# Patient Record
Sex: FEMALE | Race: BLACK OR AFRICAN AMERICAN | NOT HISPANIC OR LATINO | Employment: OTHER | URBAN - METROPOLITAN AREA
[De-identification: names, ages, dates, MRNs, and addresses within clinical notes are randomized per-mention and may not be internally consistent; named-entity substitution may affect disease eponyms.]

---

## 2018-11-05 ENCOUNTER — HOSPITAL ENCOUNTER (EMERGENCY)
Facility: OTHER | Age: 58
Discharge: HOME | End: 2018-11-06
Attending: EMERGENCY MEDICINE
Payer: MEDICARE

## 2018-11-05 DIAGNOSIS — R19.7 DIARRHEA OF PRESUMED INFECTIOUS ORIGIN: Primary | ICD-10-CM

## 2018-11-05 PROCEDURE — 99285 EMERGENCY DEPT VISIT HI MDM: CPT | Mod: 25

## 2018-11-05 PROCEDURE — 96361 HYDRATE IV INFUSION ADD-ON: CPT

## 2018-11-05 PROCEDURE — 96360 HYDRATION IV INFUSION INIT: CPT

## 2018-11-06 VITALS
TEMPERATURE: 102 F | WEIGHT: 175 LBS | HEART RATE: 86 BPM | DIASTOLIC BLOOD PRESSURE: 74 MMHG | SYSTOLIC BLOOD PRESSURE: 154 MMHG | OXYGEN SATURATION: 100 % | HEIGHT: 66 IN | RESPIRATION RATE: 18 BRPM | BODY MASS INDEX: 28.12 KG/M2

## 2018-11-06 LAB
ALBUMIN SERPL BCP-MCNC: 3.1 G/DL
ALP SERPL-CCNC: 86 U/L
ALT SERPL W/O P-5'-P-CCNC: 18 U/L
ANION GAP SERPL CALC-SCNC: 10 MMOL/L
AST SERPL-CCNC: 22 U/L
BACTERIA #/AREA URNS HPF: ABNORMAL /HPF
BASOPHILS # BLD AUTO: 0.01 K/UL
BASOPHILS NFR BLD: 0.2 %
BILIRUB SERPL-MCNC: 0.5 MG/DL
BILIRUB UR QL STRIP: ABNORMAL
BUN SERPL-MCNC: 12 MG/DL
CALCIUM SERPL-MCNC: 8.8 MG/DL
CHLORIDE SERPL-SCNC: 107 MMOL/L
CLARITY UR: ABNORMAL
CO2 SERPL-SCNC: 22 MMOL/L
COLOR UR: YELLOW
CREAT SERPL-MCNC: 0.9 MG/DL
DIFFERENTIAL METHOD: ABNORMAL
EOSINOPHIL # BLD AUTO: 0.2 K/UL
EOSINOPHIL NFR BLD: 3.8 %
ERYTHROCYTE [DISTWIDTH] IN BLOOD BY AUTOMATED COUNT: 14.1 %
EST. GFR  (AFRICAN AMERICAN): >60 ML/MIN/1.73 M^2
EST. GFR  (NON AFRICAN AMERICAN): >60 ML/MIN/1.73 M^2
GLUCOSE SERPL-MCNC: 125 MG/DL
GLUCOSE UR QL STRIP: NEGATIVE
HCT VFR BLD AUTO: 33.9 %
HGB BLD-MCNC: 10.8 G/DL
HGB UR QL STRIP: ABNORMAL
KETONES UR QL STRIP: NEGATIVE
LEUKOCYTE ESTERASE UR QL STRIP: ABNORMAL
LIPASE SERPL-CCNC: <3 U/L
LYMPHOCYTES # BLD AUTO: 0.9 K/UL
LYMPHOCYTES NFR BLD: 22.2 %
MCH RBC QN AUTO: 26.3 PG
MCHC RBC AUTO-ENTMCNC: 31.9 G/DL
MCV RBC AUTO: 83 FL
MICROSCOPIC COMMENT: ABNORMAL
MONOCYTES # BLD AUTO: 0.4 K/UL
MONOCYTES NFR BLD: 9.8 %
NEUTROPHILS # BLD AUTO: 2.7 K/UL
NEUTROPHILS NFR BLD: 63.8 %
NITRITE UR QL STRIP: NEGATIVE
PH UR STRIP: 6 [PH] (ref 5–8)
PLATELET # BLD AUTO: 251 K/UL
PMV BLD AUTO: 9.5 FL
POTASSIUM SERPL-SCNC: 3.5 MMOL/L
PROT SERPL-MCNC: 7 G/DL
PROT UR QL STRIP: ABNORMAL
RBC # BLD AUTO: 4.11 M/UL
RBC #/AREA URNS HPF: 25 /HPF (ref 0–4)
SODIUM SERPL-SCNC: 139 MMOL/L
SP GR UR STRIP: 1.01 (ref 1–1.03)
SQUAMOUS #/AREA URNS HPF: 10 /HPF
URN SPEC COLLECT METH UR: ABNORMAL
UROBILINOGEN UR STRIP-ACNC: 1 EU/DL
WBC # BLD AUTO: 4.18 K/UL
WBC #/AREA URNS HPF: 35 /HPF (ref 0–5)

## 2018-11-06 PROCEDURE — 81000 URINALYSIS NONAUTO W/SCOPE: CPT

## 2018-11-06 PROCEDURE — 25500020 PHARM REV CODE 255: Performed by: EMERGENCY MEDICINE

## 2018-11-06 PROCEDURE — 85025 COMPLETE CBC W/AUTO DIFF WBC: CPT

## 2018-11-06 PROCEDURE — 25000003 PHARM REV CODE 250: Performed by: EMERGENCY MEDICINE

## 2018-11-06 PROCEDURE — 87086 URINE CULTURE/COLONY COUNT: CPT

## 2018-11-06 PROCEDURE — 80053 COMPREHEN METABOLIC PANEL: CPT

## 2018-11-06 PROCEDURE — 83690 ASSAY OF LIPASE: CPT

## 2018-11-06 RX ORDER — AMOXICILLIN AND CLAVULANATE POTASSIUM 875; 125 MG/1; MG/1
1 TABLET, FILM COATED ORAL
Status: COMPLETED | OUTPATIENT
Start: 2018-11-06 | End: 2018-11-06

## 2018-11-06 RX ORDER — AMOXICILLIN AND CLAVULANATE POTASSIUM 875; 125 MG/1; MG/1
1 TABLET, FILM COATED ORAL 2 TIMES DAILY
Qty: 14 TABLET | Refills: 0 | Status: SHIPPED | OUTPATIENT
Start: 2018-11-06 | End: 2018-11-07 | Stop reason: ALTCHOICE

## 2018-11-06 RX ORDER — METRONIDAZOLE 500 MG/1
500 TABLET ORAL
Status: COMPLETED | OUTPATIENT
Start: 2018-11-06 | End: 2018-11-06

## 2018-11-06 RX ORDER — FLUOXETINE HYDROCHLORIDE 40 MG/1
40 CAPSULE ORAL DAILY
COMMUNITY
End: 2019-05-29 | Stop reason: SDUPTHER

## 2018-11-06 RX ORDER — ZIPRASIDONE HYDROCHLORIDE 40 MG/1
20 CAPSULE ORAL 2 TIMES DAILY
COMMUNITY
End: 2018-11-07

## 2018-11-06 RX ORDER — METRONIDAZOLE 500 MG/1
500 TABLET ORAL 3 TIMES DAILY
Qty: 21 TABLET | Refills: 0 | Status: SHIPPED | OUTPATIENT
Start: 2018-11-06 | End: 2018-11-13

## 2018-11-06 RX ORDER — ACETAMINOPHEN 500 MG
1000 TABLET ORAL
Status: COMPLETED | OUTPATIENT
Start: 2018-11-06 | End: 2018-11-06

## 2018-11-06 RX ADMIN — SODIUM CHLORIDE 1000 ML: 0.9 INJECTION, SOLUTION INTRAVENOUS at 12:11

## 2018-11-06 RX ADMIN — IOHEXOL 75 ML: 350 INJECTION, SOLUTION INTRAVENOUS at 01:11

## 2018-11-06 RX ADMIN — AMOXICILLIN AND CLAVULANATE POTASSIUM 1 TABLET: 875; 125 TABLET, FILM COATED ORAL at 02:11

## 2018-11-06 RX ADMIN — ACETAMINOPHEN 1000 MG: 500 TABLET ORAL at 12:11

## 2018-11-06 RX ADMIN — METRONIDAZOLE 500 MG: 500 TABLET ORAL at 02:11

## 2018-11-06 NOTE — ED TRIAGE NOTES
"Pt family at bedside reports pt w/ diarrhea "off and on" for a week. Family also reports pt w/ frequent urination. Pt does not have any complaints, history of MR, family reports behavior is baseline.   "

## 2018-11-06 NOTE — ED PROVIDER NOTES
"Encounter Date: 11/5/2018    SCRIBE #1 NOTE: I, Jayy Álvarez, am scribing for, and in the presence of, Dr. Umana.       History     Chief Complaint   Patient presents with    Diarrhea     Diarrhea, and abd pain Xs 2 days.      Seen by provider: 12:03 AM    Patient is a 57 y.o. female with a history of MR who presents to the ED with complaint of diarrhea, which began three days ago. Per family, patient has had intermittent diarrhea for three days as well as bowel incontinence. Family reports returning home to find the patient had soiled herself and was sitting in her feces. They also report the patient had soiled herself at the adult day care she attends. Per family, the patient has not indicated any pain, though she has been able to do so in the past. Patient denies any abdominal pain currently. Family report associated fever. They deny appetite change, vomiting, or blood in stool. They denies recent antibiotics or medication change. They note that the patient has recently been very depressed after the sudden loss of her daughter. Patient lives at home with her two siblings and attends adult day care.       The history is provided by the patient and a relative (brother and sister).     Review of patient's allergies indicates:  No Known Allergies  Past Medical History:   Diagnosis Date    Mental retardation     Mood disorder     depression     Past Surgical History:   Procedure Laterality Date    CARDIAC SURGERY      "hole in the heart"     Family History   Problem Relation Age of Onset    Diabetes Mother     Diabetes Father      Social History     Tobacco Use    Smoking status: Never Smoker   Substance Use Topics    Alcohol use: No    Drug use: Not on file     Review of Systems   Constitutional: Positive for fever. Negative for appetite change.   HENT: Negative for congestion.    Respiratory: Negative for shortness of breath.    Cardiovascular: Negative for chest pain.   Gastrointestinal: Positive for " diarrhea. Negative for abdominal pain and vomiting.   Genitourinary: Negative for difficulty urinating.   Musculoskeletal: Negative for back pain.   Skin: Negative for rash.   Neurological: Negative for dizziness, weakness and headaches.   Psychiatric/Behavioral: Negative for confusion.       Physical Exam     Initial Vitals [11/05/18 2326]   BP Pulse Resp Temp SpO2   (!) 179/81 102 18 (!) 102.3 °F (39.1 °C) 97 %      MAP       --         Physical Exam    Nursing note and vitals reviewed.  Constitutional: She appears well-developed and well-nourished. She is not diaphoretic. No distress.   HENT:   Head: Normocephalic and atraumatic.   Eyes: Conjunctivae and EOM are normal.   Neck: Normal range of motion. Neck supple.   Cardiovascular: Normal rate, regular rhythm and normal heart sounds. Exam reveals no gallop and no friction rub.    No murmur heard.  Pulmonary/Chest: Breath sounds normal. No respiratory distress. She has no wheezes. She has no rhonchi. She has no rales.   Abdominal: Soft. She exhibits no distension. There is no tenderness. There is no rebound and no guarding.   Musculoskeletal: Normal range of motion.   Neurological: She is alert.   Chronic facial asymmetry.    Skin: Skin is warm and dry.         ED Course   Procedures  Labs Reviewed   CBC W/ AUTO DIFFERENTIAL - Abnormal; Notable for the following components:       Result Value    Hemoglobin 10.8 (*)     Hematocrit 33.9 (*)     MCH 26.3 (*)     MCHC 31.9 (*)     Lymph # 0.9 (*)     All other components within normal limits   COMPREHENSIVE METABOLIC PANEL - Abnormal; Notable for the following components:    CO2 22 (*)     Glucose 125 (*)     Albumin 3.1 (*)     All other components within normal limits   LIPASE - Abnormal; Notable for the following components:    Lipase <3 (*)     All other components within normal limits   URINALYSIS, REFLEX TO URINE CULTURE - Abnormal; Notable for the following components:    Appearance, UA Cloudy (*)     Protein,  UA Trace (*)     Bilirubin (UA) 1+ (*)     Occult Blood UA 2+ (*)     Leukocytes, UA 3+ (*)     All other components within normal limits    Narrative:     Preferred Collection Type->Urine, Clean Catch   URINALYSIS MICROSCOPIC - Abnormal; Notable for the following components:    RBC, UA 25 (*)     WBC, UA 35 (*)     Bacteria, UA Many (*)     All other components within normal limits    Narrative:     Preferred Collection Type->Urine, Clean Catch   CULTURE, URINE          Imaging Results          CT Abdomen Pelvis With Contrast (Final result)  Result time 11/06/18 01:54:04    Final result by Mauricio Tejada MD (11/06/18 01:54:04)                 Impression:      No CT findings of acute diverticulitis.    Marked wall thickening fluid distention involving the small and large bowel loops.  The findings are consistent with enterocolitis (infectious or inflammatory).  Gastroenterology evaluation is suggested.      Electronically signed by: Mauricio Tejada MD  Date:    11/06/2018  Time:    01:54             Narrative:    EXAMINATION:  CT ABDOMEN PELVIS WITH CONTRAST    CLINICAL HISTORY:  LLQ pain, suspect diverticulitis;    TECHNIQUE:  Low dose axial images, sagittal and coronal reformations were obtained from the lung bases to the pubic symphysis following the IV administration of 75 mL of Omnipaque 350 .  Oral contrast was not given. Delayed images were obtained.  The examination is degraded secondary to motion    COMPARISON:  None.    FINDINGS:  There are no pleural effusions.  There is no evidence of a pneumothorax.  No airspace opacity is identified.  No pulmonary nodule is present.    The heart is unremarkable.  There is normal tapering of the abdominal aorta.  There are scattered calcifications along the course of the abdominal aorta and its branch vessels.  The portal veins and mesenteric vessels are within normal limits.  There is no evidence of lymphadenopathy in the abdomen or pelvis.    There is a small hiatal  hernia.  The distal portions of the stomach are unremarkable.  The small diverticulum involving the transverse portion of the duodenum.  The remaining duodenum is within normal limits.  There is fluid distention and wall thickening involving the small bowel loops.  The appendix is unremarkable.  There is also fluid distention and wall thickening involving the large bowel.  No transition point is identified.  There is no evidence of significant diverticular disease involving the large bowel.    The liver is unremarkable.  The gallbladder is within normal limits.  The biliary tree is unremarkable.  The spleen is within normal limits.  The pancreas is unremarkable.    The adrenal glands are unremarkable.  There are bilateral extrarenal pelvises.  The ureters are otherwise unremarkable.  The urinary bladder is within normal limits.  The patient appears status post hysterectomy.  The adnexal structures are unremarkable.    There is no evidence of free fluid the abdomen or pelvis.  Evaluation for free air is significantly limited given motion degradation.  There is no evidence of pneumatosis.  No portal venous air is identified.    The psoas margins are unremarkable.  The abdominal wall is within normal limits.  There are mild degenerative changes involving the osseous structures.                                 Medical Decision Making:   Clinical Tests:   Lab Tests: Ordered and Reviewed  Radiological Study: Ordered and Reviewed  ED Management:  Chronically ill-appearing patient, developmentally delayed, brought in by family who are the caregivers for persistent watery diarrhea of which the patient is incontinent.  No recent antibiotics.  No recent out of country travel.  Patient is not complaining of any pain. She is nontender examination. Due to the communication barrier, I do opt for a larger workup that might otherwise be indicated by and nontender diarrhea, blood work without any concerning findings.  CT scan  demonstrates likely enteritis.  Given her fever, chronic illness, and participation an adult  with other chronically ill patients, I have started her on antibiotics.  I have requested they follow up with primary care within 3 days for recheck, return here if worse.    I did have an extensive talk regarding signs to return for and need for follow up. Patient expressed understanding and will monitor symptoms closely and follow-up as needed.    KARINA Umana M.D.  11/06/2018  3:25 AM              Scribe Attestation:   Scribe #1: I performed the above scribed service and the documentation accurately describes the services I performed. I attest to the accuracy of the note.    Attending Attestation:           Physician Attestation for Scribe:  Physician Attestation Statement for Scribe #1: I, Dr. Umana, reviewed documentation, as scribed by Jayy Álvarez in my presence, and it is both accurate and complete.                    Clinical Impression:     1. Diarrhea of presumed infectious origin                                  Bryson Umana MD  11/06/18 0325

## 2018-11-07 ENCOUNTER — OFFICE VISIT (OUTPATIENT)
Dept: INTERNAL MEDICINE | Facility: CLINIC | Age: 58
End: 2018-11-07
Payer: MEDICARE

## 2018-11-07 VITALS
WEIGHT: 165.56 LBS | HEIGHT: 66 IN | BODY MASS INDEX: 26.61 KG/M2 | OXYGEN SATURATION: 98 % | HEART RATE: 78 BPM | DIASTOLIC BLOOD PRESSURE: 70 MMHG | SYSTOLIC BLOOD PRESSURE: 130 MMHG

## 2018-11-07 DIAGNOSIS — Z12.11 COLON CANCER SCREENING: ICD-10-CM

## 2018-11-07 DIAGNOSIS — Z12.4 CERVICAL CANCER SCREENING: ICD-10-CM

## 2018-11-07 DIAGNOSIS — K52.9 ENTEROCOLITIS: Primary | ICD-10-CM

## 2018-11-07 DIAGNOSIS — F32.A DEPRESSION, UNSPECIFIED DEPRESSION TYPE: ICD-10-CM

## 2018-11-07 DIAGNOSIS — Z00.00 HEALTHCARE MAINTENANCE: ICD-10-CM

## 2018-11-07 DIAGNOSIS — Z12.39 BREAST CANCER SCREENING: ICD-10-CM

## 2018-11-07 DIAGNOSIS — D64.9 NORMOCYTIC ANEMIA: ICD-10-CM

## 2018-11-07 LAB
BACTERIA UR CULT: NORMAL
BACTERIA UR CULT: NORMAL

## 2018-11-07 PROCEDURE — 99999 PR PBB SHADOW E&M-EST. PATIENT-LVL IV: CPT | Mod: PBBFAC,,, | Performed by: INTERNAL MEDICINE

## 2018-11-07 PROCEDURE — 99205 OFFICE O/P NEW HI 60 MIN: CPT | Mod: S$PBB,,, | Performed by: INTERNAL MEDICINE

## 2018-11-07 PROCEDURE — 99214 OFFICE O/P EST MOD 30 MIN: CPT | Mod: PBBFAC | Performed by: INTERNAL MEDICINE

## 2018-11-07 RX ORDER — AMOXICILLIN 500 MG/1
CAPSULE ORAL
Refills: 0 | COMMUNITY
Start: 2018-08-21 | End: 2018-11-07 | Stop reason: ALTCHOICE

## 2018-11-07 RX ORDER — CIPROFLOXACIN 500 MG/1
500 TABLET ORAL EVERY 12 HOURS
Qty: 14 TABLET | Refills: 0 | Status: SHIPPED | OUTPATIENT
Start: 2018-11-07 | End: 2018-11-14

## 2018-11-07 RX ORDER — IBUPROFEN 800 MG/1
TABLET ORAL
Refills: 0 | COMMUNITY
Start: 2018-08-21 | End: 2018-11-07

## 2018-11-07 RX ORDER — HYDROCODONE BITARTRATE AND ACETAMINOPHEN 5; 325 MG/1; MG/1
TABLET ORAL
Refills: 0 | COMMUNITY
Start: 2018-08-21 | End: 2018-11-07

## 2018-11-07 NOTE — PROGRESS NOTES
Subjective:       Patient ID: Binta Yang is a 57 y.o. female who  has a past medical history of Depression, Mental retardation, and Normocytic anemia.    Chief Complaint: Diarrhea    HPI    History was obtained from the patient and supplemented through chart review and from her sister in law who accompanied the patient.    The patient has not seen a PCP in our system.    At baseline, she has a developmental delay/mild MR.  The patient's brother is her guardian.  She attends an adult  and lives with her brother and sister in law.  Her family prepares her meals.  She can bathe herself if her family members prepare the water.  She has chronic facial asymmetry and her sister-in-law states her mouth is usually twisted towards the right.  She is unsure if the patient is mimicking other patients at the adult .  The patient is usually quiet and answers in one-word responses.      Was previously seen by Neurology on 12/11/2014 for evaluation for seizure after she passed out in [place].  EEG was normal.    She presents today due to diarrhea for about 4 days.  She has loose and watery stool.  Her sister-in-law thinks that she might have 4-5 bowel movements a day, which has not improved.  She did go to a [employer] party 3 days ago and ate chicken, fish, baked mac and cheese.  No one else was ill.  There were 2 episodes of hematochezia yesterday and today.  She denies lightheadedness, dizziness, shortness of breath, chest pain.  The patient denies abdominal pain or distension.  She has not gone to the adult  this week due to the diarrhea; she was having accidents at the adult  (unclear if this was fecal incontinence or urgency).  Her sister-in-law also notes that the patient has been sleeping all day and she has to wake her up to eat, which is unusual.  Denied fever at home although did have a temperature of 102° in the ED.  Denied nausea and vomiting.  She has had a good appetite and is eating  "well, although is not staying hydrated.  Denies sick contacts or recent travel.  She denies dysuria or hematuria.  She has not been on antibiotics recently.  Denies generalized weakness.    She went to the ED on 11/05/2018 for diarrhea.  Abdomen was nontender on exam.  She had a fever up to 102.3 in the ED.  Labs did not reveal leukocytosis, transaminitis, elevated T bili or alk-phos.  Lipase negative.  UA with cloudy appearance, negative for nitrites.  3+ leuk esterase, 25 red blood cells, 35 white blood cells, and many bacteria.  There were 10 squamous cells.  Urine culture with multiple organisms, final.  CT abdomen pelvis without diverticulitis, but with wall thickening consistent with enterocolitis.  Was discharged with Augmentin b.i.d. for 7 days and Flagyl t.i.d. for 7 days.  She has been compliant with her medications but without improvement.    Elevated BP:  BP was 179/81, heart rate 102 in the ED.  Otherwise BP has been in the 110s to 130s over 70s.  BP today 130/70.    Depression:  Her psychiatrist is Dr. Fam Urias.  Her daughter recently passed away 2 years ago who had Down syndrome and MR.  She is taking wellbutrin 100 and Prozac 40.      Review of Systems   Unable to perform ROS: Patient nonverbal   Constitutional: Positive for activity change. Negative for appetite change and fever.   Respiratory: Negative for shortness of breath.    Cardiovascular: Negative for chest pain.   Gastrointestinal: Positive for blood in stool and diarrhea. Negative for abdominal distention, abdominal pain, nausea and vomiting.   Genitourinary: Negative for dysuria and hematuria.   Neurological: Negative for dizziness and light-headedness.       Past Medical History:   Diagnosis Date    Depression     Mental retardation     Normocytic anemia      Past Surgical History:   Procedure Laterality Date    CARDIAC SURGERY      "hole in the heart", during childhood    HYSTERECTOMY      difficulties with personal hygiene " "    Family History   Problem Relation Age of Onset    Diabetes Mother     Asthma Mother     Hypertension Mother     Diabetes Father     Hypertension Father     Prostate cancer Sister     Prostate cancer Brother     Diverticulitis Brother     Heart disease Daughter 35    Mental retardation Daughter     Congenital heart disease Daughter     Down syndrome Daughter     Mental retardation Son         mild, but functional     Social History     Socioeconomic History    Marital status: Single     Spouse name: Not on file    Number of children: Not on file    Years of education: Not on file    Highest education level: Not on file   Social Needs    Financial resource strain: Not on file    Food insecurity - worry: Not on file    Food insecurity - inability: Not on file    Transportation needs - medical: Not on file    Transportation needs - non-medical: Not on file   Occupational History    Not on file   Tobacco Use    Smoking status: Never Smoker    Smokeless tobacco: Never Used   Substance and Sexual Activity    Alcohol use: No    Drug use: Not on file    Sexual activity: Not on file   Other Topics Concern    Not on file   Social History Narrative    Not on file     Objective:      Vitals:    11/07/18 1417   BP: 130/70   BP Location: Left arm   Patient Position: Sitting   BP Method: Medium (Manual)   Pulse: 78   SpO2: 98%   Weight: 75.1 kg (165 lb 9.1 oz)   Height: 5' 6" (1.676 m)      Physical Exam   Constitutional: She appears well-developed and well-nourished. No distress.   HENT:   Head: Normocephalic and atraumatic.   Nose: Nose normal.   Mouth/Throat: Oropharynx is clear and moist. No oropharyngeal exudate.   Eyes: Conjunctivae and EOM are normal. Pupils are equal, round, and reactive to light. Right eye exhibits no discharge. Left eye exhibits no discharge. No scleral icterus.   Neck: Neck supple. No tracheal deviation present. No thyromegaly present.   Cardiovascular: Normal rate, " regular rhythm, normal heart sounds and intact distal pulses.   No murmur heard.  Pulmonary/Chest: Effort normal and breath sounds normal. No respiratory distress. She has no wheezes.   Abdominal: Soft. Bowel sounds are normal. She exhibits no distension. There is no tenderness. There is no guarding.   Musculoskeletal: She exhibits no edema or deformity.   Lymphadenopathy:     She has no cervical adenopathy.   Neurological: She is alert. No cranial nerve deficit. Gait normal.   Paucity of speech.  Answers in one-word responses.  Mouth is twisted towards the right.  Follows commands.   Skin: Skin is warm and dry. Capillary refill takes less than 2 seconds. She is not diaphoretic. No erythema.   Psychiatric: She has a normal mood and affect. Her behavior is normal.         Lab Results   Component Value Date    WBC 4.18 11/06/2018    HGB 10.8 (L) 11/06/2018    HCT 33.9 (L) 11/06/2018     11/06/2018    ALT 18 11/06/2018    AST 22 11/06/2018     11/06/2018    K 3.5 11/06/2018     11/06/2018    CREATININE 0.9 11/06/2018    BUN 12 11/06/2018    CO2 22 (L) 11/06/2018       The ASCVD Risk score ([place] DC Jr., et al., 2013) failed to calculate for the following reasons:    Cannot find a previous HDL lab    Cannot find a previous total cholesterol lab    (Imaging have been independently reviewed)  CT abdomen pelvis without diverticulitis, but with wall thickening consistent with enterocolitis.     Assessment:       1. Depression, unspecified depression type    2. Normocytic anemia    3. Enterocolitis    4. Cervical cancer screening    5. Colon cancer screening    6. Breast cancer screening    7. Healthcare maintenance          Plan:       Binta was seen today for diarrhea.    Diagnoses and all orders for this visit:    Depression, unspecified depression type  Comments:  psychiatrist is Dr. Fam Urias. Cont.  Wellbutrin 100 and Prozac 40    Normocytic anemia  Comments:  Will order iron studies during  well visit    Enterocolitis  Comments:  Discontinue Augmentin. Continue Flagyl. Add Cipro for better anaerobic coverage. Stool studies since she is at an adult  with fever  Orders:  -     WBC, Stool; Future  -     Stool culture; Future  -     Clostridium difficile EIA; Future  -     Occult blood x 1, stool; Future  -     ciprofloxacin HCl (CIPRO) 500 MG tablet; Take 1 tablet (500 mg total) by mouth every 12 (twelve) hours. for 7 days    Cervical cancer screening  Comments:  Will order referral to OBGYN during future well visit for annual pelvic exam.  History of hysterectomy for hygienic reasons and self-care    Colon cancer screening  Comments:  Will order referral to GI during future well visit after enterocolitis has resolved    Breast cancer screening  Comments:  Will order mammogram doing well visit    Healthcare maintenance  Comments:  Will assess vaccination status during future well visit    Other orders  -     Cancel: Ferritin; Future  -     Cancel: Iron and TIBC; Future  -     Cancel: Vitamin B12; Future  -     Cancel: Hemoglobin A1c; Future  -     Cancel: Lipid panel; Future  -     Cancel: Ambulatory referral to Gastroenterology  -     Cancel: Mammo Digital Screening Bilateral With CAD; Future  -     Cancel: Ambulatory Referral to Obstetrics / Gynecology  -     Cancel: Ambulatory Referral to Psychiatry  -     Cancel: TSH; Future         Notification of Lab Results: Phone Call .  Astrid Yang, brother.  [phone]    Side effects of medication(s) were discussed in detail and patient voiced understanding.  Patient will call back for any issues or complications.     RTC in 1 month(s) or sooner PRN for f/u gastroenteritis and well visit.

## 2018-11-12 ENCOUNTER — LAB VISIT (OUTPATIENT)
Dept: LAB | Facility: OTHER | Age: 58
End: 2018-11-12
Payer: MEDICARE

## 2018-11-12 DIAGNOSIS — K52.9 ENTEROCOLITIS: ICD-10-CM

## 2018-11-12 LAB
C DIFF GDH STL QL: NEGATIVE
C DIFF TOX A+B STL QL IA: NEGATIVE
OB PNL STL: POSITIVE
WBC #/AREA STL HPF: NORMAL /[HPF]

## 2018-11-12 PROCEDURE — 89055 LEUKOCYTE ASSESSMENT FECAL: CPT

## 2018-11-12 PROCEDURE — 87045 FECES CULTURE AEROBIC BACT: CPT

## 2018-11-12 PROCEDURE — 82272 OCCULT BLD FECES 1-3 TESTS: CPT

## 2018-11-12 PROCEDURE — 87046 STOOL CULTR AEROBIC BACT EA: CPT

## 2018-11-12 PROCEDURE — 87427 SHIGA-LIKE TOXIN AG IA: CPT

## 2018-11-12 PROCEDURE — 87324 CLOSTRIDIUM AG IA: CPT

## 2018-11-14 ENCOUNTER — TELEPHONE (OUTPATIENT)
Dept: INTERNAL MEDICINE | Facility: CLINIC | Age: 58
End: 2018-11-14

## 2018-11-15 LAB
BACTERIA STL CULT: NORMAL
BACTERIA STL CULT: NORMAL

## 2018-11-16 ENCOUNTER — TELEPHONE (OUTPATIENT)
Dept: INTERNAL MEDICINE | Facility: CLINIC | Age: 58
End: 2018-11-16

## 2018-11-16 NOTE — LETTER
November 16, 2018    Binta BRUCE [id number]  [facility] [address]             [facility]  [address]  [facility] [address]  Phone: [phone]  Fax: [phone] Dear MsMoy Trey:    This letter is for the patient's family to let them know that her stool studies were negative for infection, but she should complete the antibiotics as prescribed.  We will re-evaluate her symptoms during her follow-up visit and then refer to GI.       If you have any questions or concerns, please don't hesitate to call.    Sincerely,        Genesis Marquez MA

## 2018-12-06 NOTE — PROGRESS NOTES
Subjective:       Patient ID: Binta Yang is a 57 y.o. female who  has a past medical history of Depression, Mental retardation, and Normocytic anemia.    Chief Complaint: Follow-up (Enterocolitis); Establish Care; and Annual Exam    HPI    History was obtained from the patient and supplemented through chart review and from sister in law who accompanied the patient.  No ER or other clinic visits since our last appointment.    She saw me on 11/07/2018 for diarrhea and presents for follow-up for enterocolitis.    At baseline, she has a developmental delay/mild MR.  The patient's brother is her guardian.  She attends an adult  and lives with her brother and sister in law.  Her family prepares her meals.  She can bathe herself if her family members prepare the water.  She requires frequent reminders to shower and brush her teeth.  She can walk and transfer herself without difficulty. She has chronic facial asymmetry and her sister-in-law states her mouth is usually twisted towards the right.  She is unsure if the patient is mimicking other patients at the adult .  The patient is usually quiet.  She can nod her head and answer in one-word responses; has appropriate responses.        Diarrhea/enterocolitis:  Established care with me last month due to loose and watery stool with 4-5 bowel movements a day, which has not improved.  There were 2 episodes of hematochezia.  She denied lightheadedness, dizziness, shortness of breath, chest pain.  The patient denies abdominal pain or distension.  Goes to adult  with fecal or urine incontinence versus urgency.  Denied fever, nausea and vomiting.  She has had a good appetite and is eating well.  She denies dysuria or hematuria.  Denies generalized weakness.     She went to the ED on 11/05/2018 for diarrhea.  Abdomen was nontender on exam.  She had a fever up to 102.3 in the ED.  Labs did not reveal leukocytosis, transaminitis, elevated T bili or alk-phos.   Lipase negative.  UA with cloudy appearance, negative for nitrites.  3+ leuk esterase, 25 red blood cells, 35 white blood cells, and many bacteria.  There were 10 squamous cells.  Urine culture with multiple organisms, final.  CT abdomen pelvis without diverticulitis, but with wall thickening consistent with enterocolitis.  Was discharged with Augmentin b.i.d. for 7 days and Flagyl t.i.d. for 7 days.  She was compliant with her medications but without improvement.    On my exam, she appeared in no apparent distress and was nontoxic appearing.  Abdomen was nontender to palpation.  During our last visit, I stopped Augmentin. Flagyl was continued and I added Cipro x 7 days for better enteric anaerobic coverage. C diff and Stool studies were negative    Her diarrhea has completely resolved since then.  Denies abdominal pain, nausea or vomiting.  Reports good appetite.  Denies fever.     Elevated BP:  BP was 179/81, heart rate 102 in the ED last month.  Otherwise BP has been in the 110s to 130s over 70s.  BP today 180/90, repeat was 160/90.  Her sister-in-law has checked her BP at home and it is 120-130 systolic.  She has not checked her blood pressure in about a month.  Denies CP, SOB, LIZAMA, lightheadedness, dizziness.    Functional fecal and urinary incontinence:  Needs to change her clothes about 3 to 4 times a week.  Requires a lot of encouragement to do ADLs.  Her sister-in-law thinks that she waits until the last minute and therefore can't get to the restroom in time.  The patient denies dysuria, hematuria, cloudy appearing urine.  Denies stress incontinence.     Depression:  Her psychiatrist is Dr. Fam Urias.  Her daughter recently passed away 2 years ago who had Down syndrome and MR.  She is taking wellbutrin 100 and Prozac 40.  Reports good mood.  The patient requires a lot of encouragement to do ADLs.    Review of Systems   Constitutional: Negative for fever and unexpected weight change.   HENT: Negative  "for rhinorrhea and sneezing.    Eyes: Negative for redness and itching.   Respiratory: Negative for shortness of breath and wheezing.    Cardiovascular: Negative for chest pain and palpitations.   Gastrointestinal: Negative for abdominal pain and diarrhea.   Genitourinary: Negative for dysuria and hematuria.   Musculoskeletal: Negative for gait problem and joint swelling.   Skin: Negative for color change and rash.   Neurological: Negative for weakness and headaches.   Hematological: Negative for adenopathy.   Psychiatric/Behavioral: Negative for dysphoric mood and self-injury.         I personally reviewed Past Medical History, Past Surgical History, Social History, and Family History.    Objective:      Vitals:    12/07/18 1103 12/07/18 1136   BP: (!) 180/90 (!) 160/90   Pulse: 86    SpO2: 98%    Weight: 76.4 kg (168 lb 6.9 oz)    Height: 5' 6" (1.676 m)       Physical Exam   Constitutional: She appears well-developed and well-nourished. No distress.   HENT:   Head: Normocephalic and atraumatic.   Nose: Nose normal.   Mouth/Throat: Oropharynx is clear and moist. No oropharyngeal exudate.   Eyes: Conjunctivae and EOM are normal. Pupils are equal, round, and reactive to light. Right eye exhibits no discharge. Left eye exhibits no discharge. No scleral icterus.   Neck: Neck supple. No tracheal deviation present. No thyromegaly present.   Cardiovascular: Normal rate, regular rhythm, normal heart sounds and intact distal pulses.   No murmur heard.  Pulmonary/Chest: Effort normal and breath sounds normal. No respiratory distress. She has no wheezes.   Abdominal: Soft. Bowel sounds are normal. She exhibits no distension. There is no tenderness.   Musculoskeletal: She exhibits edema. She exhibits no deformity.   Trace edema to bilateral legs   Lymphadenopathy:     She has no cervical adenopathy.   Neurological: She is alert. No cranial nerve deficit.   Not oriented to day of week, year  Oriented to location, season, " holiday  Mouth twisted to the R, chronic  Follows commands   Skin: Skin is warm and dry. Capillary refill takes less than 2 seconds. She is not diaphoretic. No erythema.   Psychiatric: She has a normal mood and affect. Her behavior is normal.   Paucity of speech.  A few word answers. Nods appropriately         Lab Results   Component Value Date    WBC 4.18 11/06/2018    HGB 10.8 (L) 11/06/2018    HCT 33.9 (L) 11/06/2018     11/06/2018    ALT 18 11/06/2018    AST 22 11/06/2018     11/06/2018    K 3.5 11/06/2018     11/06/2018    CREATININE 0.9 11/06/2018    BUN 12 11/06/2018    CO2 22 (L) 11/06/2018       The ASCVD Risk score (Masonariel HASKINS Jr., et al., 2013) failed to calculate for the following reasons:    Cannot find a previous HDL lab    Cannot find a previous total cholesterol lab    (Imaging have been independently reviewed)  CT abdomen with enterocolitis    Assessment:       1. Annual physical exam    2. Need for hepatitis C screening test    3. Screening for colorectal cancer    4. Visit for screening mammogram    5. Mental retardation    6. Mild episode of recurrent major depressive disorder    7. Enterocolitis    8. Normocytic anemia    9. Well woman exam with routine gynecological exam    10. Encounter for lipid screening for cardiovascular disease    11. Encounter for screening for diabetes mellitus    12. Functional incontinence    13. Anemia associated with nutritional deficiency     14. Abnormal finding of blood chemistry     15. Elevated blood pressure reading          Plan:       Binta was seen today for follow-up, establish care and annual exam.    Diagnoses and all orders for this visit:    Annual physical exam  Comments:  Form filled out for adult     Need for hepatitis C screening test  -     Hepatitis C antibody; Future    Screening for colorectal cancer  Comments:  Family prefers colonoscopy rather than fit kit since it is hard to monitor her bowel habits  Orders:  -      Ambulatory referral to Gastroenterology; Future    Visit for screening mammogram  -     Mammo Digital Screening Bilat w/ Arnold; Future    Mental retardation  Comments:  Needs encouragement and assistance to perform ADLs. Goes to adult . Form completed and given to the patient    Mild episode of recurrent major depressive disorder  Comments:  Reports good mood.  Continue Wellbutrin 100 and Prozac 40.  Psychiatrist is Dr. Fam Urias    Enterocolitis  Comments:  Symptoms completely resolved after Flagyl and Cipro. Stool studies negative    Normocytic anemia  -     Ferritin; Future  -     Iron and TIBC; Future  -     Vitamin B12; Future  -     Folate; Future    Well woman exam with routine gynecological exam  Comments:  Annual pelvic exam and might not be able to report symptoms as easily. h/o hysterectomy for hygienic reasons and self-care  Orders:  -     Ambulatory Referral to Obstetrics / Gynecology; Future    Encounter for lipid screening for cardiovascular disease  -     Lipid panel; Future    Encounter for screening for diabetes mellitus  -     Hemoglobin A1c; Future    Functional incontinence  Comments:  Fecal and urine.  Previous UA with asymptomatic bacteriuria; no need for antibiotics.  No urinary symptoms.  Discussed timed voiding    Anemia associated with nutritional deficiency   -     Vitamin B12; Future  -     Folate; Future    Abnormal finding of blood chemistry   -     Hemoglobin A1c; Future    Elevated blood pressure reading  Comments:  BP 110s to 130s, isolated to 180.  Asymptomatic. Family will bring BP log. Nurse visit for BP check and 1-2 weeks.  Return to clinic in 1 month.    Other orders  -     Cancel: Fecal Immunochemical Test (iFOBT); Future         Notification of Lab Results: Phone Call  Astrid Yang, brother.  [phone]    Side effects of medication(s) were discussed in detail and patient voiced understanding.  Patient will call back for any issues or complications.     RTC in  1 month(s) or sooner PRN for elevated blood pressure with BP log.  Nurse visit in 1-2 weeks for BP check.

## 2018-12-07 ENCOUNTER — OFFICE VISIT (OUTPATIENT)
Dept: INTERNAL MEDICINE | Facility: CLINIC | Age: 58
End: 2018-12-07
Payer: MEDICARE

## 2018-12-07 VITALS
WEIGHT: 168.44 LBS | DIASTOLIC BLOOD PRESSURE: 90 MMHG | SYSTOLIC BLOOD PRESSURE: 160 MMHG | OXYGEN SATURATION: 98 % | BODY MASS INDEX: 27.07 KG/M2 | HEIGHT: 66 IN | HEART RATE: 86 BPM

## 2018-12-07 DIAGNOSIS — R79.9 ABNORMAL FINDING OF BLOOD CHEMISTRY: ICD-10-CM

## 2018-12-07 DIAGNOSIS — D53.9 ANEMIA ASSOCIATED WITH NUTRITIONAL DEFICIENCY: ICD-10-CM

## 2018-12-07 DIAGNOSIS — Z13.220 ENCOUNTER FOR LIPID SCREENING FOR CARDIOVASCULAR DISEASE: ICD-10-CM

## 2018-12-07 DIAGNOSIS — Z01.419 WELL WOMAN EXAM WITH ROUTINE GYNECOLOGICAL EXAM: ICD-10-CM

## 2018-12-07 DIAGNOSIS — K52.9 ENTEROCOLITIS: ICD-10-CM

## 2018-12-07 DIAGNOSIS — Z13.6 ENCOUNTER FOR LIPID SCREENING FOR CARDIOVASCULAR DISEASE: ICD-10-CM

## 2018-12-07 DIAGNOSIS — D64.9 NORMOCYTIC ANEMIA: ICD-10-CM

## 2018-12-07 DIAGNOSIS — F33.0 MILD EPISODE OF RECURRENT MAJOR DEPRESSIVE DISORDER: ICD-10-CM

## 2018-12-07 DIAGNOSIS — Z12.12 SCREENING FOR COLORECTAL CANCER: ICD-10-CM

## 2018-12-07 DIAGNOSIS — Z12.31 VISIT FOR SCREENING MAMMOGRAM: ICD-10-CM

## 2018-12-07 DIAGNOSIS — Z11.59 NEED FOR HEPATITIS C SCREENING TEST: ICD-10-CM

## 2018-12-07 DIAGNOSIS — F79 MENTAL RETARDATION: ICD-10-CM

## 2018-12-07 DIAGNOSIS — R39.81 FUNCTIONAL INCONTINENCE: ICD-10-CM

## 2018-12-07 DIAGNOSIS — Z13.1 ENCOUNTER FOR SCREENING FOR DIABETES MELLITUS: ICD-10-CM

## 2018-12-07 DIAGNOSIS — Z12.11 SCREENING FOR COLORECTAL CANCER: ICD-10-CM

## 2018-12-07 DIAGNOSIS — Z00.00 ANNUAL PHYSICAL EXAM: Primary | ICD-10-CM

## 2018-12-07 DIAGNOSIS — R03.0 ELEVATED BLOOD PRESSURE READING: ICD-10-CM

## 2018-12-07 PROCEDURE — 99396 PREV VISIT EST AGE 40-64: CPT | Mod: S$PBB,,, | Performed by: INTERNAL MEDICINE

## 2018-12-07 PROCEDURE — 99215 OFFICE O/P EST HI 40 MIN: CPT | Mod: PBBFAC | Performed by: INTERNAL MEDICINE

## 2018-12-07 PROCEDURE — 99999 PR PBB SHADOW E&M-EST. PATIENT-LVL V: CPT | Mod: PBBFAC,,, | Performed by: INTERNAL MEDICINE

## 2018-12-10 ENCOUNTER — HOSPITAL ENCOUNTER (OUTPATIENT)
Dept: RADIOLOGY | Facility: OTHER | Age: 58
Discharge: HOME | End: 2018-12-10
Attending: INTERNAL MEDICINE
Payer: MEDICARE

## 2018-12-10 DIAGNOSIS — Z12.31 VISIT FOR SCREENING MAMMOGRAM: ICD-10-CM

## 2018-12-10 PROCEDURE — 77063 BREAST TOMOSYNTHESIS BI: CPT | Mod: 26,,, | Performed by: RADIOLOGY

## 2018-12-10 PROCEDURE — 77067 SCR MAMMO BI INCL CAD: CPT | Mod: 26,,, | Performed by: RADIOLOGY

## 2018-12-10 PROCEDURE — 77063 BREAST TOMOSYNTHESIS BI: CPT | Mod: TC

## 2018-12-11 ENCOUNTER — TELEPHONE (OUTPATIENT)
Dept: INTERNAL MEDICINE | Facility: CLINIC | Age: 58
End: 2018-12-11

## 2018-12-11 NOTE — TELEPHONE ENCOUNTER
Called and spoke with Patient sister in law whom brings her to the doctor and told her mammogram was normal we will repeat the study in one year

## 2018-12-13 ENCOUNTER — OFFICE VISIT (OUTPATIENT)
Dept: OBSTETRICS AND GYNECOLOGY | Facility: CLINIC | Age: 58
End: 2018-12-13
Payer: MEDICARE

## 2018-12-13 VITALS
BODY MASS INDEX: 27.39 KG/M2 | HEIGHT: 66 IN | WEIGHT: 170.44 LBS | DIASTOLIC BLOOD PRESSURE: 94 MMHG | SYSTOLIC BLOOD PRESSURE: 148 MMHG

## 2018-12-13 DIAGNOSIS — Z01.419 WELL WOMAN EXAM WITH ROUTINE GYNECOLOGICAL EXAM: ICD-10-CM

## 2018-12-13 PROCEDURE — 99213 OFFICE O/P EST LOW 20 MIN: CPT | Mod: PBBFAC

## 2018-12-13 PROCEDURE — 99999 PR PBB SHADOW E&M-EST. PATIENT-LVL III: CPT | Mod: PBBFAC,,,

## 2018-12-13 PROCEDURE — G0101 CA SCREEN;PELVIC/BREAST EXAM: HCPCS | Mod: S$PBB,,, | Performed by: OBSTETRICS & GYNECOLOGY

## 2018-12-14 NOTE — PROGRESS NOTES
CC: Annual  HPI: Pt is a 57 y.o.  female who presents for routine annual exam. Pt has MR and her brother is her guardian. Here today with her sister in law. Recently had routine exam with her PCP. Hx of a hysterectomy 20+ years ago at [facility]. Unsure if her ovaries are intact or not. No problems today. She is not SA. Recently had mammogram done, negative results. Denies family hx of breast or ovarian cancer.     ROS:  GENERAL: Feeling well overall. Denies fever or chills.   SKIN: Denies rash or lesions.   HEAD: Denies head injury or headache.   NODES: Denies enlarged lymph nodes.   CHEST: Denies chest pain or shortness of breath.   CARDIOVASCULAR: Denies palpitations or left sided chest pain.   ABDOMEN: No abdominal pain, constipation, diarrhea, nausea, vomiting or rectal bleeding.   URINARY: No dysuria, hematuria, or burning on urination.  REPRODUCTIVE: See HPI.   BREASTS: Denies pain, lumps, or nipple discharge.   HEMATOLOGIC: No easy bruisability or excessive bleeding.   MUSCULOSKELETAL: Denies joint pain or swelling.   NEUROLOGIC: Denies syncope or weakness.   PSYCHIATRIC: Denies depression, anxiety or mood swings.    PE:   APPEARANCE: Well nourished, well developed, Black or  female in no acute distress.  NODES: no cervical, supraclavicular, or inguinal lymphadenopathy  BREASTS: Symmetrical, no skin changes or visible lesions. No palpable masses, nipple discharge or adenopathy bilaterally.  ABDOMEN: Soft. No tenderness or masses. No distention. No hernias palpated. No CVA tenderness.  VULVA: No lesions. Normal external female genitalia.  URETHRAL MEATUS: Normal size and location, no lesions, no prolapse.  URETHRA: No masses, tenderness, or prolapse.  Pt unable to tolerate speculum exam.  ANUS PERINEUM: Normal.      Diagnosis:  1. Well woman exam with routine gynecological exam        Plan:   1. MMG current, due in Dec 2019  2. Pap not indicated       Patient was counseled today on the new ACS  guidelines for cervical cytology screening as well as the current recommendations for breast cancer screening. She was counseled to follow up with her PCP for other routine health maintenance. Counseling session lasted approximately 10 minutes, and all her questions were answered.    Follow-up with me in 2 years for routine exam 2/2 Medicare  MMG in 1 year

## 2018-12-21 ENCOUNTER — TELEPHONE (OUTPATIENT)
Dept: INTERNAL MEDICINE | Facility: CLINIC | Age: 58
End: 2018-12-21

## 2018-12-21 ENCOUNTER — LAB VISIT (OUTPATIENT)
Dept: LAB | Facility: OTHER | Age: 58
End: 2018-12-21
Payer: MEDICARE

## 2018-12-21 DIAGNOSIS — Z11.59 NEED FOR HEPATITIS C SCREENING TEST: ICD-10-CM

## 2018-12-21 DIAGNOSIS — Z13.220 ENCOUNTER FOR LIPID SCREENING FOR CARDIOVASCULAR DISEASE: ICD-10-CM

## 2018-12-21 DIAGNOSIS — Z13.6 ENCOUNTER FOR LIPID SCREENING FOR CARDIOVASCULAR DISEASE: ICD-10-CM

## 2018-12-21 DIAGNOSIS — E53.8 CYANOCOBALAMIN DEFICIENCY: ICD-10-CM

## 2018-12-21 DIAGNOSIS — R79.9 ABNORMAL FINDING OF BLOOD CHEMISTRY: ICD-10-CM

## 2018-12-21 DIAGNOSIS — D53.9 ANEMIA ASSOCIATED WITH NUTRITIONAL DEFICIENCY: ICD-10-CM

## 2018-12-21 DIAGNOSIS — Z13.1 ENCOUNTER FOR SCREENING FOR DIABETES MELLITUS: ICD-10-CM

## 2018-12-21 DIAGNOSIS — D64.9 NORMOCYTIC ANEMIA: ICD-10-CM

## 2018-12-21 LAB
CHOLEST SERPL-MCNC: 220 MG/DL
CHOLEST/HDLC SERPL: 3.1 {RATIO}
ESTIMATED AVG GLUCOSE: 120 MG/DL
FERRITIN SERPL-MCNC: 56 NG/ML
FOLATE SERPL-MCNC: 8.7 NG/ML
HBA1C MFR BLD HPLC: 5.8 %
HCV AB SERPL QL IA: NEGATIVE
HDLC SERPL-MCNC: 71 MG/DL
HDLC SERPL: 32.3 %
IRON SERPL-MCNC: 95 UG/DL
LDLC SERPL CALC-MCNC: 134.8 MG/DL
NONHDLC SERPL-MCNC: 149 MG/DL
SATURATED IRON: 31 %
TOTAL IRON BINDING CAPACITY: 308 UG/DL
TRANSFERRIN SERPL-MCNC: 208 MG/DL
TRIGL SERPL-MCNC: 71 MG/DL
VIT B12 SERPL-MCNC: 189 PG/ML

## 2018-12-21 PROCEDURE — 80061 LIPID PANEL: CPT

## 2018-12-21 PROCEDURE — 83540 ASSAY OF IRON: CPT

## 2018-12-21 PROCEDURE — 82728 ASSAY OF FERRITIN: CPT

## 2018-12-21 PROCEDURE — 82607 VITAMIN B-12: CPT

## 2018-12-21 PROCEDURE — 83036 HEMOGLOBIN GLYCOSYLATED A1C: CPT

## 2018-12-21 PROCEDURE — 36415 COLL VENOUS BLD VENIPUNCTURE: CPT

## 2018-12-21 PROCEDURE — 86803 HEPATITIS C AB TEST: CPT

## 2018-12-21 PROCEDURE — 82746 ASSAY OF FOLIC ACID SERUM: CPT

## 2018-12-21 RX ORDER — LANOLIN ALCOHOL/MO/W.PET/CERES
1000 CREAM (GRAM) TOPICAL DAILY
Qty: 90 TABLET | Refills: 1 | Status: SHIPPED | OUTPATIENT
Start: 2018-12-21 | End: 2019-01-11 | Stop reason: SDUPTHER

## 2018-12-21 NOTE — TELEPHONE ENCOUNTER
Called and spoke with  Patient sister in law or daughter in law and discussed the labs and told her to  there medications that was sent to the pharmacy for her she said ok thanks

## 2019-01-10 NOTE — PROGRESS NOTES
Subjective:       Patient ID: Binta Yang is a 58 y.o. female who  has a past medical history of Cyanocobalamin deficiency, Depression, Essential hypertension, Hyperlipidemia, Mental retardation, Normocytic anemia, and Pre-diabetes.    Chief Complaint: Follow-up (HTN)    HPI    History was obtained from the patient and supplemented through chart review and from her brother Marion and Irma (sister in law) over the phonewho accompanied the patient.    Had OBGYN pelvic exam; repeat every 2 years.    Presents follow-up for elevated blood pressure.     Elevated BP:  BP was elevated to 179/81, heart rate 102 in the ED.  Otherwise BP has been in the 110s to 130s over 70s.  However, more recent BP elevated to 180/90, repeat was 160/90.  Her sister-in-law has checked her BP at home and it is 120-130 systolic, but was not checking BP regularly.  More recent BP at home was 180/80 in the evening.  Denies CP, SOB, LIZAMA, lightheadedness, dizziness.  Reports excitement for the [place] playoff game. BP in clinic 217/90,  184/84.      Her brother and sister-in-law prepare meals.  Ate spaghetti for dinner last night.  Does drink soft drinks, ice tea, fruit juice.  Eats deli meats her lunch at the adult day care.  She prefers to eat food with soft texture, such as mashed potatoes.    MR:  At baseline, she has a developmental delay/mild MR.  The patient's brother is her guardian.  She attends an adult  and lives with her brother and sister in law.  Her family prepares her meals.  She can bathe herself if her family members prepare the water.  She requires frequent reminders to shower and brush her teeth.  She can walk and transfer herself without difficulty. She has chronic facial asymmetry and her sister-in-law states her mouth is usually twisted towards the right.  The patient is usually quiet.  She can nod her head and answer in one-word responses; has appropriate responses.         Functional fecal and urinary  incontinence:  Needs to change her clothes about 3 to 4 times a week.  Requires a lot of encouragement to do ADLs.  Her sister-in-law thinks that she waits until the last minute and therefore can't get to the restroom in time.  The patient denies dysuria, hematuria, cloudy appearing urine.  Denies stress incontinence.     Depression:  Her psychiatrist is Dr. Fam Urias, but he might not be able to see her anymore due to change in insurance.  He might be visiting them at their home.  They saw him very recently and have adequate refills.  Her daughter passed away 2 years ago who had Down syndrome and MR.  She Prozac 40.  Wellbutrin 100 was switched to Geodon 40.  Reports good mood, good appetite.  Denies nausea or vomiting.  Denies insomnia.    Hyperlipidemia:  ASCVD 7.3. Total cholesterol 220.  On labs WNL.  Not on stain.  Diet as above.  Also eats fast food and quick meals sometimes.    Pre diabetes:  Drinks soft drinks and fruit juice as above.  Lab Results   Component Value Date    HGBA1C 5.8 (H) 12/21/2018     Vitamin B12 deficiency and normocytic anemia:  B12 Low at 189.  Has not yet started 1000 mcg a day.  Up-to-date on mammogram.  Already ordered a referral for colonoscopy.  OBGYN for pelvic exam q2 years.  History of hysterectomy for hygienic reasons and self-care.    Review of Systems   Constitutional: Negative for fever and unexpected weight change.   HENT: Negative for rhinorrhea and sneezing.    Eyes: Negative for redness and itching.   Respiratory: Negative for shortness of breath and wheezing.    Cardiovascular: Negative for chest pain, palpitations and leg swelling.   Gastrointestinal: Negative for abdominal pain and diarrhea.   Genitourinary: Negative for dysuria and hematuria.   Musculoskeletal: Negative for gait problem and joint swelling.   Skin: Negative for color change and rash.   Neurological: Negative for weakness and headaches.   Hematological: Negative for adenopathy.  "  Psychiatric/Behavioral: Negative for dysphoric mood and self-injury.       I personally reviewed Past Medical History, Past Surgical History, Social History, and Family History.    Objective:      Vitals:    01/11/19 1011 01/11/19 1037   BP: (!) 217/90 (!) 184/84   Pulse: 93    SpO2: 99%    Weight: 78.5 kg (173 lb 1 oz)    Height: 5' 6" (1.676 m)       Physical Exam   Constitutional: She appears well-developed and well-nourished. No distress.   HENT:   Head: Normocephalic and atraumatic.   Nose: Nose normal.   Mouth/Throat: Oropharynx is clear and moist. No oropharyngeal exudate.   Eyes: Conjunctivae and EOM are normal. Pupils are equal, round, and reactive to light. Right eye exhibits no discharge. Left eye exhibits no discharge. No scleral icterus.   Neck: Neck supple. No tracheal deviation present. No thyromegaly present.   Cardiovascular: Normal rate, regular rhythm, normal heart sounds and intact distal pulses.   No murmur heard.  Pulmonary/Chest: Effort normal and breath sounds normal. No respiratory distress. She has no wheezes.   Abdominal: Soft. Bowel sounds are normal. She exhibits no distension. There is no tenderness.   Musculoskeletal: She exhibits no edema or deformity.   Lymphadenopathy:     She has no cervical adenopathy.   Neurological: She is alert. No cranial nerve deficit.   Mouth twisted to the R, chronic  Follows commands   Skin: Skin is warm and dry. Capillary refill takes less than 2 seconds. She is not diaphoretic. No erythema.   Psychiatric: She has a normal mood and affect. Her behavior is normal.   Paucity of speech.  A few word answers. Nods appropriately         Lab Results   Component Value Date    WBC 4.18 11/06/2018    HGB 10.8 (L) 11/06/2018    HCT 33.9 (L) 11/06/2018     11/06/2018    CHOL 220 (H) 12/21/2018    TRIG 71 12/21/2018    HDL 71 12/21/2018    ALT 18 11/06/2018    AST 22 11/06/2018     11/06/2018    K 3.5 11/06/2018     11/06/2018    CREATININE 0.9 " 11/06/2018    BUN 12 11/06/2018    CO2 22 (L) 11/06/2018    HGBA1C 5.8 (H) 12/21/2018       The 10-year ASCVD risk score ([place] DIVINE Jr., et al., 2013) is: 13.3%    Values used to calculate the score:      Age: 58 years      Sex: Female      Is Non- : Yes      Diabetic: No      Tobacco smoker: No      Systolic Blood Pressure: 184 mmHg      Is BP treated: No      HDL Cholesterol: 71 mg/dL      Total Cholesterol: 220 mg/dL    (Imaging have been independently reviewed)  CT abdomen with enterocolitis    Assessment:       1. Essential hypertension    2. Mental retardation    3. Functional incontinence    4. Mild episode of recurrent major depressive disorder    5. Mixed hyperlipidemia    6. Pre-diabetes    7. Cyanocobalamin deficiency    8. Normocytic anemia          Plan:       Binta was seen today for follow-up.    Diagnoses and all orders for this visit:    Essential hypertension  Comments:  BP persistently elevated. Starting Losartan. No assoc symptoms. Can improve diet. Nurse visit for BP check in 2 weeks. RTC 1 mo.  Orders:  -     losartan (COZAAR) 50 MG tablet; Take 1 tablet (50 mg total) by mouth once daily.    Mental retardation  Comments:  Needs encouragement and assistance to perform ADLs. Goes to adult .    Functional incontinence  Comments:  Fecal and urine.  Previous UA with asymptomatic bacteriuria; no need for antibiotics.  No urinary symptoms.  Discussed timed voiding    Mild episode of recurrent major depressive disorder  Comments:  Good mood.  Cont Prozac 40, Geodon 40. Might not be able to see OSH Psych, Dr. Kenyon anymore. Has refills. Psych referral.  Orders:  -     Ambulatory Referral to Psychiatry    Mixed hyperlipidemia  Comments:  ASCVD 13.  Discussed avoiding fast food, red meat    Pre-diabetes  Comments:  A1c 5.8.  Discussed avoiding soft drinks, fruit juice    Cyanocobalamin deficiency  Comments:  B12 189.  Started 1000 B12 daily  Orders:  -     cyanocobalamin  (VITAMIN B-12) 1000 MCG tablet; Take 1 tablet (1,000 mcg total) by mouth once daily.    Normocytic anemia  Comments:  Secondary to cyanocobalamin deficiency.  Up-to-date on pelvic exam, mammogram.  Has referral for colonoscopy         Notification of Lab Results: Phone Call  Astrid Yang brother.  [phone]    Side effects of medication(s) were discussed in detail and patient voiced understanding.  Patient will call back for any issues or complications.     RTC in 1 month(s) or sooner PRN for elevated blood pressure with BP log.  Nurse visit in 1-2 weeks for BP check.

## 2019-01-11 ENCOUNTER — OFFICE VISIT (OUTPATIENT)
Dept: INTERNAL MEDICINE | Facility: CLINIC | Age: 59
End: 2019-01-11
Payer: MEDICARE

## 2019-01-11 VITALS
BODY MASS INDEX: 27.81 KG/M2 | HEART RATE: 93 BPM | WEIGHT: 173.06 LBS | OXYGEN SATURATION: 99 % | HEIGHT: 66 IN | SYSTOLIC BLOOD PRESSURE: 184 MMHG | DIASTOLIC BLOOD PRESSURE: 84 MMHG

## 2019-01-11 DIAGNOSIS — E78.2 MIXED HYPERLIPIDEMIA: ICD-10-CM

## 2019-01-11 DIAGNOSIS — I10 ESSENTIAL HYPERTENSION: Primary | ICD-10-CM

## 2019-01-11 DIAGNOSIS — I10 ESSENTIAL HYPERTENSION: ICD-10-CM

## 2019-01-11 DIAGNOSIS — F33.0 MILD EPISODE OF RECURRENT MAJOR DEPRESSIVE DISORDER: ICD-10-CM

## 2019-01-11 DIAGNOSIS — F79 MENTAL RETARDATION: ICD-10-CM

## 2019-01-11 DIAGNOSIS — E53.8 CYANOCOBALAMIN DEFICIENCY: ICD-10-CM

## 2019-01-11 DIAGNOSIS — R39.81 FUNCTIONAL INCONTINENCE: ICD-10-CM

## 2019-01-11 DIAGNOSIS — D64.9 NORMOCYTIC ANEMIA: ICD-10-CM

## 2019-01-11 DIAGNOSIS — R73.03 PRE-DIABETES: ICD-10-CM

## 2019-01-11 PROCEDURE — 99999 PR PBB SHADOW E&M-EST. PATIENT-LVL IV: ICD-10-PCS | Mod: PBBFAC,,, | Performed by: INTERNAL MEDICINE

## 2019-01-11 PROCEDURE — 99999 PR PBB SHADOW E&M-EST. PATIENT-LVL IV: CPT | Mod: PBBFAC,,, | Performed by: INTERNAL MEDICINE

## 2019-01-11 PROCEDURE — 99215 PR OFFICE/OUTPT VISIT, EST, LEVL V, 40-54 MIN: ICD-10-PCS | Mod: S$PBB,,, | Performed by: INTERNAL MEDICINE

## 2019-01-11 PROCEDURE — 99215 OFFICE O/P EST HI 40 MIN: CPT | Mod: S$PBB,,, | Performed by: INTERNAL MEDICINE

## 2019-01-11 PROCEDURE — 99214 OFFICE O/P EST MOD 30 MIN: CPT | Mod: PBBFAC | Performed by: INTERNAL MEDICINE

## 2019-01-11 RX ORDER — LANOLIN ALCOHOL/MO/W.PET/CERES
1000 CREAM (GRAM) TOPICAL DAILY
Qty: 90 TABLET | Refills: 1 | Status: SHIPPED | OUTPATIENT
Start: 2019-01-11 | End: 2019-05-29 | Stop reason: SDUPTHER

## 2019-01-11 RX ORDER — LOSARTAN POTASSIUM 50 MG/1
50 TABLET ORAL DAILY
Qty: 30 TABLET | Refills: 1 | Status: SHIPPED | OUTPATIENT
Start: 2019-01-11 | End: 2019-01-25 | Stop reason: SDUPTHER

## 2019-01-11 RX ORDER — LOSARTAN POTASSIUM 50 MG/1
TABLET ORAL
Qty: 90 TABLET | Refills: 1 | OUTPATIENT
Start: 2019-01-11

## 2019-01-11 RX ORDER — ZIPRASIDONE HYDROCHLORIDE 40 MG/1
CAPSULE ORAL
Refills: 2 | COMMUNITY
Start: 2019-01-04 | End: 2019-05-29 | Stop reason: SDUPTHER

## 2019-01-11 NOTE — PATIENT INSTRUCTIONS
Please take over-the-counter vitamin-B12 (cyanocobalamin) 1000 mcg a day.    I would encourage you to try to exercise for about 30 minutes a day, 5 days a week; it can even be as simple as brisk walking.  In addition, try to eat a low fat diet by avoiding fried food, butter, red meat, cheese and saturated fat.  Try to limit sugar, sweets and refined grains, which are found in white bread, white rice, most forms of pasta and packaged snack foods.  You can also try to eat more fiber through fruits and vegetables, oats, beans, nuts, and fish.    Other psychiatrists:  Dr. Celio Cotot - [phone]  Dr. Trenton Peterson - [phone]  Dr. Ivory Kitchen - [phone]  Dr. Jemima Ross - [phone]  Dr. Roge Oden - [phone]

## 2019-01-25 ENCOUNTER — CLINICAL SUPPORT (OUTPATIENT)
Dept: INTERNAL MEDICINE | Facility: CLINIC | Age: 59
End: 2019-01-25
Payer: MEDICARE

## 2019-01-25 ENCOUNTER — TELEPHONE (OUTPATIENT)
Dept: INTERNAL MEDICINE | Facility: CLINIC | Age: 59
End: 2019-01-25

## 2019-01-25 VITALS — OXYGEN SATURATION: 98 % | HEART RATE: 83 BPM | DIASTOLIC BLOOD PRESSURE: 80 MMHG | SYSTOLIC BLOOD PRESSURE: 184 MMHG

## 2019-01-25 DIAGNOSIS — I10 ESSENTIAL HYPERTENSION: ICD-10-CM

## 2019-01-25 PROCEDURE — 99999 PR PBB SHADOW E&M-EST. PATIENT-LVL I: CPT | Mod: PBBFAC,,,

## 2019-01-25 PROCEDURE — 99211 OFF/OP EST MAY X REQ PHY/QHP: CPT | Mod: PBBFAC

## 2019-01-25 PROCEDURE — 99999 PR PBB SHADOW E&M-EST. PATIENT-LVL I: ICD-10-PCS | Mod: PBBFAC,,,

## 2019-01-25 RX ORDER — LOSARTAN POTASSIUM 100 MG/1
100 TABLET ORAL DAILY
Qty: 30 TABLET | Refills: 1 | Status: SHIPPED | OUTPATIENT
Start: 2019-01-25 | End: 2019-02-01 | Stop reason: SDUPTHER

## 2019-01-25 RX ORDER — LOSARTAN POTASSIUM 100 MG/1
TABLET ORAL
Qty: 90 TABLET | Refills: 1 | OUTPATIENT
Start: 2019-01-25

## 2019-01-25 NOTE — PROGRESS NOTES
Binta ARPAN Trey 58 y.o. female is here today for Blood Pressure check.   History of HTN yes.    Review of patient's allergies indicates:  No Known Allergies  Creatinine   Date Value Ref Range Status   11/06/2018 0.9 0.5 - 1.4 mg/dL Final     Sodium   Date Value Ref Range Status   11/06/2018 139 136 - 145 mmol/L Final     Potassium   Date Value Ref Range Status   11/06/2018 3.5 3.5 - 5.1 mmol/L Final   ]  Patient verifies taking blood pressure medications on a regular basis at the same time of the day.     Current Outpatient Medications:     cyanocobalamin (VITAMIN B-12) 1000 MCG tablet, Take 1 tablet (1,000 mcg total) by mouth once daily., Disp: 90 tablet, Rfl: 1    FLUoxetine (PROZAC) 40 MG capsule, Take 40 mg by mouth once daily., Disp: , Rfl:     losartan (COZAAR) 50 MG tablet, Take 1 tablet (50 mg total) by mouth once daily., Disp: 30 tablet, Rfl: 1    ziprasidone (GEODON) 40 MG Cap, TK 1 C PO QHS, Disp: , Rfl: 2  Does patient have record of home blood pressure readings yes. Readings have been averaging 160's-180's/70's-90's.   Last dose of blood pressure medication was taken at 0715am.  Patient is asymptomatic.   .    184/80 , Pulse: 83 .    Blood pressure reading after 15 minutes was 170/78, Pulse 80.  Dr. Weir notified.

## 2019-02-01 ENCOUNTER — CLINICAL SUPPORT (OUTPATIENT)
Dept: INTERNAL MEDICINE | Facility: CLINIC | Age: 59
End: 2019-02-01
Payer: MEDICARE

## 2019-02-01 VITALS — HEART RATE: 83 BPM | SYSTOLIC BLOOD PRESSURE: 138 MMHG | DIASTOLIC BLOOD PRESSURE: 68 MMHG | OXYGEN SATURATION: 99 %

## 2019-02-01 DIAGNOSIS — I10 ESSENTIAL HYPERTENSION: ICD-10-CM

## 2019-02-01 PROCEDURE — 99999 PR PBB SHADOW E&M-EST. PATIENT-LVL II: ICD-10-PCS | Mod: PBBFAC,,,

## 2019-02-01 PROCEDURE — 99212 OFFICE O/P EST SF 10 MIN: CPT | Mod: PBBFAC

## 2019-02-01 PROCEDURE — 99999 PR PBB SHADOW E&M-EST. PATIENT-LVL II: CPT | Mod: PBBFAC,,,

## 2019-02-01 RX ORDER — AMLODIPINE BESYLATE 5 MG/1
5 TABLET ORAL DAILY
Qty: 30 TABLET | Refills: 1 | Status: SHIPPED | OUTPATIENT
Start: 2019-02-01 | End: 2019-02-11 | Stop reason: SDUPTHER

## 2019-02-01 RX ORDER — AMLODIPINE BESYLATE 5 MG/1
TABLET ORAL
Qty: 90 TABLET | Refills: 1 | OUTPATIENT
Start: 2019-02-01

## 2019-02-01 RX ORDER — LOSARTAN POTASSIUM 100 MG/1
100 TABLET ORAL DAILY
Qty: 90 TABLET | Refills: 0 | Status: SHIPPED | OUTPATIENT
Start: 2019-02-01 | End: 2019-05-29

## 2019-02-01 NOTE — TELEPHONE ENCOUNTER
Spoke with patient family stating that her blood pressure is still elevated so to continue taking her medication but a new medication was added for the patient to take also . We tried to schedule a nurse visit for the patient in 2 weeks but patient family said thy cant schedule that right now they have to call us back

## 2019-02-01 NOTE — PROGRESS NOTES
Adilsonleida Yang 58 y.o. female is here today for Blood Pressure check.   History of HTN yes.    Review of patient's allergies indicates:  No Known Allergies  Creatinine   Date Value Ref Range Status   11/06/2018 0.9 0.5 - 1.4 mg/dL Final     Sodium   Date Value Ref Range Status   11/06/2018 139 136 - 145 mmol/L Final     Potassium   Date Value Ref Range Status   11/06/2018 3.5 3.5 - 5.1 mmol/L Final   ]  Patient verifies taking blood pressure medications on a regular basis at the same time of the day.     Current Outpatient Medications:     cyanocobalamin (VITAMIN B-12) 1000 MCG tablet, Take 1 tablet (1,000 mcg total) by mouth once daily., Disp: 90 tablet, Rfl: 1    FLUoxetine (PROZAC) 40 MG capsule, Take 40 mg by mouth once daily., Disp: , Rfl:     losartan (COZAAR) 100 MG tablet, Take 1 tablet (100 mg total) by mouth once daily., Disp: 30 tablet, Rfl: 1    ziprasidone (GEODON) 40 MG Cap, TK 1 C PO QHS, Disp: , Rfl: 2  Does patient have record of home blood pressure readings no. Readings have been averaging unknown.   Last dose of blood pressure medication was taken at 0715am.  Patient is asymptomatic.       BP: (!) 160/80 , Pulse: 83 .    Blood pressure reading after 15 minutes was 148/68, Pulse 78.  Dr. Weir notified.

## 2019-02-01 NOTE — TELEPHONE ENCOUNTER
Does patient have record of home blood pressure readings no. Readings have been averaging unknown.   Last dose of blood pressure medication was taken at 0715am.  Patient is asymptomatic.       BP: (!) 160/80 , Pulse: 83 .    Blood pressure reading after 15 minutes was 148/68, Pulse 78.  Dr. Weir notified.

## 2019-02-08 NOTE — PROGRESS NOTES
Subjective:       Patient ID: Binta Yang is a 58 y.o. female who  has a past medical history of Cyanocobalamin deficiency, Depression, Essential hypertension, Hyperlipidemia, Mental retardation, Normocytic anemia, and Pre-diabetes.    Chief Complaint: Follow-up (HTN)    HPI    History was obtained from the patient and supplemented through chart review and from her sister-in-law (Irma) who accompanied the patient.    There were no ER or clinic visits since our last appointment.    Presents to follow-up for HTN.    HTN:  BP varied in the past up to 180s.  Has been compliant with losartan 100 and Norvasc 5.  Her sister-in-law has checks her BP at home, 160/85.  The lowest was 145-140/70.  Home BP Usually 150s.  Denies CP, SOB, LIZAMA, lightheadedness, dizziness.      Her brother and sister-in-law prepare meals.  Decreased soft drinks, is drinking more water.  Eats deli meats her lunch at the adult day care.  She prefers to eat food with soft texture, such as mashed potatoes.    MR:  At baseline, she has a developmental delay/mild MR.  The patient's brother is her guardian.  She attends an adult  and lives with her brother and sister in law.  Her family prepares her meals.  She can bathe herself if her family members prepare the water.  She requires frequent reminders to shower and brush her teeth.  She can walk and transfer herself without difficulty. She has chronic facial asymmetry and her sister-in-law states her mouth is usually twisted towards the right.  The patient is usually quiet.  She can nod her head and answer in one-word responses; has appropriate responses.         Functional fecal and urinary incontinence:  Needs to change her clothes about 3 to 4 times a week.  Requires a lot of encouragement to do ADLs.  Her sister-in-law thinks that she waits until the last minute and therefore can't get to the restroom in time.  The patient denies dysuria, stress incontinence.     Depression:  Her  "psychiatrist is Dr. Fam Urias, but they can't see him anymore due to change in insurance.  Her daughter passed away 2-3 years ago who had Down syndrome and MR.  She is taking Prozac 40, Geodon 40.  Reports good mood, good appetite.  Denies nausea or vomiting.  Denies insomnia.    Hyperlipidemia:  ASCVD 6.7. Total cholesterol 220.  Not on statin.  Diet as above.  Also eats fast food and quick meals sometimes.    Pre diabetes:  Drinks soft drinks and fruit juice as above.  Trying to drink more water.  Lab Results   Component Value Date    HGBA1C 5.8 (H) 12/21/2018     Vitamin B12 deficiency and normocytic anemia:  B12 Low at 189.  Has not yet started 1000 mcg a day.  Up-to-date on mammogram.  Already ordered a referral for colonoscopy.  OBGYN for pelvic exam q2 years.  History of hysterectomy for hygienic reasons and self-care.    Review of Systems   Constitutional: Negative for fever and unexpected weight change.   HENT: Negative for rhinorrhea and sneezing.    Eyes: Negative for redness and itching.   Respiratory: Negative for shortness of breath and wheezing.    Cardiovascular: Negative for chest pain, palpitations and leg swelling.   Gastrointestinal: Negative for abdominal pain and diarrhea.   Genitourinary: Negative for dysuria and hematuria.   Musculoskeletal: Negative for gait problem and joint swelling.   Skin: Negative for color change and rash.   Neurological: Negative for weakness and headaches.   Hematological: Negative for adenopathy.   Psychiatric/Behavioral: Negative for dysphoric mood and self-injury.       I personally reviewed Past Medical History, Past Surgical History, Social History, and Family History.    Objective:      Vitals:    02/11/19 0806   BP: 130/82   Pulse: 91   SpO2: 98%   Weight: 77.7 kg (171 lb 4.8 oz)   Height: 5' 6" (1.676 m)      Physical Exam   Constitutional: She appears well-developed and well-nourished. No distress.   HENT:   Head: Normocephalic and atraumatic.   Nose: " Nose normal.   Mouth/Throat: Oropharynx is clear and moist. No oropharyngeal exudate.   Eyes: Conjunctivae and EOM are normal. Pupils are equal, round, and reactive to light. Right eye exhibits no discharge. Left eye exhibits no discharge. No scleral icterus.   Neck: Neck supple. No tracheal deviation present. No thyromegaly present.   Cardiovascular: Normal rate, regular rhythm, normal heart sounds and intact distal pulses.   No murmur heard.  Pulmonary/Chest: Effort normal and breath sounds normal. No respiratory distress. She has no wheezes.   Abdominal: Soft. Bowel sounds are normal. She exhibits no distension. There is no tenderness.   Musculoskeletal: She exhibits no edema or deformity.   Lymphadenopathy:     She has no cervical adenopathy.   Neurological: She is alert. No cranial nerve deficit.   Mouth twisted to the R, chronic  Follows commands   Skin: Skin is warm and dry. Capillary refill takes less than 2 seconds. She is not diaphoretic. No erythema.   Psychiatric: She has a normal mood and affect. Her behavior is normal.   Paucity of speech.  A few word answers. Nods appropriately         Lab Results   Component Value Date    WBC 4.18 11/06/2018    HGB 10.8 (L) 11/06/2018    HCT 33.9 (L) 11/06/2018     11/06/2018    CHOL 220 (H) 12/21/2018    TRIG 71 12/21/2018    HDL 71 12/21/2018    ALT 18 11/06/2018    AST 22 11/06/2018     11/06/2018    K 3.5 11/06/2018     11/06/2018    CREATININE 0.9 11/06/2018    BUN 12 11/06/2018    CO2 22 (L) 11/06/2018    HGBA1C 5.8 (H) 12/21/2018       The 10-year ASCVD risk score (Marco DIVINE Jr., et al., 2013) is: 6.7%    Values used to calculate the score:      Age: 58 years      Sex: Female      Is Non- : Yes      Diabetic: No      Tobacco smoker: No      Systolic Blood Pressure: 130 mmHg      Is BP treated: Yes      HDL Cholesterol: 71 mg/dL      Total Cholesterol: 220 mg/dL    (Imaging have been independently reviewed)  CT abdomen  with enterocolitis    Assessment:       1. Essential hypertension    2. Mental retardation    3. Functional incontinence    4. Mild episode of recurrent major depressive disorder    5. Mixed hyperlipidemia    6. Pre-diabetes    7. Cyanocobalamin deficiency    8. Normocytic anemia    9. Flu vaccine need    10. Need for tetanus, diphtheria, and acellular pertussis (Tdap) vaccine    11. Need for zoster vaccine    12. Colon cancer screening          Plan:       Binta was seen today for follow-up.    Diagnoses and all orders for this visit:    Essential hypertension  Comments:  Not at goal. BP usually 150s at home.  Increasing Norvasc from 5 to 10 mg.  Continue losartan 100.  Orders:  -     amLODIPine (NORVASC) 10 MG tablet; Take 1 tablet (10 mg total) by mouth once daily.    Mental retardation  Comments:  Needs encouragement and assistance to perform ADLs. Goes to adult .    Functional incontinence  Comments:  Fecal and urine.  Currently without urinary symptoms.  Discussed timed voiding    Mild episode of recurrent major depressive disorder  Comments:  Good mood on Prozac 40, Geodon 40, continue.  Providing phone numbers for Psychiatry.    Mixed hyperlipidemia  Comments:  ASCVD borderline 6.7.  Difficulty with diet since she goes to adult  and can be a picky eater.  Rechecking FLP, considering starting statin  Orders:  -     Lipid panel; Future    Pre-diabetes  Comments:  A1c 5.8.  Decreased soft drink intake.    Cyanocobalamin deficiency  Comments:  B12 189.  Continue B12 1000 mcg supplement    Normocytic anemia  Comments:  B12 deficiency as above. UTD on pelvic exam, MMG.  Has referral for C scope.    Flu vaccine need  Comments:  Will get at pharmacy downstairs    Need for tetanus, diphtheria, and acellular pertussis (Tdap) vaccine  Comments:  Will get at pharmacy downstairs    Need for zoster vaccine  Comments:  Will get at pharmacy downstairs    Colon cancer screening  Comments:  Already has  referral to [facility].  Family prefers colonoscopy over fit kit since it is difficult to monitor her BMs         Notification of Lab Results: Phone Call  Astrid Yang, brother.  [phone]    Side effects of medication(s) were discussed in detail and patient voiced understanding.  Patient will call back for any issues or complications.     RTC in 3 month(s) or sooner PRN for HTN.

## 2019-02-11 ENCOUNTER — OFFICE VISIT (OUTPATIENT)
Dept: INTERNAL MEDICINE | Facility: CLINIC | Age: 59
End: 2019-02-11
Payer: MEDICARE

## 2019-02-11 ENCOUNTER — IMMUNIZATION (OUTPATIENT)
Dept: PHARMACY | Facility: CLINIC | Age: 59
End: 2019-02-11

## 2019-02-11 ENCOUNTER — IMMUNIZATION (OUTPATIENT)
Dept: PHARMACY | Facility: CLINIC | Age: 59
End: 2019-02-11
Payer: MEDICARE

## 2019-02-11 VITALS
SYSTOLIC BLOOD PRESSURE: 130 MMHG | HEART RATE: 91 BPM | WEIGHT: 171.31 LBS | BODY MASS INDEX: 27.53 KG/M2 | OXYGEN SATURATION: 98 % | DIASTOLIC BLOOD PRESSURE: 82 MMHG | HEIGHT: 66 IN

## 2019-02-11 DIAGNOSIS — Z23 FLU VACCINE NEED: ICD-10-CM

## 2019-02-11 DIAGNOSIS — E53.8 CYANOCOBALAMIN DEFICIENCY: ICD-10-CM

## 2019-02-11 DIAGNOSIS — E78.2 MIXED HYPERLIPIDEMIA: ICD-10-CM

## 2019-02-11 DIAGNOSIS — R39.81 FUNCTIONAL INCONTINENCE: ICD-10-CM

## 2019-02-11 DIAGNOSIS — Z12.11 COLON CANCER SCREENING: ICD-10-CM

## 2019-02-11 DIAGNOSIS — F79 MENTAL RETARDATION: ICD-10-CM

## 2019-02-11 DIAGNOSIS — D64.9 NORMOCYTIC ANEMIA: ICD-10-CM

## 2019-02-11 DIAGNOSIS — I10 ESSENTIAL HYPERTENSION: Primary | ICD-10-CM

## 2019-02-11 DIAGNOSIS — F33.0 MILD EPISODE OF RECURRENT MAJOR DEPRESSIVE DISORDER: ICD-10-CM

## 2019-02-11 DIAGNOSIS — Z23 NEED FOR TETANUS, DIPHTHERIA, AND ACELLULAR PERTUSSIS (TDAP) VACCINE: ICD-10-CM

## 2019-02-11 DIAGNOSIS — Z23 NEED FOR ZOSTER VACCINE: ICD-10-CM

## 2019-02-11 DIAGNOSIS — R73.03 PRE-DIABETES: ICD-10-CM

## 2019-02-11 PROCEDURE — 99215 OFFICE O/P EST HI 40 MIN: CPT | Mod: S$PBB,,, | Performed by: INTERNAL MEDICINE

## 2019-02-11 PROCEDURE — 99999 PR PBB SHADOW E&M-EST. PATIENT-LVL III: ICD-10-PCS | Mod: PBBFAC,,, | Performed by: INTERNAL MEDICINE

## 2019-02-11 PROCEDURE — 99213 OFFICE O/P EST LOW 20 MIN: CPT | Mod: PBBFAC,25 | Performed by: INTERNAL MEDICINE

## 2019-02-11 PROCEDURE — 99215 PR OFFICE/OUTPT VISIT, EST, LEVL V, 40-54 MIN: ICD-10-PCS | Mod: S$PBB,,, | Performed by: INTERNAL MEDICINE

## 2019-02-11 PROCEDURE — 99999 PR PBB SHADOW E&M-EST. PATIENT-LVL III: CPT | Mod: PBBFAC,,, | Performed by: INTERNAL MEDICINE

## 2019-02-11 RX ORDER — AMLODIPINE BESYLATE 10 MG/1
10 TABLET ORAL DAILY
Qty: 90 TABLET | Refills: 0 | Status: SHIPPED | OUTPATIENT
Start: 2019-02-11 | End: 2019-05-29 | Stop reason: SDUPTHER

## 2019-02-11 NOTE — PATIENT INSTRUCTIONS
Other psychiatrists:  Dr. Celio Cotto - [phone]  Dr. Trenton Peterson - [phone]  Dr. Ivory Kitchen - [phone]  Dr. Jemima Ross - [phone]  Dr. Roge Oden - [phone]

## 2019-02-15 DIAGNOSIS — Z12.11 COLON CANCER SCREENING: ICD-10-CM

## 2019-04-12 ENCOUNTER — HOSPITAL ENCOUNTER (EMERGENCY)
Facility: OTHER | Age: 59
Discharge: HOME | End: 2019-04-12
Attending: EMERGENCY MEDICINE
Payer: MEDICARE

## 2019-04-12 VITALS
BODY MASS INDEX: 29.99 KG/M2 | SYSTOLIC BLOOD PRESSURE: 108 MMHG | RESPIRATION RATE: 10 BRPM | HEIGHT: 65 IN | OXYGEN SATURATION: 100 % | TEMPERATURE: 99 F | WEIGHT: 180 LBS | HEART RATE: 70 BPM | DIASTOLIC BLOOD PRESSURE: 53 MMHG

## 2019-04-12 DIAGNOSIS — R40.20 LOC (LOSS OF CONSCIOUSNESS): ICD-10-CM

## 2019-04-12 DIAGNOSIS — R40.4 ALTERED AWARENESS, TRANSIENT: ICD-10-CM

## 2019-04-12 DIAGNOSIS — R11.10 VOMITING, INTRACTABILITY OF VOMITING NOT SPECIFIED, PRESENCE OF NAUSEA NOT SPECIFIED, UNSPECIFIED VOMITING TYPE: Primary | ICD-10-CM

## 2019-04-12 LAB
ALBUMIN SERPL BCP-MCNC: 3.3 G/DL (ref 3.5–5.2)
ALP SERPL-CCNC: 104 U/L (ref 55–135)
ALT SERPL W/O P-5'-P-CCNC: 13 U/L (ref 10–44)
ANION GAP SERPL CALC-SCNC: 9 MMOL/L (ref 8–16)
AST SERPL-CCNC: 15 U/L (ref 10–40)
BASOPHILS # BLD AUTO: 0.04 K/UL (ref 0–0.2)
BASOPHILS NFR BLD: 0.8 % (ref 0–1.9)
BILIRUB SERPL-MCNC: 0.7 MG/DL (ref 0.1–1)
BILIRUB UR QL STRIP: NEGATIVE
BUN SERPL-MCNC: 14 MG/DL (ref 6–20)
CALCIUM SERPL-MCNC: 9 MG/DL (ref 8.7–10.5)
CHLORIDE SERPL-SCNC: 106 MMOL/L (ref 95–110)
CLARITY UR: CLEAR
CO2 SERPL-SCNC: 24 MMOL/L (ref 23–29)
COLOR UR: YELLOW
CREAT SERPL-MCNC: 1.1 MG/DL (ref 0.5–1.4)
DIFFERENTIAL METHOD: ABNORMAL
EOSINOPHIL # BLD AUTO: 0.1 K/UL (ref 0–0.5)
EOSINOPHIL NFR BLD: 2.7 % (ref 0–8)
ERYTHROCYTE [DISTWIDTH] IN BLOOD BY AUTOMATED COUNT: 14.8 % (ref 11.5–14.5)
EST. GFR  (AFRICAN AMERICAN): >60 ML/MIN/1.73 M^2
EST. GFR  (NON AFRICAN AMERICAN): 55 ML/MIN/1.73 M^2
GLUCOSE SERPL-MCNC: 234 MG/DL (ref 70–110)
GLUCOSE UR QL STRIP: NEGATIVE
HCT VFR BLD AUTO: 34.6 % (ref 37–48.5)
HGB BLD-MCNC: 10.9 G/DL (ref 12–16)
HGB UR QL STRIP: NEGATIVE
KETONES UR QL STRIP: NEGATIVE
LEUKOCYTE ESTERASE UR QL STRIP: NEGATIVE
LIPASE SERPL-CCNC: 3 U/L (ref 4–60)
LYMPHOCYTES # BLD AUTO: 2.4 K/UL (ref 1–4.8)
LYMPHOCYTES NFR BLD: 48.4 % (ref 18–48)
MCH RBC QN AUTO: 25.9 PG (ref 27–31)
MCHC RBC AUTO-ENTMCNC: 31.5 G/DL (ref 32–36)
MCV RBC AUTO: 82 FL (ref 82–98)
MONOCYTES # BLD AUTO: 0.5 K/UL (ref 0.3–1)
MONOCYTES NFR BLD: 9.4 % (ref 4–15)
NEUTROPHILS # BLD AUTO: 1.9 K/UL (ref 1.8–7.7)
NEUTROPHILS NFR BLD: 38.7 % (ref 38–73)
NITRITE UR QL STRIP: NEGATIVE
PH UR STRIP: 6 [PH] (ref 5–8)
PLATELET # BLD AUTO: 271 K/UL (ref 150–350)
PMV BLD AUTO: 9.9 FL (ref 9.2–12.9)
POTASSIUM SERPL-SCNC: 3.3 MMOL/L (ref 3.5–5.1)
PROT SERPL-MCNC: 7.1 G/DL (ref 6–8.4)
PROT UR QL STRIP: ABNORMAL
RBC # BLD AUTO: 4.21 M/UL (ref 4–5.4)
SODIUM SERPL-SCNC: 139 MMOL/L (ref 136–145)
SP GR UR STRIP: 1.02 (ref 1–1.03)
URN SPEC COLLECT METH UR: ABNORMAL
UROBILINOGEN UR STRIP-ACNC: NEGATIVE EU/DL
WBC # BLD AUTO: 4.9 K/UL (ref 3.9–12.7)

## 2019-04-12 PROCEDURE — 99285 EMERGENCY DEPT VISIT HI MDM: CPT | Mod: 25

## 2019-04-12 PROCEDURE — 85025 COMPLETE CBC W/AUTO DIFF WBC: CPT

## 2019-04-12 PROCEDURE — 83690 ASSAY OF LIPASE: CPT

## 2019-04-12 PROCEDURE — 25000003 PHARM REV CODE 250: Performed by: EMERGENCY MEDICINE

## 2019-04-12 PROCEDURE — 63600175 PHARM REV CODE 636 W HCPCS: Performed by: EMERGENCY MEDICINE

## 2019-04-12 PROCEDURE — 80053 COMPREHEN METABOLIC PANEL: CPT

## 2019-04-12 PROCEDURE — 93010 ELECTROCARDIOGRAM REPORT: CPT | Mod: ,,, | Performed by: INTERNAL MEDICINE

## 2019-04-12 PROCEDURE — 81003 URINALYSIS AUTO W/O SCOPE: CPT

## 2019-04-12 PROCEDURE — 93005 ELECTROCARDIOGRAM TRACING: CPT

## 2019-04-12 PROCEDURE — 93010 EKG 12-LEAD: ICD-10-PCS | Mod: ,,, | Performed by: INTERNAL MEDICINE

## 2019-04-12 PROCEDURE — 96374 THER/PROPH/DIAG INJ IV PUSH: CPT

## 2019-04-12 PROCEDURE — 96361 HYDRATE IV INFUSION ADD-ON: CPT

## 2019-04-12 RX ORDER — SODIUM CHLORIDE 9 MG/ML
1000 INJECTION, SOLUTION INTRAVENOUS
Status: COMPLETED | OUTPATIENT
Start: 2019-04-12 | End: 2019-04-12

## 2019-04-12 RX ORDER — ONDANSETRON 2 MG/ML
4 INJECTION INTRAMUSCULAR; INTRAVENOUS
Status: COMPLETED | OUTPATIENT
Start: 2019-04-12 | End: 2019-04-12

## 2019-04-12 RX ADMIN — ONDANSETRON 4 MG: 2 INJECTION, SOLUTION INTRAMUSCULAR; INTRAVENOUS at 12:04

## 2019-04-12 RX ADMIN — SODIUM CHLORIDE 1000 ML: 0.9 INJECTION, SOLUTION INTRAVENOUS at 12:04

## 2019-04-12 NOTE — ED NOTES
Patient Identifiers for Binta Yang checked and correct  Family and EMS reports seizure like activity PTA, pt takes no seizure medicine but has history of previous seizure  LOC: The patient is awake, alert and aware of environment with an appropriate affect, history of MR, does answer yes or no to simple questions. Incontinent of urine and stool, cleaned and dry   APPEARANCE: Patient resting comfortably and in no acute distress, patient is clean and well groomed, patient's clothing is properly fastened.  SKIN: The skin is warm and dry, patient has normal skin turgor and moist mucus membranes,no rashes or lesions.Skin Intact , No Breakdown Noted  Musculoskeletal :  Normal range of motion noted. Moves all extremeties well, No swelling or tenderness noted  RESPIRATORY: Airway is open and patent, respirations are spontaneous, patient has a normal effort and rate..  CARDIAC: Patient has a normal rate and rhythm, no periphreal edema noted, capillary refill < 3 seconds.   ABDOMEN: Soft and non tender to palpation, no distention noted. Bowels Sounds are active and present  PULSES: 2+  And symmetrical in all extremeties  NEUROLOGIC: PERRL, Pupils 4mm and Reactjive Strength intact. facial expression is symmetrical, patient moving all extremities, normal sensation in all extremities when touched with a finger.The patient is awake, alert and cooperative with a calm affect, patient is aware of environment.    Will continue to monitor

## 2019-04-12 NOTE — ED PROVIDER NOTES
"Encounter Date: 4/12/2019    SCRIBE #1 NOTE: I, Lissette Aldrich, am scribing for, and in the presence of, Dr. Ambrosio.       History     Chief Complaint   Patient presents with    Seizures     pt with absent sz today . pt aaox3 now. pt has pmh of sz. afib  on moitor for ems  now in [facility].     Time seen by provider: 12:05 PM    This is a 58 y.o. female with hx of HTN, HLD, MR, and reported seizures who presents via EMS with complaint of seizure. Family is unsure what her typical seizures look like. Family reports vomiting. Pt denies current nausea or any pain. Pt is acting at baseline as per family. Family denies use of tobacco, alcohol, or illicit drugs.    The history is provided by the patient and a relative. The history is limited by the condition of the patient.     Review of patient's allergies indicates:  No Known Allergies  Past Medical History:   Diagnosis Date    Cyanocobalamin deficiency     Depression     Essential hypertension     Hyperlipidemia     Mental retardation     Normocytic anemia     Pre-diabetes      Past Surgical History:   Procedure Laterality Date    CARDIAC SURGERY      "hole in the heart", during childhood    HYSTERECTOMY      difficulties with personal hygiene     Family History   Problem Relation Age of Onset    Diabetes Mother     Asthma Mother     Hypertension Mother     Diabetes Father     Hypertension Father     Prostate cancer Brother     Diverticulitis Brother     Heart disease Daughter 35    Mental retardation Daughter     Congenital heart disease Daughter     Down syndrome Daughter     Mental retardation Son         mild, but functional    Breast cancer Neg Hx     Colon cancer Neg Hx     Ovarian cancer Neg Hx      Social History     Tobacco Use    Smoking status: Never Smoker    Smokeless tobacco: Never Used   Substance Use Topics    Alcohol use: No    Drug use: No     Review of Systems   Constitutional: Negative for fever.   HENT: Negative for sore " throat.    Respiratory: Negative for shortness of breath.    Cardiovascular: Negative for chest pain.   Gastrointestinal: Positive for vomiting. Negative for nausea.   Genitourinary: Negative for dysuria.   Musculoskeletal: Negative for back pain.   Skin: Negative for rash.   Neurological: Positive for seizures. Negative for weakness.   Hematological: Does not bruise/bleed easily.       Physical Exam     Initial Vitals [04/12/19 1131]   BP Pulse Resp Temp SpO2   (!) 120/51 100 15 98.5 °F (36.9 °C) 97 %      MAP       --         Physical Exam    Nursing note and vitals reviewed.  Constitutional: She appears well-developed and well-nourished. She is not diaphoretic. No distress.   HENT:   Head: Atraumatic.   Eyes: EOM are normal.   Neck: Normal range of motion. Neck supple.   Cardiovascular: Normal rate and normal heart sounds.   Occasional irregular beats.   Pulmonary/Chest: Breath sounds normal. No respiratory distress.   Neurological:   Developmentally delayed, but at baseline per family. Answers simple questions appropriately.   Skin: Skin is warm and dry.         ED Course   Procedures  Labs Reviewed   CBC W/ AUTO DIFFERENTIAL - Abnormal; Notable for the following components:       Result Value    Hemoglobin 10.9 (*)     Hematocrit 34.6 (*)     MCH 25.9 (*)     MCHC 31.5 (*)     RDW 14.8 (*)     Lymph% 48.4 (*)     All other components within normal limits   COMPREHENSIVE METABOLIC PANEL - Abnormal; Notable for the following components:    Potassium 3.3 (*)     Glucose 234 (*)     Albumin 3.3 (*)     eGFR if non  55 (*)     All other components within normal limits   LIPASE - Abnormal; Notable for the following components:    Lipase 3 (*)     All other components within normal limits   URINALYSIS, REFLEX TO URINE CULTURE - Abnormal; Notable for the following components:    Protein, UA Trace (*)     All other components within normal limits    Narrative:     Preferred Collection Type->Urine, Clean  Catch     EKG Readings: (Independently Interpreted)   Sinus rhythm at rate of 60 with PAC's. No ischemia.       Imaging Results    None          Medical Decision Making:   Clinical Tests:   Lab Tests: Ordered and Reviewed  Medical Tests: Ordered and Reviewed            Scribe Attestation:   Scribe #1: I performed the above scribed service and the documentation accurately describes the services I performed. I attest to the accuracy of the note.    Attending Attestation:           Physician Attestation for Scribe:  Physician Attestation Statement for Scribe #1: I, Dr. Ambrosio, reviewed documentation, as scribed by Lissette Aldrich in my presence, and it is both accurate and complete.           [facility] day school center.  There is report of episode of vomiting followed by episode which EMS thought may have represented an absence seizure.  There was question of a history of seizure disorder however chart review shows that prior EEGs showed no evidence of epileptic activity in episodes were felt not to be seizures, therefore not placed on antiepileptics.  The patient has remained at her normal mental status throughout her emergency department stay without further emesis or any further alterations in mental status.  Laboratory studies were unremarkable.  At this time it is unclear if this was a near syncopal episode due to vomiting/vagal activity or other episode causing alteration of loss of consciousness however as she has stable, no further problems, family feels comfortable taking her home and watch her I do not think she needs further workup or admission.  Return precautions discussed with her family.  Encouraged follow-up with primary care          ED Course as of Apr 13 1636 Fri Apr 12, 2019   1356 Reassessed. Remains at baseline according to daughter in law. No further vomiting or episodes of altered mental status.    [AC]      ED Course User Index  [AC] Lissette Aldrich     Clinical Impression:      1. Vomiting, intractability of vomiting not specified, presence of nausea not specified, unspecified vomiting type    2. LOC (loss of consciousness)    3. Altered awareness, transient                                 Tommie Ambrosio II, MD  04/13/19 8925

## 2019-05-17 ENCOUNTER — IMMUNIZATION (OUTPATIENT)
Dept: PHARMACY | Facility: CLINIC | Age: 59
End: 2019-05-17
Payer: MEDICARE

## 2019-05-28 NOTE — PROGRESS NOTES
"Subjective:       Patient ID: Binta Yang is a 58 y.o. female who  has a past medical history of Cyanocobalamin deficiency, Depression, Essential hypertension, Hyperlipidemia, Mental retardation, Normocytic anemia, and Pre-diabetes.    Chief Complaint: Follow-up; Urinary Incontinence; and Hypertension    HPI    History was obtained from the patient and supplemented through chart review and from her sister-in-law (Irma) who accompanied the patient.    -ED visit for ?absence seizures, vomiting. Was at b/l at time of eval. Prior EEGs 2014 w/o evidence of epileptic activity and was therefore not on AEDs.  Labs normal.  Unclear if this was near syncope 2/2 vomiting, vasovagal.  No further episodes.     Presents to follow-up for HTN.     Functional fecal and urinary incontinence:    Her sister-in-law thinks that she waits until the last minute and therefore can't get to the restroom in time and is "lazy to get up".  Pt denies dysuria, hematuria, cloudy appearing urine, odor to her urine, urinary urgency or frequency.  No fever, abdominal, flank pain pain.  No change in mental status, confusion.  Requires a lot of encouragement to do ADLs.  No stress incontinence.      HTN:    BP varied in the past up to 180s.  Has been compliant with Norvasc 10, but ran out of losartan 100 for over a month. Her sister-in-law has checks her BP at home, 120s on only Norvasc 10.  Denies CP, SOB, LIZAMA, lightheadedness, dizziness.       Her brother and sister-in-law prepare meals.  She does drink soft drinks.  Eats deli meats her lunch at the adult day care.  She prefers to eat food with soft texture, such as mashed potatoes.     Candidiasis:  The patient's sister noticed an odor from beneath her pannus.  Has been telling her to bath BID.    AR:  Family noticed that she has been sniffling with rhinorrhea, sneezing.    MR:    At baseline, she has a developmental delay/mild MR.  The patient's brother is her guardian.  She attends an adult "  and lives with her brother and sister in law.  Her family prepares her meals.  She can bathe herself if her family members prepare the water.  She requires frequent reminders to shower and brush her teeth.  She can walk and transfer herself without difficulty. She has chronic facial asymmetry and her sister-in-law states her mouth is usually twisted towards the right.  The patient is usually quiet.  She can nod her head and answer in one-word responses; has appropriate responses.    Depression:    Her psychiatrist is Dr. Fam Urias, but they can't see him anymore due to change in insurance.  Her daughter passed away 2-3 years ago who had Down syndrome and MR.  She is taking Geodon 40 qHS, but stopped taking Prozac 40 months ago.  Has been on Prozac 40 for a long time and used to be on Wellbutrin instead of Geodon.    Pt reports good mood, good appetite.  However, she sleeps during the day as soon as she gets home from day care, which is a change.  No SI.  Has not been able to see Psychiatry yet, next appointment in 1 year.     Hyperlipidemia:    Total cholesterol 220.  Not on statin.  Diet as above.  Also eats fast food and quick meals sometimes.  Lab Results   Component Value Date    LDLCALC 134.8 12/21/2018     The 10-year ASCVD risk score (Marco DIVINE Jr., et al., 2013) is: 5.1%    Values used to calculate the score:      Age: 58 years      Sex: Female      Is Non- : Yes      Diabetic: No      Tobacco smoker: No      Systolic Blood Pressure: 118 mmHg      Is BP treated: Yes      HDL Cholesterol: 71 mg/dL      Total Cholesterol: 220 mg/dL    Pre diabetes:    Drinks soft drinks and fruit juice as above.  Trying to drink more water.  Lab Results   Component Value Date    HGBA1C 5.8 (H) 12/21/2018     Vitamin B12 deficiency and normocytic anemia:    B12 Low at 189.  On B12 1000 mcg a day.  Up-to-date on mammogram.  Already ordered a referral for colonoscopy.  OBGYN for pelvic exam q2  "years.  History of hysterectomy for hygienic reasons and self-care.  Lab Results   Component Value Date    IRON 95 12/21/2018    TIBC 308 12/21/2018    FERRITIN 56 12/21/2018     Lab Results   Component Value Date    UAYSOCIM63 189 (L) 12/21/2018     Review of Systems   Constitutional: Negative for activity change and fever.   HENT: Positive for rhinorrhea. Negative for sneezing.    Eyes: Negative for redness and itching.   Respiratory: Negative for shortness of breath and wheezing.    Cardiovascular: Negative for chest pain, palpitations and leg swelling.   Gastrointestinal: Negative for abdominal pain and diarrhea.   Genitourinary: Negative for dysuria, flank pain, frequency, hematuria and urgency.   Musculoskeletal: Negative for gait problem and joint swelling.   Skin: Negative for color change and rash.   Neurological: Negative for weakness and headaches.   Hematological: Negative for adenopathy.   Psychiatric/Behavioral: Positive for dysphoric mood. Negative for confusion.       I personally reviewed Past Medical History, Past Surgical History, Social History, and Family History.    Objective:      Vitals:    05/29/19 1044   BP: 118/66   Pulse: 87   SpO2: 100%   Weight: 78.6 kg (173 lb 4.5 oz)   Height: 5' 5" (1.651 m)      Physical Exam   Constitutional: She appears well-developed and well-nourished. No distress.   HENT:   Head: Normocephalic and atraumatic.   Nose: Mucosal edema present. Right sinus exhibits no maxillary sinus tenderness and no frontal sinus tenderness. Left sinus exhibits no maxillary sinus tenderness and no frontal sinus tenderness.   Mouth/Throat: Oropharynx is clear and moist.   Unable to view posterior OP due to patient participation with exam   Eyes: Pupils are equal, round, and reactive to light. Conjunctivae and EOM are normal. Right eye exhibits no discharge. Left eye exhibits no discharge. No scleral icterus.   Neck: Neck supple. No tracheal deviation present. No thyromegaly " present.   Cardiovascular: Normal rate, regular rhythm, normal heart sounds and intact distal pulses.   No murmur heard.  Pulmonary/Chest: Effort normal and breath sounds normal. No respiratory distress. She has no wheezes.   Abdominal: Soft. Bowel sounds are normal. She exhibits no distension. There is no tenderness. There is no CVA tenderness.   Musculoskeletal: She exhibits no edema or deformity.   Lymphadenopathy:     She has no cervical adenopathy.   Neurological: She is alert. No cranial nerve deficit.   Mouth twisted to the R, chronic  Follows commands   Skin: Skin is warm and dry. Capillary refill takes less than 2 seconds. She is not diaphoretic. No erythema.   Psychiatric: She has a normal mood and affect. Her behavior is normal.   Paucity of speech.  A few word answers. Nods appropriately       Lab Results   Component Value Date    WBC 4.90 04/12/2019    HGB 10.9 (L) 04/12/2019    HCT 34.6 (L) 04/12/2019     04/12/2019    CHOL 220 (H) 12/21/2018    TRIG 71 12/21/2018    HDL 71 12/21/2018    ALT 13 04/12/2019    AST 15 04/12/2019     04/12/2019    K 3.3 (L) 04/12/2019     04/12/2019    CREATININE 1.1 04/12/2019    BUN 14 04/12/2019    CO2 24 04/12/2019    HGBA1C 5.8 (H) 12/21/2018       The 10-year ASCVD risk score (Bickmoreariel HASKINS Jr., et al., 2013) is: 5.1%    Values used to calculate the score:      Age: 58 years      Sex: Female      Is Non- : Yes      Diabetic: No      Tobacco smoker: No      Systolic Blood Pressure: 118 mmHg      Is BP treated: Yes      HDL Cholesterol: 71 mg/dL      Total Cholesterol: 220 mg/dL    In out catheterization UA clear, no leukocytes, nitrites, protein, blood.    (Imaging have been independently reviewed)  CT abdomen with enterocolitis  Mammogram without evidence of malignancy, BI-RADS 1.    Assessment:       1. Functional incontinence    2. Essential hypertension    3. Candidiasis of skin    4. Seasonal allergic rhinitis due to other  allergic trigger    5. Mental retardation    6. Mild episode of recurrent major depressive disorder    7. Mixed hyperlipidemia    8. Pre-diabetes    9. Cyanocobalamin deficiency    10. Normocytic anemia          Plan:       Binta was seen today for follow-up, urinary incontinence and hypertension.    Diagnoses and all orders for this visit:    Functional incontinence  Comments:  Fecal and urine. I&O cath UA, UCx since she might not be able to voice sx. UA neg. Discussed timed voiding.  Orders:  -     POCT urinalysis, dipstick or tablet reag  -     Urine culture  -     Urinalysis Microscopic    Essential hypertension  Comments:  At goal on only Norvasc 10. Cont to monitor off losartan 100 for now. Joseido will make earlier appt if BP >130  Orders:  -     Discontinue: amLODIPine (NORVASC) 10 MG tablet; Take 1 tablet (10 mg total) by mouth once daily.  -     amLODIPine (NORVASC) 10 MG tablet; Take 1 tablet (10 mg total) by mouth once daily.    Candidiasis of skin  Comments:  Advised to keep area clean and towel dry. Can use a blow dryer. Rec breathable clothing, cotton.  Nystatin powder b.i.d.  Orders:  -     nystatin (MYCOSTATIN) powder; Apply topically 2 (two) times daily. To affected area    Seasonal allergic rhinitis due to other allergic trigger  Comments:  Body nasal turbinates.  Will likely benefit from Flonase, but would have difficulty using this correctly.  Recommend oral antihistamine.    Mental retardation  Comments:  Needs encouragement and assistance to perform ADLs. Goes to adult .    Mild episode of recurrent major depressive disorder  Comments:  Cont Geodon 40. Restart Prozac 40. Providing phone numbers for Psychiatry.  Orders:  -     Discontinue: ziprasidone (GEODON) 40 MG Cap; Take 1 capsule (40 mg total) by mouth once daily.  -     FLUoxetine 40 MG capsule; Take 1 capsule (40 mg total) by mouth once daily.  -     ziprasidone (GEODON) 40 MG Cap; Take 1 capsule (40 mg total) by mouth once  daily.    Mixed hyperlipidemia  Comments:  ASCVD borderline 6.7.  Difficulty with diet since she goes to adult  and can be a picky eater. Will recheck FLP, considering starting statin    Pre-diabetes  Comments:  A1c 5.8.  Decreased soft drink intake. Will check annually.    Cyanocobalamin deficiency  Comments:  B12 189.  Continue B12 1000 mcg supplement  Orders:  -     cyanocobalamin (VITAMIN B-12) 1000 MCG tablet; Take 1 tablet (1,000 mcg total) by mouth once daily.    Normocytic anemia  Comments:  B12 deficiency as above. UTD on pelvic exam, MMG.  Has referral for C scope.    Other orders  -     Cancel: Lipid panel; Future         Notification of Lab Results: Phone Call  Astrid Yang, brother.  [phone]    Side effects of medication(s) were discussed in detail and patient voiced understanding.  Patient will call back for any issues or complications.     RTC in 3 month(s) or sooner PRN for HTN.

## 2019-05-29 ENCOUNTER — OFFICE VISIT (OUTPATIENT)
Dept: INTERNAL MEDICINE | Facility: CLINIC | Age: 59
End: 2019-05-29
Payer: MEDICARE

## 2019-05-29 VITALS
HEART RATE: 87 BPM | WEIGHT: 173.31 LBS | HEIGHT: 65 IN | SYSTOLIC BLOOD PRESSURE: 118 MMHG | DIASTOLIC BLOOD PRESSURE: 66 MMHG | OXYGEN SATURATION: 100 % | BODY MASS INDEX: 28.87 KG/M2

## 2019-05-29 DIAGNOSIS — E53.8 CYANOCOBALAMIN DEFICIENCY: ICD-10-CM

## 2019-05-29 DIAGNOSIS — F33.0 MILD EPISODE OF RECURRENT MAJOR DEPRESSIVE DISORDER: ICD-10-CM

## 2019-05-29 DIAGNOSIS — R73.03 PRE-DIABETES: ICD-10-CM

## 2019-05-29 DIAGNOSIS — R39.81 FUNCTIONAL INCONTINENCE: Primary | ICD-10-CM

## 2019-05-29 DIAGNOSIS — D64.9 NORMOCYTIC ANEMIA: ICD-10-CM

## 2019-05-29 DIAGNOSIS — J30.89 SEASONAL ALLERGIC RHINITIS DUE TO OTHER ALLERGIC TRIGGER: ICD-10-CM

## 2019-05-29 DIAGNOSIS — B37.2 CANDIDIASIS OF SKIN: ICD-10-CM

## 2019-05-29 DIAGNOSIS — F79 MENTAL RETARDATION: ICD-10-CM

## 2019-05-29 DIAGNOSIS — I10 ESSENTIAL HYPERTENSION: ICD-10-CM

## 2019-05-29 DIAGNOSIS — E78.2 MIXED HYPERLIPIDEMIA: ICD-10-CM

## 2019-05-29 PROBLEM — J30.2 SEASONAL ALLERGIC RHINITIS: Status: ACTIVE | Noted: 2019-05-29

## 2019-05-29 LAB
BILIRUB SERPL-MCNC: NORMAL MG/DL
BLOOD URINE, POC: NORMAL
COLOR, POC UA: YELLOW
GLUCOSE UR QL STRIP: NORMAL
KETONES UR QL STRIP: NORMAL
LEUKOCYTE ESTERASE URINE, POC: NORMAL
MICROSCOPIC COMMENT: NORMAL
NITRITE, POC UA: NORMAL
PH, POC UA: 6
PROTEIN, POC: NORMAL
RBC #/AREA URNS AUTO: 0 /HPF (ref 0–4)
SPECIFIC GRAVITY, POC UA: 1.01
UROBILINOGEN, POC UA: NORMAL
WBC #/AREA URNS AUTO: 1 /HPF (ref 0–5)

## 2019-05-29 PROCEDURE — 99999 PR PBB SHADOW E&M-EST. PATIENT-LVL III: ICD-10-PCS | Mod: PBBFAC,,, | Performed by: INTERNAL MEDICINE

## 2019-05-29 PROCEDURE — 99215 OFFICE O/P EST HI 40 MIN: CPT | Mod: S$PBB,25,, | Performed by: INTERNAL MEDICINE

## 2019-05-29 PROCEDURE — 99215 PR OFFICE/OUTPT VISIT, EST, LEVL V, 40-54 MIN: ICD-10-PCS | Mod: S$PBB,25,, | Performed by: INTERNAL MEDICINE

## 2019-05-29 PROCEDURE — 81001 URINALYSIS AUTO W/SCOPE: CPT | Mod: PBBFAC | Performed by: INTERNAL MEDICINE

## 2019-05-29 PROCEDURE — 87086 URINE CULTURE/COLONY COUNT: CPT

## 2019-05-29 PROCEDURE — 99999 PR PBB SHADOW E&M-EST. PATIENT-LVL III: CPT | Mod: PBBFAC,,, | Performed by: INTERNAL MEDICINE

## 2019-05-29 PROCEDURE — 99213 OFFICE O/P EST LOW 20 MIN: CPT | Mod: PBBFAC | Performed by: INTERNAL MEDICINE

## 2019-05-29 PROCEDURE — 81001 URINALYSIS AUTO W/SCOPE: CPT

## 2019-05-29 RX ORDER — FLUOXETINE HYDROCHLORIDE 40 MG/1
40 CAPSULE ORAL DAILY
Qty: 90 CAPSULE | Refills: 1 | Status: SHIPPED | OUTPATIENT
Start: 2019-05-29 | End: 2019-09-03 | Stop reason: DRUGHIGH

## 2019-05-29 RX ORDER — ZIPRASIDONE HYDROCHLORIDE 40 MG/1
40 CAPSULE ORAL DAILY
Qty: 90 CAPSULE | Refills: 1 | Status: SHIPPED | OUTPATIENT
Start: 2019-05-29 | End: 2019-09-03 | Stop reason: DRUGHIGH

## 2019-05-29 RX ORDER — AMLODIPINE BESYLATE 10 MG/1
10 TABLET ORAL DAILY
Qty: 90 TABLET | Refills: 0 | Status: SHIPPED | OUTPATIENT
Start: 2019-05-29 | End: 2019-05-29 | Stop reason: SDUPTHER

## 2019-05-29 RX ORDER — ZIPRASIDONE HYDROCHLORIDE 40 MG/1
40 CAPSULE ORAL DAILY
Qty: 90 CAPSULE | Refills: 1 | Status: SHIPPED | OUTPATIENT
Start: 2019-05-29 | End: 2019-05-29

## 2019-05-29 RX ORDER — AMLODIPINE BESYLATE 10 MG/1
10 TABLET ORAL DAILY
Qty: 90 TABLET | Refills: 1 | Status: SHIPPED | OUTPATIENT
Start: 2019-05-29 | End: 2019-09-13

## 2019-05-29 RX ORDER — LANOLIN ALCOHOL/MO/W.PET/CERES
1000 CREAM (GRAM) TOPICAL DAILY
Qty: 90 TABLET | Refills: 1 | Status: SHIPPED | OUTPATIENT
Start: 2019-05-29 | End: 2020-12-24 | Stop reason: SDUPTHER

## 2019-05-29 RX ORDER — NYSTATIN 100000 [USP'U]/G
POWDER TOPICAL 2 TIMES DAILY
Qty: 30 G | Refills: 1 | Status: SHIPPED | OUTPATIENT
Start: 2019-05-29 | End: 2019-06-26

## 2019-05-29 NOTE — PATIENT INSTRUCTIONS
1)Antihistamines(Allegra, Claritin, Xzyal, Zyrtec)  2)Nasal Steroids (Nasocort, Rhinocort, Flonase)  3)Distilled salt water sinus rinses via neti pots or products such as Francisco J Med Sinus Rinse or Sinugator. Must wash container or device and use bottled water to avoid introducing infection.   You can can use (as directed) any combination of these three things every day of your life if needed in order to treat or control your symptoms. Brand name use of medications is not necessary    You may have a yeast infection.  It occurs commonly in moist areas within skin folds.  Please try to keep the area clean and dry with a mild cleanser and towel drying the area.  You can also us a hair dryer on a cool setting.  Try to wear absorbent clothing in the meantime, like cotton.  I will also prescribe an antifungal powder that you can apply to the area twice a day.      If blood pressure consistently over 130/90, please make earlier appointment.

## 2019-05-31 ENCOUNTER — TELEPHONE (OUTPATIENT)
Dept: INTERNAL MEDICINE | Facility: CLINIC | Age: 59
End: 2019-05-31

## 2019-05-31 LAB — BACTERIA UR CULT: NO GROWTH

## 2019-05-31 NOTE — TELEPHONE ENCOUNTER
----- Message from Lori Weir MD sent at 5/31/2019  8:36 AM CDT -----  Please call the patient's family:    Thankfully the urine culture did not show signs of a bacterial infection in the bladder (UTI).  Please try taking Ms. Yang to the restroom regularly, such as every hour to prevent accidents.

## 2019-06-26 ENCOUNTER — OFFICE VISIT (OUTPATIENT)
Dept: INTERNAL MEDICINE | Facility: CLINIC | Age: 59
End: 2019-06-26
Payer: MEDICARE

## 2019-06-26 VITALS
RESPIRATION RATE: 12 BRPM | SYSTOLIC BLOOD PRESSURE: 136 MMHG | DIASTOLIC BLOOD PRESSURE: 78 MMHG | WEIGHT: 169.75 LBS | HEART RATE: 128 BPM | BODY MASS INDEX: 28.25 KG/M2 | OXYGEN SATURATION: 95 %

## 2019-06-26 DIAGNOSIS — F79 MENTAL RETARDATION: ICD-10-CM

## 2019-06-26 DIAGNOSIS — R73.03 PRE-DIABETES: ICD-10-CM

## 2019-06-26 DIAGNOSIS — E53.8 CYANOCOBALAMIN DEFICIENCY: ICD-10-CM

## 2019-06-26 DIAGNOSIS — E78.2 MIXED HYPERLIPIDEMIA: ICD-10-CM

## 2019-06-26 DIAGNOSIS — R39.81 FUNCTIONAL INCONTINENCE: ICD-10-CM

## 2019-06-26 DIAGNOSIS — F33.0 MILD EPISODE OF RECURRENT MAJOR DEPRESSIVE DISORDER: ICD-10-CM

## 2019-06-26 DIAGNOSIS — D64.9 NORMOCYTIC ANEMIA: ICD-10-CM

## 2019-06-26 DIAGNOSIS — I10 ESSENTIAL HYPERTENSION: ICD-10-CM

## 2019-06-26 DIAGNOSIS — L30.4 INTERTRIGO: Primary | ICD-10-CM

## 2019-06-26 PROBLEM — R21 RASH: Status: ACTIVE | Noted: 2019-06-26

## 2019-06-26 PROCEDURE — 99215 OFFICE O/P EST HI 40 MIN: CPT | Mod: S$PBB,,, | Performed by: INTERNAL MEDICINE

## 2019-06-26 PROCEDURE — 99999 PR PBB SHADOW E&M-EST. PATIENT-LVL IV: ICD-10-PCS | Mod: PBBFAC,,, | Performed by: INTERNAL MEDICINE

## 2019-06-26 PROCEDURE — 99215 PR OFFICE/OUTPT VISIT, EST, LEVL V, 40-54 MIN: ICD-10-PCS | Mod: S$PBB,,, | Performed by: INTERNAL MEDICINE

## 2019-06-26 PROCEDURE — 99214 OFFICE O/P EST MOD 30 MIN: CPT | Mod: PBBFAC | Performed by: INTERNAL MEDICINE

## 2019-06-26 PROCEDURE — 99999 PR PBB SHADOW E&M-EST. PATIENT-LVL IV: CPT | Mod: PBBFAC,,, | Performed by: INTERNAL MEDICINE

## 2019-06-26 RX ORDER — KETOCONAZOLE 20 MG/G
CREAM TOPICAL DAILY
Qty: 30 G | Refills: 1 | Status: SHIPPED | OUTPATIENT
Start: 2019-06-26 | End: 2019-08-29 | Stop reason: SDUPTHER

## 2019-06-26 NOTE — PATIENT INSTRUCTIONS
It occurs commonly in moist areas within skin folds.  Please try to keep the area clean and dry with a mild cleanser and towel drying the area.  You can also us a hair dryer on a cool setting.  Try to wear absorbent clothing in the meantime, like cotton.      Can apply barrier cream (Jazlyn's Butt Paste) in areas that may come in contact with urine or feces.

## 2019-06-28 ENCOUNTER — PATIENT OUTREACH (OUTPATIENT)
Dept: ADMINISTRATIVE | Facility: OTHER | Age: 59
End: 2019-06-28

## 2019-07-01 ENCOUNTER — INITIAL CONSULT (OUTPATIENT)
Dept: UROGYNECOLOGY | Facility: CLINIC | Age: 59
End: 2019-07-01
Payer: MEDICARE

## 2019-07-01 VITALS
SYSTOLIC BLOOD PRESSURE: 122 MMHG | BODY MASS INDEX: 28.76 KG/M2 | DIASTOLIC BLOOD PRESSURE: 70 MMHG | HEIGHT: 65 IN | WEIGHT: 172.63 LBS

## 2019-07-01 DIAGNOSIS — N39.41 URGE INCONTINENCE OF URINE: ICD-10-CM

## 2019-07-01 DIAGNOSIS — B37.31 YEAST VAGINITIS: Primary | ICD-10-CM

## 2019-07-01 PROCEDURE — 99999 PR PBB SHADOW E&M-EST. PATIENT-LVL III: ICD-10-PCS | Mod: PBBFAC,,, | Performed by: OBSTETRICS & GYNECOLOGY

## 2019-07-01 PROCEDURE — 99213 OFFICE O/P EST LOW 20 MIN: CPT | Mod: PBBFAC | Performed by: OBSTETRICS & GYNECOLOGY

## 2019-07-01 PROCEDURE — 99999 PR PBB SHADOW E&M-EST. PATIENT-LVL III: CPT | Mod: PBBFAC,,, | Performed by: OBSTETRICS & GYNECOLOGY

## 2019-07-01 PROCEDURE — 99213 PR OFFICE/OUTPT VISIT, EST, LEVL III, 20-29 MIN: ICD-10-PCS | Mod: S$PBB,,, | Performed by: OBSTETRICS & GYNECOLOGY

## 2019-07-01 PROCEDURE — 99213 OFFICE O/P EST LOW 20 MIN: CPT | Mod: S$PBB,,, | Performed by: OBSTETRICS & GYNECOLOGY

## 2019-07-01 RX ORDER — OXYBUTYNIN CHLORIDE 5 MG/1
TABLET, EXTENDED RELEASE ORAL
Qty: 63 TABLET | Refills: 0 | Status: SHIPPED | OUTPATIENT
Start: 2019-07-01 | End: 2019-07-02 | Stop reason: SDUPTHER

## 2019-07-01 RX ORDER — FLUCONAZOLE 150 MG/1
150 TABLET ORAL
Qty: 1 TABLET | Refills: 0 | Status: SHIPPED | OUTPATIENT
Start: 2019-07-01 | End: 2019-07-04

## 2019-07-01 NOTE — LETTER
July 1, 2019      Lori Weir MD  [address]  [address]  [facility] [address]           [facility]   [address] [address]  Phone: [phone]          Patient: Binta Yang   MR Number: [id number]   YOB: 1960   Date of Visit: 7/1/2019       Dear Dr. Lori Weir:    Thank you for referring Binta Yang to me for evaluation. Attached you will find relevant portions of my assessment and plan of care.    If you have questions, please do not hesitate to call me. I look forward to following Binta Yang along with you.    Sincerely,    Ubaldo Hobbs MD    Enclosure  CC:  No Recipients    If you would like to receive this communication electronically, please contact [email or web address] or [phone] to request more information on [employer] Link access.    For providers and/or their staff who would like to refer a patient to [facility], please contact us through our one-stop-shop provider referral line, [facility], at [phone].    If you feel you have received this communication in error or would no longer like to receive these types of communications, please e-mail [email or web address]

## 2019-07-01 NOTE — PROGRESS NOTES
"Subjective:      Patient ID: Binta Yang is a 58 y.o. female.    Chief Complaint:  Urinary Incontinence and vaginal rash      History of Present Illness  4-year-old para 2 with absolutely no issues of incontinence until 2 weeks ago an outpatient caregivers are stating she is coming home from her Center with soiled undergarments multiple times.  They are very concerned.  Patient states that this agrees with this observation patient not sexually active at this          Past Medical History:   Diagnosis Date    Cyanocobalamin deficiency     Depression     Essential hypertension     Hyperlipidemia     Mental retardation     Normocytic anemia     Pre-diabetes        Past Surgical History:   Procedure Laterality Date    CARDIAC SURGERY      "hole in the heart", during childhood    HYSTERECTOMY      difficulties with personal hygiene       GYN & OB History  No LMP recorded. Patient has had a hysterectomy.   Date of Last Pap: No result found    OB History    Para Term  AB Living   2 2 2         SAB TAB Ectopic Multiple Live Births                  # Outcome Date GA Lbr Jose/2nd Weight Sex Delivery Anes PTL Lv   2 Term            1 Term                Health Maintenance       Date Due Completion Date    Colonoscopy 1976 ---    Influenza Vaccine 2019 ---    Mammogram 12/10/2020 12/10/2018    Lipid Panel 2023    TETANUS VACCINE 2029          Family History   Problem Relation Age of Onset    Diabetes Mother     Asthma Mother     Hypertension Mother     Diabetes Father     Hypertension Father     Prostate cancer Brother     Diverticulitis Brother     Heart disease Daughter 35    Mental retardation Daughter     Congenital heart disease Daughter     Down syndrome Daughter     Mental retardation Son         mild, but functional    Breast cancer Neg Hx     Colon cancer Neg Hx     Ovarian cancer Neg Hx        Social History     Socioeconomic History " "   Marital status: Single     Spouse name: Not on file    Number of children: Not on file    Years of education: Not on file    Highest education level: Not on file   Occupational History    Not on file   Social Needs    Financial resource strain: Not on file    Food insecurity:     Worry: Not on file     Inability: Not on file    Transportation needs:     Medical: Not on file     Non-medical: Not on file   Tobacco Use    Smoking status: Never Smoker    Smokeless tobacco: Never Used   Substance and Sexual Activity    Alcohol use: No    Drug use: No    Sexual activity: Never   Lifestyle    Physical activity:     Days per week: Not on file     Minutes per session: Not on file    Stress: Not on file   Relationships    Social connections:     Talks on phone: Not on file     Gets together: Not on file     Attends [employer] service: Not on file     Active member of club or organization: Not on file     Attends meetings of clubs or organizations: Not on file     Relationship status: Not on file   Other Topics Concern    Not on file   Social History Narrative    Not on file       Review of Systems  Review of Systems   Genitourinary: Flank pain: Incontinence of stool and urine.          Objective:   /70 (BP Location: Left arm, Patient Position: Sitting)   Ht 5' 5" (1.651 m)   Wt 78.3 kg (172 lb 9.9 oz)   BMI 28.73 kg/m²     Physical Exam   Genitourinary: Pelvic exam was performed with patient supine. Skenes normal and bartholins normal. Right labia normal and left labia normal. Urethra exhibits no urethral caruncle, no urethral diverticulum and no urethral mass. No tenderness, rectocele, cystocele, unspecified prolapse of vaginal walls or atrophy in the vagina.   Empty cough stress test: Negative.  Kegel: 3/5     Obvious yeast  infection   Normal anatomy  Excellent tone of external sphincter no AARTI seen  Assessment:     1. Yeast vaginitis    2. Urge incontinence of urine            Plan:     1. " Yeast vaginitis    2. Urge incontinence of urine        After examination and I will I talked with the patient patient has a caregiver.  Diflucan x3 doses over 10 days each 172 hr apart.  A patient will be going patient caregiver to going to the home to see how the conditions put might have changed.  If there they are static and patient is being well taken care of we will move forward with oxybutynin titration.  Patient will then return to see me in 1 months time if needed.  Total time of this consultation is 30 min greater than 50% of time 20 min spent in direct discussion with the patient and patient caregiver    Patient Instructions

## 2019-07-02 RX ORDER — OXYBUTYNIN CHLORIDE 5 MG/1
TABLET, EXTENDED RELEASE ORAL
Qty: 202 TABLET | Refills: 3 | Status: SHIPPED | OUTPATIENT
Start: 2019-07-02 | End: 2019-08-08

## 2019-07-08 ENCOUNTER — TELEPHONE (OUTPATIENT)
Dept: UROGYNECOLOGY | Facility: CLINIC | Age: 59
End: 2019-07-08

## 2019-07-08 RX ORDER — FLUCONAZOLE 150 MG/1
150 TABLET ORAL DAILY
Qty: 3 TABLET | Refills: 0 | Status: SHIPPED | OUTPATIENT
Start: 2019-07-08 | End: 2019-07-11

## 2019-07-08 NOTE — TELEPHONE ENCOUNTER
----- Message from Priscilla Diaz sent at 7/8/2019 11:59 AM CDT -----  Contact: Elsie   Name of Who is Calling: Elsie     What is the request in detail:Elsie  is requesting a call back in regards to RXfluconazole (DIFLUCAN) 150 MG Tab, Elsie states medication quantity is incorrect...Please contact to further discuss and advise.    Can the clinic reply by [facility]: No     What [facility]  [phone]

## 2019-08-06 ENCOUNTER — PATIENT OUTREACH (OUTPATIENT)
Dept: ADMINISTRATIVE | Facility: OTHER | Age: 59
End: 2019-08-06

## 2019-08-08 ENCOUNTER — OFFICE VISIT (OUTPATIENT)
Dept: UROGYNECOLOGY | Facility: CLINIC | Age: 59
End: 2019-08-08
Payer: MEDICARE

## 2019-08-08 VITALS
WEIGHT: 165.81 LBS | BODY MASS INDEX: 27.59 KG/M2 | DIASTOLIC BLOOD PRESSURE: 62 MMHG | SYSTOLIC BLOOD PRESSURE: 130 MMHG

## 2019-08-08 DIAGNOSIS — N39.41 URGE INCONTINENCE OF URINE: Primary | ICD-10-CM

## 2019-08-08 DIAGNOSIS — B37.31 YEAST VAGINITIS: ICD-10-CM

## 2019-08-08 PROCEDURE — 99999 PR PBB SHADOW E&M-EST. PATIENT-LVL III: ICD-10-PCS | Mod: PBBFAC,,, | Performed by: OBSTETRICS & GYNECOLOGY

## 2019-08-08 PROCEDURE — 99213 PR OFFICE/OUTPT VISIT, EST, LEVL III, 20-29 MIN: ICD-10-PCS | Mod: S$PBB,,, | Performed by: OBSTETRICS & GYNECOLOGY

## 2019-08-08 PROCEDURE — 99213 OFFICE O/P EST LOW 20 MIN: CPT | Mod: PBBFAC | Performed by: OBSTETRICS & GYNECOLOGY

## 2019-08-08 PROCEDURE — 99999 PR PBB SHADOW E&M-EST. PATIENT-LVL III: CPT | Mod: PBBFAC,,, | Performed by: OBSTETRICS & GYNECOLOGY

## 2019-08-08 PROCEDURE — 99213 OFFICE O/P EST LOW 20 MIN: CPT | Mod: S$PBB,,, | Performed by: OBSTETRICS & GYNECOLOGY

## 2019-08-08 RX ORDER — OXYBUTYNIN CHLORIDE 10 MG/1
10 TABLET, EXTENDED RELEASE ORAL DAILY
Qty: 30 TABLET | Refills: 12 | Status: SHIPPED | OUTPATIENT
Start: 2019-08-08 | End: 2020-08-17 | Stop reason: SDUPTHER

## 2019-08-08 RX ORDER — FLUCONAZOLE 150 MG/1
150 TABLET ORAL DAILY
Qty: 1 TABLET | Refills: 0 | Status: SHIPPED | OUTPATIENT
Start: 2019-08-08 | End: 2019-08-09

## 2019-08-08 NOTE — PROGRESS NOTES
"Subjective:      Patient ID: Binta Yang is a 58 y.o. female.    Chief Complaint:  Urinary Incontinence (pts care giver states 15mg of Oxybutynin helped.)      History of Present Illness  Patient returns several weeks after consultation state caregiver states much less incontinence no discharge no complaints from patient          Past Medical History:   Diagnosis Date    Cyanocobalamin deficiency     Depression     Essential hypertension     Hyperlipidemia     Mental retardation     Normocytic anemia     Pre-diabetes        Past Surgical History:   Procedure Laterality Date    CARDIAC SURGERY      "hole in the heart", during childhood    HYSTERECTOMY      difficulties with personal hygiene       GYN & OB History  No LMP recorded. Patient has had a hysterectomy.   Date of Last Pap: No result found    OB History    Para Term  AB Living   2 2 2         SAB TAB Ectopic Multiple Live Births           2      # Outcome Date GA Lbr Jose/2nd Weight Sex Delivery Anes PTL Lv   2 Term            1 Term                Health Maintenance       Date Due Completion Date    Colonoscopy 1976 ---    Influenza Vaccine (1) 2019 10/1/2009    Mammogram 12/10/2020 12/10/2018    Lipid Panel 2023    TETANUS VACCINE 2029          Family History   Problem Relation Age of Onset    Diabetes Mother     Asthma Mother     Hypertension Mother     Diabetes Father     Hypertension Father     Prostate cancer Brother     Diverticulitis Brother     Heart disease Daughter 35    Mental retardation Daughter     Congenital heart disease Daughter     Down syndrome Daughter     Mental retardation Son         mild, but functional    Breast cancer Neg Hx     Colon cancer Neg Hx     Ovarian cancer Neg Hx        Social History     Socioeconomic History    Marital status: Single     Spouse name: Not on file    Number of children: Not on file    Years of education: Not on file "    Highest education level: Not on file   Occupational History    Not on file   Social Needs    Financial resource strain: Not on file    Food insecurity:     Worry: Not on file     Inability: Not on file    Transportation needs:     Medical: Not on file     Non-medical: Not on file   Tobacco Use    Smoking status: Never Smoker    Smokeless tobacco: Never Used   Substance and Sexual Activity    Alcohol use: No    Drug use: No    Sexual activity: Never   Lifestyle    Physical activity:     Days per week: Not on file     Minutes per session: Not on file    Stress: Not on file   Relationships    Social connections:     Talks on phone: Not on file     Gets together: Not on file     Attends Latter-day service: Not on file     Active member of club or organization: Not on file     Attends meetings of clubs or organizations: Not on file     Relationship status: Not on file   Other Topics Concern    Not on file   Social History Narrative    Not on file       Review of Systems  Review of Systems   Genitourinary: Urgency: Urge type incontinence very seldom but still happens.          Objective:   /62 (BP Location: Right arm, Patient Position: Sitting, BP Method: Large (Manual))   Wt 75.2 kg (165 lb 12.6 oz)   BMI 27.59 kg/m²     Physical Exam   Genitourinary: Pelvic exam was performed with patient supine. Vagina normal, skenes normal and bartholins normal. Right labia normal and left labia normal.    Some external yeast on lab via     Assessment:     1. Urge incontinence of urine    2. Yeast vaginitis            Plan:     1. Urge incontinence of urine    2. Yeast vaginitis      After examination and patient caregiver states that patient is safe in being well 10 to a the school that she goes to that she has indeed improved with doxepin in 5 there is room for greater improvement for the recent going to 10 elements of external the stone noted deal with the Diflucan patient and patient's caregiver will  follow up with me in 6 weeks total time 15 min greater than 50% time 10 min reviewing my findings and discussing medication  There are no Patient Instructions on file for this visit.    Expectant Management:  Patient understands we will continue to monitor her level of anatomic distortion without any type of active intervention until a time where symptomatology can no longer be tolerated by patient and she wishes to have active management.    Conservative Therapy:  Patient understands she will be utilizing a a supportive device within the vagina which will need maintenance.  The clinic will support her in her maintenance of the pessary.  Patient understands this is an active process which will need her input to maintain the pessary properly.     Surgical Management:

## 2019-08-15 ENCOUNTER — PATIENT OUTREACH (OUTPATIENT)
Dept: ADMINISTRATIVE | Facility: HOSPITAL | Age: 59
End: 2019-08-15

## 2019-08-15 DIAGNOSIS — Z12.11 COLON CANCER SCREENING: Primary | ICD-10-CM

## 2019-08-20 ENCOUNTER — DOCUMENTATION ONLY (OUTPATIENT)
Dept: INTERNAL MEDICINE | Facility: CLINIC | Age: 59
End: 2019-08-20

## 2019-08-20 NOTE — PROGRESS NOTES
Received records from [facility] regarding C scope for colon cancer screening.  This has been uploaded to the media section.  A summary of her records is as follows:    Saw GI for diarrhea. Plan on cscope for screening, abd U/S for abd sweling.    Screening C scope performed on 07/31/2019  -2 polyps in the distal sigmoid colon that was removed.  Biopsy pending.  -stage II internal hemorrhoids.  Recommend high-fiber diet.

## 2019-08-29 ENCOUNTER — OFFICE VISIT (OUTPATIENT)
Dept: INTERNAL MEDICINE | Facility: CLINIC | Age: 59
End: 2019-08-29
Payer: MEDICARE

## 2019-08-29 VITALS
HEIGHT: 65 IN | HEART RATE: 105 BPM | SYSTOLIC BLOOD PRESSURE: 132 MMHG | OXYGEN SATURATION: 98 % | WEIGHT: 160.5 LBS | BODY MASS INDEX: 26.74 KG/M2 | DIASTOLIC BLOOD PRESSURE: 80 MMHG

## 2019-08-29 DIAGNOSIS — D64.9 NORMOCYTIC ANEMIA: ICD-10-CM

## 2019-08-29 DIAGNOSIS — K80.80 BILIARY CALCULUS OF OTHER SITE WITHOUT OBSTRUCTION: ICD-10-CM

## 2019-08-29 DIAGNOSIS — L30.4 INTERTRIGO: Primary | ICD-10-CM

## 2019-08-29 DIAGNOSIS — K64.8 INTERNAL HEMORRHOIDS: ICD-10-CM

## 2019-08-29 DIAGNOSIS — J30.89 SEASONAL ALLERGIC RHINITIS DUE TO OTHER ALLERGIC TRIGGER: ICD-10-CM

## 2019-08-29 DIAGNOSIS — E78.2 MIXED HYPERLIPIDEMIA: ICD-10-CM

## 2019-08-29 DIAGNOSIS — K63.5 POLYP OF COLON, UNSPECIFIED PART OF COLON, UNSPECIFIED TYPE: ICD-10-CM

## 2019-08-29 DIAGNOSIS — R39.81 FUNCTIONAL INCONTINENCE: ICD-10-CM

## 2019-08-29 DIAGNOSIS — F33.0 MILD EPISODE OF RECURRENT MAJOR DEPRESSIVE DISORDER: ICD-10-CM

## 2019-08-29 DIAGNOSIS — I10 ESSENTIAL HYPERTENSION: ICD-10-CM

## 2019-08-29 DIAGNOSIS — R73.03 PRE-DIABETES: ICD-10-CM

## 2019-08-29 DIAGNOSIS — E53.8 CYANOCOBALAMIN DEFICIENCY: ICD-10-CM

## 2019-08-29 DIAGNOSIS — F79 MENTAL RETARDATION: ICD-10-CM

## 2019-08-29 PROBLEM — K80.20 CHOLELITHIASIS: Status: ACTIVE | Noted: 2019-08-29

## 2019-08-29 PROCEDURE — 99215 PR OFFICE/OUTPT VISIT, EST, LEVL V, 40-54 MIN: ICD-10-PCS | Mod: S$PBB,,, | Performed by: INTERNAL MEDICINE

## 2019-08-29 PROCEDURE — 99213 OFFICE O/P EST LOW 20 MIN: CPT | Mod: PBBFAC | Performed by: INTERNAL MEDICINE

## 2019-08-29 PROCEDURE — 99999 PR PBB SHADOW E&M-EST. PATIENT-LVL III: ICD-10-PCS | Mod: PBBFAC,,, | Performed by: INTERNAL MEDICINE

## 2019-08-29 PROCEDURE — 99215 OFFICE O/P EST HI 40 MIN: CPT | Mod: S$PBB,,, | Performed by: INTERNAL MEDICINE

## 2019-08-29 PROCEDURE — 99999 PR PBB SHADOW E&M-EST. PATIENT-LVL III: CPT | Mod: PBBFAC,,, | Performed by: INTERNAL MEDICINE

## 2019-08-29 RX ORDER — MINERAL OIL
180 ENEMA (ML) RECTAL DAILY
Qty: 30 TABLET | Refills: 11 | Status: SHIPPED | OUTPATIENT
Start: 2019-08-29 | End: 2020-12-24 | Stop reason: SDUPTHER

## 2019-08-29 RX ORDER — KETOCONAZOLE 20 MG/G
CREAM TOPICAL DAILY
Qty: 30 G | Refills: 3 | Status: SHIPPED | OUTPATIENT
Start: 2019-08-29 | End: 2021-07-22

## 2019-08-29 NOTE — PROGRESS NOTES
"Subjective:       Patient ID: Binta Yang is a 58 y.o. female who  has a past medical history of Cyanocobalamin deficiency, Depression, Essential hypertension, Hyperlipidemia, Mental retardation, Normocytic anemia, and Pre-diabetes.    Chief Complaint: Follow-up (HTN) and Rash    HPI    History was obtained from the patient and supplemented through chart review and from her sister-in-law (Irma) who accompanied the patient.    -seen Urogynecology for urge incontinence.  -Reviewed outside records from [facility] regarding cscope     Intertrigo:  +functional fecal and urinary incontinence Switched to a more gentler soap; stopped bubble baths.  No change in lotions, detergents.  Improved for a month with topical ketoconazole, treatment of incontinence as below, but recurrent.  + pruritus.  Treated with Diflucan for yeast vaginitis.    Functional fecal and urinary incontinence:    Her sister-in-law thinks that she waits until the last minute and therefore can't get to the restroom in time and is "lazy to get up".  No urinary sx, abdominal pain.  No change in mental status, confusion.  Requires a lot of encouragement to do ADLs.  Hard to do timed voiding at the adult day care.  UCx NG.  Has some improvement with oxybutynin.  Waxes and wanes.    HTN:    BP controlled on only Norvasc 10.  No longer on losartan 100.      Her brother and sister-in-law prepare meals.  She does drink soft drinks.  Eats deli meats her lunch at the adult day care.  She prefers to eat food with soft texture, such as mashed potatoes.    MR:    At baseline, she has a developmental delay/mild MR.  The patient's brother is her guardian.  She attends an adult  and lives with her brother and sister in law.  Her family prepares her meals.  She can bathe herself if her family members prepare the water.  She requires frequent reminders to shower and brush her teeth.  She can walk and transfer herself without difficulty. She has chronic facial " asymmetry and her sister-in-law states her mouth is usually twisted towards the right.  The patient is usually quiet.  She can nod her head and answer in one-word responses; has appropriate responses.    Depression:    Couldn't see [facility] psychiatrist Dr. Fam Urias anymore due to change in insurance.  Her daughter passed away 2-3 years ago who had Down syndrome and   Is now back on Geodon 40 qHS, Prozac 40.  Was on Wellbutrin instead of Geodon in the past.     Hyperlipidemia:    Total cholesterol 220.  Not on statin.  Diet as above.  Also eats fast food and quick meals sometimes.   Lab Results   Component Value Date    LDLCALC 134.8 12/21/2018     The 10-year ASCVD risk score (Marco HASKINS Jr., et al., 2013) is: 7%    Values used to calculate the score:      Age: 58 years      Sex: Female      Is Non- : Yes      Diabetic: No      Tobacco smoker: No      Systolic Blood Pressure: 132 mmHg      Is BP treated: Yes      HDL Cholesterol: 71 mg/dL      Total Cholesterol: 220 mg/dL    Pre diabetes:  Drinks soft drinks and fruit juice as above.  Trying to drink more water.  Lab Results   Component Value Date    HGBA1C 5.8 (H) 12/21/2018     Vitamin B12 deficiency and normocytic anemia:    B12 Low at 189.  On B12 1000 mcg a day.  Up-to-date on mammogram.  Colonoscopy with polyp.  OBGYN for pelvic exam q2 years.  History of hysterectomy for hygienic reasons and self-care.  Lab Results   Component Value Date    IRON 95 12/21/2018    TIBC 308 12/21/2018    FERRITIN 56 12/21/2018     Lab Results   Component Value Date    OGLSIKZY91 189 (L) 12/21/2018     AR: + rhinorrhea, breathing more loudly.  No postnasal drip.    Colon polyp:  C scope performed 07/31/2019.  Biopsy pending.  Will see [facility] today     Cholelithiasis:  Denies abdominal pain. TTP on exam with GI.  Has abd U/S with [facility] and appt today.    Internal hemorrhoids:  Recommend high-fiber diet.    Review of Systems   Constitutional:  "Negative for activity change and fever.   HENT: Positive for rhinorrhea. Negative for sneezing.    Eyes: Negative for redness and itching.   Respiratory: Negative for shortness of breath and wheezing.    Cardiovascular: Negative for chest pain and palpitations.   Gastrointestinal: Negative for abdominal pain and diarrhea.   Genitourinary: Negative for dysuria and hematuria.   Musculoskeletal: Negative for gait problem and joint swelling.   Skin: Positive for rash. Negative for color change.   Neurological: Negative for weakness and headaches.   Hematological: Negative for adenopathy.   Psychiatric/Behavioral: Negative for confusion and dysphoric mood.       I personally reviewed Past Medical History, Past Surgical History, Social History, and Family History.    Objective:      Vitals:    08/29/19 1037 08/29/19 1054   BP: (!) 140/80 132/80   Pulse: 105    SpO2: 98%    Weight: 72.8 kg (160 lb 7.9 oz)    Height: 5' 5" (1.651 m)       Physical Exam   Constitutional: She appears well-developed and well-nourished. No distress.   HENT:   Head: Normocephalic and atraumatic.   Nose: Mucosal edema present.   Mouth/Throat: Oropharynx is clear and moist.   Unable to fully view posterior OP d/t cooperation with exam   Eyes: Pupils are equal, round, and reactive to light. Conjunctivae and EOM are normal. Right eye exhibits no discharge. Left eye exhibits no discharge. No scleral icterus.   Neck: Neck supple. No tracheal deviation present. No thyromegaly present.   Cardiovascular: Normal rate, regular rhythm, normal heart sounds and intact distal pulses.   No murmur heard.  Pulmonary/Chest: Effort normal and breath sounds normal. No respiratory distress. She has no wheezes.   Abdominal: Soft. Bowel sounds are normal. She exhibits no distension. There is no tenderness.   Musculoskeletal: She exhibits no edema or deformity.   Lymphadenopathy:     She has no cervical adenopathy.   Neurological: She is alert. No cranial nerve deficit. "   Mouth twisted to the R, chronic  Follows simple commands   Skin: Skin is warm and dry. Capillary refill takes less than 2 seconds. Rash noted. She is not diaphoretic. No erythema.        Psychiatric: She has a normal mood and affect. Her behavior is normal.   Paucity of speech.  A few word answers. Nods appropriately         Lab Results   Component Value Date    WBC 4.90 04/12/2019    HGB 10.9 (L) 04/12/2019    HCT 34.6 (L) 04/12/2019     04/12/2019    CHOL 220 (H) 12/21/2018    TRIG 71 12/21/2018    HDL 71 12/21/2018    ALT 13 04/12/2019    AST 15 04/12/2019     04/12/2019    K 3.3 (L) 04/12/2019     04/12/2019    CREATININE 1.1 04/12/2019    BUN 14 04/12/2019    CO2 24 04/12/2019    HGBA1C 5.8 (H) 12/21/2018       The 10-year ASCVD risk score (Richmond Hillariel HASKINS Jr., et al., 2013) is: 7%    Values used to calculate the score:      Age: 58 years      Sex: Female      Is Non- : Yes      Diabetic: No      Tobacco smoker: No      Systolic Blood Pressure: 132 mmHg      Is BP treated: Yes      HDL Cholesterol: 71 mg/dL      Total Cholesterol: 220 mg/dL    (Imaging have been independently reviewed)  Mammogram without evidence of malignancy, BI-RADS 1.    Assessment:       1. Intertrigo    2. Functional incontinence    3. Essential hypertension    4. Mental retardation    5. Mild episode of recurrent major depressive disorder    6. Mixed hyperlipidemia    7. Pre-diabetes    8. Cyanocobalamin deficiency    9. Normocytic anemia    10. Seasonal allergic rhinitis due to other allergic trigger    11. Polyp of colon, unspecified part of colon, unspecified type    12. Biliary calculus of other site without obstruction    13. Internal hemorrhoids          Plan:       Binta was seen today for follow-up and rash.    Diagnoses and all orders for this visit:    Intertrigo  Comments:  Recurrent. Hygiene, incontinence contributing. A1C wnl . Keep area clean, dry. Rec breathable clothing, cotton.  Topical Ketoconazole. F/u Urogyn for IC.  Orders:  -     ketoconazole (NIZORAL) 2 % cream; Apply topically once daily. Use for minimum of 2 weeks    Functional incontinence  Comments:  Intermittent; on oxybutynin.  Following with Urogyn.    Essential hypertension  Comments:  At goal on only Norvasc 10, continue.    Mental retardation  Comments:  Needs encouragement and assistance to perform ADLs. Goes to adult .    Mild episode of recurrent major depressive disorder  Comments:  Cont Geodon 40, Prozac 40. Has appt with Psychiatry.    Mixed hyperlipidemia  Comments:  ASCVD borderline. Difficulty with diet since she goes to adult  and can be a picky eater. Ordered repeat FLP, consider starting statin.  Orders:  -     Lipid panel; Future    Pre-diabetes  Comments:  A1c 5.8.  Decreased soft drink intake. Hard to control diet at the adult . Will check annually.  Orders:  -     Hemoglobin A1c; Future    Cyanocobalamin deficiency  Comments:  B12 189.  Continue B12 1000 mcg supplement.  Recheck B12.  Orders:  -     Vitamin B12; Future    Normocytic anemia  Comments:  B12 deficiency as above. UTD on pelvic exam, MMG. C scope with polyp. Recheck iron panel.  Orders:  -     Ferritin; Future  -     Iron and TIBC; Future    Seasonal allergic rhinitis due to other allergic trigger  Comments:  likely unable to use Flonase correctly.  Recommend oral antihistamine.  Counseled on potential sedation.  Orders:  -     fexofenadine (ALLEGRA) 180 MG tablet; Take 1 tablet (180 mg total) by mouth once daily.    Polyp of colon, unspecified part of colon, unspecified type  Comments:  Biopsy pending.  Will see [facility] today.    Biliary calculus of other site without obstruction  Comments:  Following with [facility].    Internal hemorrhoids  Comments:  Recommended hydration.         Notification of Lab Results: Phone Call  kenny Hooker.  [phone]    Side effects of medication(s) were discussed in detail and  patient voiced understanding.  Patient will call back for any issues or complications.     RTC in 3 month(s) or sooner PRN for HTN with labs prior.

## 2019-09-03 ENCOUNTER — OFFICE VISIT (OUTPATIENT)
Dept: PSYCHIATRY | Facility: CLINIC | Age: 59
End: 2019-09-03
Payer: MEDICARE

## 2019-09-03 VITALS
HEART RATE: 91 BPM | WEIGHT: 164.81 LBS | BODY MASS INDEX: 27.46 KG/M2 | SYSTOLIC BLOOD PRESSURE: 135 MMHG | DIASTOLIC BLOOD PRESSURE: 62 MMHG | HEIGHT: 65 IN

## 2019-09-03 DIAGNOSIS — F79 MENTAL RETARDATION: Primary | ICD-10-CM

## 2019-09-03 DIAGNOSIS — F33.0 MILD EPISODE OF RECURRENT MAJOR DEPRESSIVE DISORDER: ICD-10-CM

## 2019-09-03 PROCEDURE — 90791 PR PSYCHIATRIC DIAGNOSTIC EVALUATION: ICD-10-PCS | Mod: ,,, | Performed by: PSYCHIATRY & NEUROLOGY

## 2019-09-03 PROCEDURE — 99212 OFFICE O/P EST SF 10 MIN: CPT | Mod: PBBFAC | Performed by: PSYCHIATRY & NEUROLOGY

## 2019-09-03 PROCEDURE — 99999 PR PBB SHADOW E&M-EST. PATIENT-LVL II: CPT | Mod: PBBFAC,,, | Performed by: PSYCHIATRY & NEUROLOGY

## 2019-09-03 PROCEDURE — 99999 PR PBB SHADOW E&M-EST. PATIENT-LVL II: ICD-10-PCS | Mod: PBBFAC,,, | Performed by: PSYCHIATRY & NEUROLOGY

## 2019-09-03 PROCEDURE — 90791 PSYCH DIAGNOSTIC EVALUATION: CPT | Mod: ,,, | Performed by: PSYCHIATRY & NEUROLOGY

## 2019-09-03 RX ORDER — FLUOXETINE HYDROCHLORIDE 20 MG/1
20 CAPSULE ORAL DAILY
Qty: 30 CAPSULE | Refills: 3 | Status: SHIPPED | OUTPATIENT
Start: 2019-09-03 | End: 2019-10-03

## 2019-09-03 RX ORDER — ZIPRASIDONE HYDROCHLORIDE 20 MG/1
20 CAPSULE ORAL DAILY
Qty: 30 CAPSULE | Refills: 3 | Status: SHIPPED | OUTPATIENT
Start: 2019-09-03 | End: 2019-11-21 | Stop reason: SDUPTHER

## 2019-09-03 NOTE — PROGRESS NOTES
Outpatient Psychiatry Initial Visit (MD/NP)    9/3/2019    Binta Yang, a 58 y.o. female, for initial evaluation visit.  Patient is referred by Lori Weir MD       Reason for Encounter: Referral for treatment    Complaints:  She has been experiencing delusions and depression. Ms Yang has a past history of mental retardation, also depression. Her depression has worsened the past several months since she ran out of the Prozac. She also lost a daughter within the past two years for whom she is grieving. These two factors have worsened her mood, manifested by loss of drive and enthusiasm and increased lethargy. She has not had behavioral issues. She was hospitalized after [facility] in [place] for paranoid thinking and has taken Geodon since that time which may contribute now to her lethargy. She also has twitching and mouth puckering which may partially also be due to the Geodon. She has no current signs or symptoms of psychosis. She does not answer most questions but is helped by her relatives who attended this visit. She goes to a day care center daily and was previously involved with [employer]. She is calm and cooperative at this time. She does not smoke or drink alcohol. Her only work was through her school at [employer]. She is well cared for by her family. She admits feeling sad at this time. She has never tried or intended to harm herself or others. She could not participate in a full mental status exam and much information came from her family.    Current Medications:  Medication List with Changes/Refills   New Medications    FLUOXETINE 20 MG CAPSULE    Take 1 capsule (20 mg total) by mouth once daily.    ZIPRASIDONE (GEODON) 20 MG CAP    Take 1 capsule (20 mg total) by mouth Daily.   Current Medications    AMLODIPINE (NORVASC) 10 MG TABLET    Take 1 tablet (10 mg total) by mouth once daily.    CYANOCOBALAMIN (VITAMIN B-12) 1000 MCG TABLET    Take 1 tablet (1,000 mcg  total) by mouth once daily.    FEXOFENADINE (ALLEGRA) 180 MG TABLET    Take 1 tablet (180 mg total) by mouth once daily.    KETOCONAZOLE (NIZORAL) 2 % CREAM    Apply topically once daily. Use for minimum of 2 weeks    OXYBUTYNIN (DITROPAN-XL) 10 MG 24 HR TABLET    Take 1 tablet (10 mg total) by mouth once daily.   Discontinued Medications    FLUOXETINE 40 MG CAPSULE    Take 1 capsule (40 mg total) by mouth once daily.    ZIPRASIDONE (GEODON) 40 MG CAP    Take 1 capsule (40 mg total) by mouth once daily.        Stressors:  Loss of daughter and possible side effects from medicines.    History:     Past Psychiatric History:   Previous therapy: yes  Previous psychiatric treatment and medication trials: yes  Previous psychiatric hospitalizations: yes  Only hosptialized once as described above.Previous diagnoses: yes  Previous suicide attempts: no  History of violence: no  Currently in treatment with myself.  Education: Through [employer]  Other pertinent history: None  Depression screening was performed with standardized tool: Not Screened - Not Eligible/Appropriate    Substance Abuse History:  Recreational drugs: No recreartional drug use  Use of alcohol: denied  Use of caffeine: denies use  Tobacco use: no  Legal consequences of chemical use: no  Patient feels she ought to cut down on drinking and/or drug use: no  Patient has been annoyed by others criticizing her drinking or drug use: no  Patient has felt bad or guilty about drinking or drug use:no  Patient has had a drink or used drugs as an eye opener first thing in the morning to steady nerves, get rid of a hangover or get the day started: no  Use of OTC medications: Not known.    The following portions of the patient's history were reviewed and updated as appropriate: allergies, current medications, past family history, past medical history, past social history, past surgical history and problem list.    Record Review: moderate      Review Of Systems:      Medical Review Of Systems:  ROS     Psychiatric Review Of Systems:  Sleep: no  Appetite changes: no  Weight changes: no  Energy: yes  Interest/pleasure/anhedonia: yes Not as able to participate or enjoy self since loss of her daughter.Somatic symptoms: no  Libido: no  Anxiety/panic: no  Guilty/hopeless: no  Self-injurious behavior/risky behavior: no  Any drugs: no  Alcohol: no     Current Evaluation:       Mental Status Evaluation:  Appearance:  unremarkable, age appropriate, well dressed, neatly groomed   Behavior:  normal, cooperative, psychomotor retardation   Speech:  no latency; no press, non-spontaneous   Mood:  depressed   Affect:  blunted, decreased range   Thought Process:  poverty of thought   Thought Content:  normal, no suicidality, no homicidality, delusions, or paranoia   Sensorium:  grossly intact, could not evaluate   Cognition:  impaired due to Long term history of developmental disability and mental impairment   Insight:  limited awareness of illness   Judgment:  impaired due to Inability to care for self     SIGECAPS  Sleep:   Interest:   Guilt:   Energy:   Concentration:   Appetite:   Psychomotor agitation:   Suicidal intentions: no  Suicidal plan: no    Physical/Somatic Complaints  The patient lists: no physical complaints.    Functioning in Relationships:  Spouse/partner:   Peers:   Employers:       Assessment - Diagnosis - Goals:       ICD-10-CM ICD-9-CM   1. Mental retardation F79 319   2. Mild episode of recurrent major depressive disorder F33.0 296.31       Treatment Goals:  Specify outcomes written in observable, behavioral terms:   Depression: acquiring relapse prevention skills, increasing motivation and reducing fatigue    Treatment Plan/Recommendations: No additional recommendations at this time.  Follow-up plan for depression was discussed with patient. Discussed with family who were present at this time. Plan to reduce Geodon to 20mg nightly from 40mg nightly, and restart Prozac  20mg q am. Side effects reviewed with family and patient, and advised they could call if patient had problems with her medicines or clinical condition. Agreed to another appointment in one month.    Review with patient: Treatment plan reviewed with the patient.  Medication risks/benefit reviewed with the patient    Monster Muñoz Jr

## 2019-09-18 ENCOUNTER — PATIENT OUTREACH (OUTPATIENT)
Dept: ADMINISTRATIVE | Facility: OTHER | Age: 59
End: 2019-09-18

## 2019-09-20 ENCOUNTER — OFFICE VISIT (OUTPATIENT)
Dept: UROGYNECOLOGY | Facility: CLINIC | Age: 59
End: 2019-09-20
Payer: MEDICARE

## 2019-09-20 VITALS
SYSTOLIC BLOOD PRESSURE: 124 MMHG | BODY MASS INDEX: 26.33 KG/M2 | WEIGHT: 158 LBS | HEIGHT: 65 IN | DIASTOLIC BLOOD PRESSURE: 78 MMHG

## 2019-09-20 DIAGNOSIS — N39.41 URGE INCONTINENCE OF URINE: ICD-10-CM

## 2019-09-20 DIAGNOSIS — N32.81 OAB (OVERACTIVE BLADDER): Primary | ICD-10-CM

## 2019-09-20 PROCEDURE — 99999 PR PBB SHADOW E&M-EST. PATIENT-LVL III: ICD-10-PCS | Mod: PBBFAC,,, | Performed by: OBSTETRICS & GYNECOLOGY

## 2019-09-20 PROCEDURE — 99213 OFFICE O/P EST LOW 20 MIN: CPT | Mod: S$PBB,,, | Performed by: OBSTETRICS & GYNECOLOGY

## 2019-09-20 PROCEDURE — 99213 OFFICE O/P EST LOW 20 MIN: CPT | Mod: PBBFAC | Performed by: OBSTETRICS & GYNECOLOGY

## 2019-09-20 PROCEDURE — 99213 PR OFFICE/OUTPT VISIT, EST, LEVL III, 20-29 MIN: ICD-10-PCS | Mod: S$PBB,,, | Performed by: OBSTETRICS & GYNECOLOGY

## 2019-09-20 PROCEDURE — 99999 PR PBB SHADOW E&M-EST. PATIENT-LVL III: CPT | Mod: PBBFAC,,, | Performed by: OBSTETRICS & GYNECOLOGY

## 2019-09-20 NOTE — PROGRESS NOTES
"Subjective:      Patient ID: Binta Yang is a 58 y.o. female.    Chief Complaint:  Follow-up; Urinary Frequency; and Urinary Urgency      History of Present Illness  The oxybutynin 15 had been working well.  But now of patient and patient carrier state that there is an increase in frequency.            Past Medical History:   Diagnosis Date    Cyanocobalamin deficiency     Depression     Essential hypertension     Hyperlipidemia     Mental retardation     Normocytic anemia     Pre-diabetes        Past Surgical History:   Procedure Laterality Date    CARDIAC SURGERY      "hole in the heart", during childhood    HYSTERECTOMY      difficulties with personal hygiene       GYN & OB History  No LMP recorded. Patient has had a hysterectomy.   Date of Last Pap: No result found    OB History    Para Term  AB Living   2 2 2         SAB TAB Ectopic Multiple Live Births           2      # Outcome Date GA Lbr Jose/2nd Weight Sex Delivery Anes PTL Lv   2 Term            1 Term                Health Maintenance       Date Due Completion Date    Influenza Vaccine (1) 2019 10/1/2009    Mammogram 12/10/2020 12/10/2018    Lipid Panel 2023    Colonoscopy 2024 (Done)    Override on 2019: Done    TETANUS VACCINE 2029          Family History   Problem Relation Age of Onset    Diabetes Mother     Asthma Mother     Hypertension Mother     Diabetes Father     Hypertension Father     Prostate cancer Brother     Diverticulitis Brother     Heart disease Daughter 35    Mental retardation Daughter     Congenital heart disease Daughter     Down syndrome Daughter     Mental retardation Son         mild, but functional    Breast cancer Neg Hx     Colon cancer Neg Hx     Ovarian cancer Neg Hx        Social History     Socioeconomic History    Marital status: Single     Spouse name: Not on file    Number of children: Not on file    Years of " "education: Not on file    Highest education level: Not on file   Occupational History    Not on file   Social Needs    Financial resource strain: Not on file    Food insecurity:     Worry: Not on file     Inability: Not on file    Transportation needs:     Medical: Not on file     Non-medical: Not on file   Tobacco Use    Smoking status: Never Smoker    Smokeless tobacco: Never Used   Substance and Sexual Activity    Alcohol use: No    Drug use: No    Sexual activity: Never   Lifestyle    Physical activity:     Days per week: Not on file     Minutes per session: Not on file    Stress: Not on file   Relationships    Social connections:     Talks on phone: Not on file     Gets together: Not on file     Attends [employer] service: Not on file     Active member of club or organization: Not on file     Attends meetings of clubs or organizations: Not on file     Relationship status: Not on file   Other Topics Concern    Not on file   Social History Narrative    Not on file       Review of Systems  Review of Systems   All other systems reviewed and are negative.         Objective:   /78 (BP Location: Right arm, Patient Position: Sitting, BP Method: Large (Manual))   Ht 5' 5" (1.651 m)   Wt 71.7 kg (158 lb)   BMI 26.29 kg/m²     Physical Exam   Deferred  Assessment:     1. OAB (overactive bladder)    2. Urge incontinence of urine            Plan:     1. OAB (overactive bladder)    2. Urge incontinence of urine      Long discussion of anticholinergic in relation to  to other medications    A my adding a beta 3 agonist patient's caregiver was concerned that medicine was being added I checked all medications there was no type of it is contraindicated interaction for that reason I feel safe doing a combined anticholinergic or beta 3 agonist.  Precautions given regarding the utilization of the medicine patient will check blood pressure for the next 10 days when she commence is utilization additionally if " there is efficacy I have asked patient and patient caregiver to really  The omental program see if decreasing the economic impact off the medicine if there is no efficacy then we will consider chemodenervation with Botox versus sacral neuromodulation.  Discussed both options should that be needed patient caregiver felt that those are very invasive and agrees with the utilization of combined therapy to assist with the refractory away be urge precautions given at great length patient knows to check blood pressure as stated patient return back and see me in 4 weeks.  Total time of this consultation 15 min 100% of a spent answering questions and discussing the combined approach as well as other options should that be needed  There are no Patient Instructions on file for this visit.

## 2019-10-02 ENCOUNTER — OFFICE VISIT (OUTPATIENT)
Dept: PSYCHIATRY | Facility: CLINIC | Age: 59
End: 2019-10-02
Payer: MEDICARE

## 2019-10-02 VITALS
HEART RATE: 106 BPM | WEIGHT: 161.81 LBS | SYSTOLIC BLOOD PRESSURE: 147 MMHG | BODY MASS INDEX: 26.93 KG/M2 | DIASTOLIC BLOOD PRESSURE: 64 MMHG

## 2019-10-02 DIAGNOSIS — F33.0 MILD EPISODE OF RECURRENT MAJOR DEPRESSIVE DISORDER: Primary | ICD-10-CM

## 2019-10-02 PROCEDURE — 99212 OFFICE O/P EST SF 10 MIN: CPT | Mod: PBBFAC | Performed by: PSYCHIATRY & NEUROLOGY

## 2019-10-02 PROCEDURE — 99214 OFFICE O/P EST MOD 30 MIN: CPT | Mod: S$PBB,,, | Performed by: PSYCHIATRY & NEUROLOGY

## 2019-10-02 PROCEDURE — 99214 PR OFFICE/OUTPT VISIT, EST, LEVL IV, 30-39 MIN: ICD-10-PCS | Mod: S$PBB,,, | Performed by: PSYCHIATRY & NEUROLOGY

## 2019-10-02 PROCEDURE — 99999 PR PBB SHADOW E&M-EST. PATIENT-LVL II: ICD-10-PCS | Mod: PBBFAC,,, | Performed by: PSYCHIATRY & NEUROLOGY

## 2019-10-02 PROCEDURE — 99999 PR PBB SHADOW E&M-EST. PATIENT-LVL II: CPT | Mod: PBBFAC,,, | Performed by: PSYCHIATRY & NEUROLOGY

## 2019-10-02 NOTE — PROGRESS NOTES
Outpatient Psychiatry Follow-Up Visit (MD/NP)    10/02/2019    Clinical Status of Patient:  Outpatient (Ambulatory)    Chief Complaint:  Ms Ferrari is a 58 y.o. female who presents today for follow-up of depression.     Met with patient at the office in the company of her son.      Interval History and Content of Current Session:  General impression:Since starting back on the Prozac her mood is slightly improved,and she is enjoying her daily visits to the center for activities. She still has the lip;and mouth puckering, and she seems to have mild akathisia as well as excess lethargy which is improved due to the reduction in the Geodon. She is not having active hallucinations or delusions at this time and is very cooperative and in good self control. Her answers remain brief, but appropriate and she has no intent to harm herself or others. She is very cooperative and high functioning at home and the center.      Target symptoms:Reduction of excess lethargy and EPS, plus possible signs of TD. Maintaining mood stability as well.        Compliance:  Pt reports she is taking medications as directed    Side effects:  EPS and possible TD, but she has problems with her dentures as well.    Risk Parameters:  Patient reports no suicidal ideation  Patient reports no homicidal ideation  Patient reports no self-injurious behavior  Patient reports no violent behavior    Patient's Response to Intervention:  The patient's response to intervention is good and hopeful.    Progress Toward Goals and Other Mental Status Changes:  The patient's progress toward goals is fair.    Vitals: Most recent vital signs reviewed.     Mental Status Evaluation     Appearance:  Appropriate and well groomed   Behavior:  Good self control   Speech:  Brief and low toned   Mood:  Upbeat and hopeful   Affect:  inflexible   Thought Process:  orgainized   Thought Content:  appropriate to interview   Sensorium:  stable   Attention Span & Concentration good    Cognition:  Well focused and pertinent to interview   Insight:  fair   Judgment:  stable         Diagnosis:Depression        Plan:(Medication and Therapy Recommendation)  · Taper off the Geodon(given schedule), and continue Prozac 20mg daily. I reviewed the side effects of the meds and advised she and her family that they could call if there were problems with her medicines or her clinical condition. She agreed to another appointment in one month.    Additional Notes: Supportive psychotherapy provided during this session.    [facility]

## 2019-10-15 ENCOUNTER — OFFICE VISIT (OUTPATIENT)
Dept: PSYCHIATRY | Facility: CLINIC | Age: 59
End: 2019-10-15
Payer: MEDICARE

## 2019-10-15 DIAGNOSIS — F43.21 GRIEF: ICD-10-CM

## 2019-10-15 DIAGNOSIS — F33.0 MILD EPISODE OF RECURRENT MAJOR DEPRESSIVE DISORDER: Primary | ICD-10-CM

## 2019-10-15 PROCEDURE — 90791 PR PSYCHIATRIC DIAGNOSTIC EVALUATION: ICD-10-PCS | Mod: ,,, | Performed by: SOCIAL WORKER

## 2019-10-15 PROCEDURE — 90791 PSYCH DIAGNOSTIC EVALUATION: CPT | Mod: ,,, | Performed by: SOCIAL WORKER

## 2019-10-17 ENCOUNTER — PATIENT OUTREACH (OUTPATIENT)
Dept: ADMINISTRATIVE | Facility: OTHER | Age: 59
End: 2019-10-17

## 2019-10-17 NOTE — PROGRESS NOTES
Psychiatry Initial Visit (PhD/LCSW)  Diagnostic Interview - CPT 46573    Date: 10/15/2019    Site: [facility]    Referral source: Dr. Muñoz    Clinical status of patient: Outpatient    Binta Yang, a 58 y.o. female, for initial evaluation visit.  Met with patient and sister in law.    Chief complaint/reason for encounter: depression and interpersonal    History of present illness: Pt presented on time to scheduled initial session which lasted for 60 minutes. Session focused on building rapport, establishing a therapeutic relationship and history of mental health treatment. Pt is joined in session by her long-term caregiver, her sister-in-law. Pt is primarily non-verbal throughout session. All content was provided by sister-in-law who has been caregiver to pt for roughly 23 years. Pt has depressed mood, low motivation, has not wanted to go out socially. Decreased ADLs- lost weight from not eating, dehydrated, not showering or taking care of self. Pt has several doctors appointments scheduled for incontinence. She will stay in bed or couch watching tv and not get up. No reported hospitalizations for mental health- however, sister in law notes its never been this bad before. Pt replied in one word yes/no answers when prompted. Sister in law states that she is able to speak, but has not been unless directly forced to. There are no current signs of hallucinations, delusions, bizarre behaviors or other indicators of psychotic process. She is cooperative throughout session.     Pain: noncontributory    Symptoms:   · Mood: depressed mood, diminished interest, weight loss, insomnia, psychomotor retardation, fatigue and social isolation  · Anxiety: denied  · Substance abuse: denied  · Cognitive functioning: impaired due to long term disability  · Health behaviors: noncontributory    Psychiatric history: currently under psychiatric care    Medical history: noncontributory    Family history of psychiatric  illness: not known    Social history (marriage, employment, etc.): Pt currently lives with her brother and sister in law. Pt is one of 7 children, she has 4 brothers and 2 sisters. Her mother was a  who cared for her until she passed away 30 years ago. Since then, pt's brother and sister in law are primary caretakers. Pt currently attends day-program at [facility]. She enjoys coloring, crossword puzzles, and getting her nails done. Pt has one son, 38 years old. Pt's daughter  two years ago from heart attack. Symptoms of depression increased with grief and loss of daughter. Pt has large, close family and several supports.     Substance use:   Alcohol: none   Drugs: none   Tobacco: none   Caffeine: Drinks multiple soft drinks throughout the day, has to be forced to drink water.     Current medications and drug reactions (include OTC, herbal): see medication list. Pt's daughter-in-law reports seeing no changes in mood or attitude since starting medication. Urged them to follow up with psychiatrist with medication management concerns.     Strengths and liabilities: Strength: Patient accepts guidance/feedback, Strength: Patient has positive support network., Strength: Patient is stable., Liability: Patient is dependent., Liability: Patient has intellectual deficits., Liability: Patient lacks social skills., Liability: Patient lacks coping skills.    Current Evaluation:     Mental Status Exam:  General Appearance:  unremarkable, age appropriate, casually dressed, neatly groomed, overweight   Speech: delayed, increased latency of response, soft, non-spontaneous      Level of Cooperation: cooperative      Thought Processes: Unable to assess   Mood: euthymic      Thought Content: normal, no suicidality, no homicidality, delusions, or paranoia   Affect: restricted   Orientation: Oriented x3   Memory: recent >  intact   Attention Span & Concentration: intact   Fund of General Knowledge: intact and  appropriate to age and level of education   Abstract Reasoning: Unable to assess   Judgment & Insight: limited     Language  Limited     Diagnostic Impression - Plan:       ICD-10-CM ICD-9-CM   1. Mild episode of recurrent major depressive disorder F33.0 296.31   2. Grief F43.21 309.0       Plan:individual psychotherapy, family psychotherapy and consult psychiatrist for medication evaluation    Return to Clinic: as scheduled    Length of Service (minutes): 60

## 2019-10-21 ENCOUNTER — OFFICE VISIT (OUTPATIENT)
Dept: UROGYNECOLOGY | Facility: CLINIC | Age: 59
End: 2019-10-21
Payer: MEDICARE

## 2019-10-21 ENCOUNTER — TELEPHONE (OUTPATIENT)
Dept: UROGYNECOLOGY | Facility: CLINIC | Age: 59
End: 2019-10-21

## 2019-10-21 VITALS
HEIGHT: 65 IN | DIASTOLIC BLOOD PRESSURE: 68 MMHG | BODY MASS INDEX: 27.16 KG/M2 | WEIGHT: 163 LBS | SYSTOLIC BLOOD PRESSURE: 130 MMHG

## 2019-10-21 DIAGNOSIS — R39.15 URINARY URGENCY: Primary | ICD-10-CM

## 2019-10-21 DIAGNOSIS — N39.41 URGE INCONTINENCE OF URINE: ICD-10-CM

## 2019-10-21 DIAGNOSIS — N32.81 OAB (OVERACTIVE BLADDER): ICD-10-CM

## 2019-10-21 PROCEDURE — 99212 OFFICE O/P EST SF 10 MIN: CPT | Mod: PBBFAC | Performed by: OBSTETRICS & GYNECOLOGY

## 2019-10-21 PROCEDURE — 99999 PR PBB SHADOW E&M-EST. PATIENT-LVL II: ICD-10-PCS | Mod: PBBFAC,,, | Performed by: OBSTETRICS & GYNECOLOGY

## 2019-10-21 PROCEDURE — 99213 PR OFFICE/OUTPT VISIT, EST, LEVL III, 20-29 MIN: ICD-10-PCS | Mod: S$PBB,,, | Performed by: OBSTETRICS & GYNECOLOGY

## 2019-10-21 PROCEDURE — 99213 OFFICE O/P EST LOW 20 MIN: CPT | Mod: S$PBB,,, | Performed by: OBSTETRICS & GYNECOLOGY

## 2019-10-21 PROCEDURE — 99999 PR PBB SHADOW E&M-EST. PATIENT-LVL II: CPT | Mod: PBBFAC,,, | Performed by: OBSTETRICS & GYNECOLOGY

## 2019-10-21 NOTE — PROGRESS NOTES
"Subjective:      Patient ID: Binta Yang is a 58 y.o. female.    Chief Complaint:  Follow-up and Urinary Urgency      History of Present Illness  Patient returns.  There is absolutely no improvement with the Myrbetriq.  There was also no complaint with no improvement with the combination.  Patient caregiver states that there is the there was a trip to and get a colonoscopy but I do not see the records in their as they wished for me to review          Past Medical History:   Diagnosis Date    Cyanocobalamin deficiency     Depression     Essential hypertension     Hyperlipidemia     Mental retardation     Normocytic anemia     Pre-diabetes        Past Surgical History:   Procedure Laterality Date    CARDIAC SURGERY      "hole in the heart", during childhood    HYSTERECTOMY      difficulties with personal hygiene       GYN & OB History  No LMP recorded. Patient has had a hysterectomy.   Date of Last Pap: No result found    OB History    Para Term  AB Living   2 2 2         SAB TAB Ectopic Multiple Live Births           2      # Outcome Date GA Lbr Jose/2nd Weight Sex Delivery Anes PTL Lv   2 Term            1 Term                Health Maintenance       Date Due Completion Date    Influenza Vaccine (1) 2019 10/1/2009    Mammogram 12/10/2020 12/10/2018    Lipid Panel 2023    Colonoscopy 2024 (Done)    Override on 2019: Done    TETANUS VACCINE 2029          Family History   Problem Relation Age of Onset    Diabetes Mother     Asthma Mother     Hypertension Mother     Diabetes Father     Hypertension Father     Prostate cancer Brother     Diverticulitis Brother     Heart disease Daughter 35    Mental retardation Daughter     Congenital heart disease Daughter     Down syndrome Daughter     Mental retardation Son         mild, but functional    Breast cancer Neg Hx     Colon cancer Neg Hx     Ovarian cancer Neg Hx  " "      Social History     Socioeconomic History    Marital status: Single     Spouse name: Not on file    Number of children: Not on file    Years of education: Not on file    Highest education level: Not on file   Occupational History    Not on file   Social Needs    Financial resource strain: Not on file    Food insecurity:     Worry: Not on file     Inability: Not on file    Transportation needs:     Medical: Not on file     Non-medical: Not on file   Tobacco Use    Smoking status: Never Smoker    Smokeless tobacco: Never Used   Substance and Sexual Activity    Alcohol use: No    Drug use: No    Sexual activity: Never   Lifestyle    Physical activity:     Days per week: Not on file     Minutes per session: Not on file    Stress: Not on file   Relationships    Social connections:     Talks on phone: Not on file     Gets together: Not on file     Attends Latter-day service: Not on file     Active member of club or organization: Not on file     Attends meetings of clubs or organizations: Not on file     Relationship status: Not on file   Other Topics Concern    Not on file   Social History Narrative    Not on file       Review of Systems  Review of Systems   Genitourinary: Positive for frequency and urgency.          Objective:   /68 (BP Location: Right arm, Patient Position: Sitting, BP Method: Large (Manual))   Ht 5' 5" (1.651 m)   Wt 73.9 kg (163 lb)   BMI 27.12 kg/m²     Physical Exam   Deferred  Assessment:     1. Urinary urgency    2. OAB (overactive bladder)    3. Urge incontinence of urine            Plan:     1. Urinary urgency    2. OAB (overactive bladder)    3. Urge incontinence of urine      Refractory to all medical management at this point.  I do not know if urodynamics and cystoscopy will be of assistance in this particular patient.  For that reason I do not know if so the chemodenervation with Botox as the proper direction as well with the chance of increased UTI as well as " urinary retention.  I am suggesting that a possible straight two-stage test with sacral neuromodulation might be in order I have talked to them at length.  Patient patient and patient's caregiver will take it under advisement and let us know in what direction they wish to opiate move forward.  Total time of this visit 15 min 100% of in direct discussion with the patient answering questions and providing information as best as I can.  There are no Patient Instructions on file for this visit.

## 2019-11-21 DIAGNOSIS — F33.0 MILD EPISODE OF RECURRENT MAJOR DEPRESSIVE DISORDER: ICD-10-CM

## 2019-11-21 RX ORDER — ZIPRASIDONE HYDROCHLORIDE 20 MG/1
CAPSULE ORAL
Qty: 90 CAPSULE | Refills: 0 | Status: SHIPPED | OUTPATIENT
Start: 2019-11-21 | End: 2020-02-13

## 2019-11-21 RX ORDER — FLUOXETINE HYDROCHLORIDE 40 MG/1
CAPSULE ORAL
Qty: 90 CAPSULE | Refills: 0 | OUTPATIENT
Start: 2019-11-21

## 2020-01-24 ENCOUNTER — TELEPHONE (OUTPATIENT)
Dept: INTERNAL MEDICINE | Facility: CLINIC | Age: 60
End: 2020-01-24

## 2020-01-24 NOTE — TELEPHONE ENCOUNTER
Spoke with pt daughter in regards to 90L form needing to be filled out. Daughter stated pt appt is not till 2/13/2020 and by that time pt would be dropped from program. Daughter thought she could just come and drop papers off to office but was told  doesn't sign papers without seeing pt. Ask daughter could papers be filled out by another provider she stated no. Daughter would like to know if pt provider would sign papers without seeing pt. Message routed to provider.

## 2020-01-24 NOTE — TELEPHONE ENCOUNTER
----- Message from Bc Strickland sent at 1/24/2020  8:40 AM CST -----  Contact: Melinda/caregiver  Pt is scheduled for an appt in Feb but would like a sooner appt to have paperwork completed.    Please contact Melinda at [phone].    Thanks

## 2020-01-24 NOTE — TELEPHONE ENCOUNTER
----- Message from Salvador Velazquez sent at 1/24/2020  9:56 AM CST -----  Contact: Pt   Type:  Patient Returning Call    Who Called: NOVA MARCUS [[id number]]    Who Left Message for Patient: Estella     Does the patient know what this is regarding?:Yes     Best Call Back Number:[phone] (home) [phone] (work)    Additional Information:

## 2020-01-24 NOTE — TELEPHONE ENCOUNTER
"Spoke with pt daughter and informed her that  would like for her to know " I know her well, so it's ok to bring in the forms without being seen.  I'll review it and we can make an earlier appointment if we need additional information."  Daughter verbalized understanding and stated she will bring papers in on Monday   "

## 2020-01-29 ENCOUNTER — DOCUMENTATION ONLY (OUTPATIENT)
Dept: INTERNAL MEDICINE | Facility: CLINIC | Age: 60
End: 2020-01-29

## 2020-02-13 ENCOUNTER — OFFICE VISIT (OUTPATIENT)
Dept: INTERNAL MEDICINE | Facility: CLINIC | Age: 60
End: 2020-02-13
Payer: MEDICARE

## 2020-02-13 VITALS
OXYGEN SATURATION: 98 % | BODY MASS INDEX: 28.14 KG/M2 | HEIGHT: 65 IN | WEIGHT: 168.88 LBS | SYSTOLIC BLOOD PRESSURE: 130 MMHG | DIASTOLIC BLOOD PRESSURE: 84 MMHG | HEART RATE: 78 BPM

## 2020-02-13 DIAGNOSIS — I10 ESSENTIAL HYPERTENSION: ICD-10-CM

## 2020-02-13 DIAGNOSIS — E53.8 CYANOCOBALAMIN DEFICIENCY: ICD-10-CM

## 2020-02-13 DIAGNOSIS — E78.2 MIXED HYPERLIPIDEMIA: ICD-10-CM

## 2020-02-13 DIAGNOSIS — R53.83 FATIGUE, UNSPECIFIED TYPE: ICD-10-CM

## 2020-02-13 DIAGNOSIS — K63.5 POLYP OF COLON, UNSPECIFIED PART OF COLON, UNSPECIFIED TYPE: ICD-10-CM

## 2020-02-13 DIAGNOSIS — D64.9 NORMOCYTIC ANEMIA: ICD-10-CM

## 2020-02-13 DIAGNOSIS — Z23 FLU VACCINE NEED: ICD-10-CM

## 2020-02-13 DIAGNOSIS — R06.09 DOE (DYSPNEA ON EXERTION): Primary | ICD-10-CM

## 2020-02-13 DIAGNOSIS — F33.0 MILD EPISODE OF RECURRENT MAJOR DEPRESSIVE DISORDER: ICD-10-CM

## 2020-02-13 DIAGNOSIS — R39.81 FUNCTIONAL INCONTINENCE: ICD-10-CM

## 2020-02-13 DIAGNOSIS — I70.0 AORTIC ATHEROSCLEROSIS: ICD-10-CM

## 2020-02-13 DIAGNOSIS — Z11.4 SCREENING FOR HIV (HUMAN IMMUNODEFICIENCY VIRUS): ICD-10-CM

## 2020-02-13 DIAGNOSIS — R73.03 PRE-DIABETES: ICD-10-CM

## 2020-02-13 DIAGNOSIS — R19.5 LOOSE STOOLS: ICD-10-CM

## 2020-02-13 PROCEDURE — 99999 PR PBB SHADOW E&M-EST. PATIENT-LVL III: ICD-10-PCS | Mod: PBBFAC,,, | Performed by: INTERNAL MEDICINE

## 2020-02-13 PROCEDURE — 99215 PR OFFICE/OUTPT VISIT, EST, LEVL V, 40-54 MIN: ICD-10-PCS | Mod: S$PBB,25,, | Performed by: INTERNAL MEDICINE

## 2020-02-13 PROCEDURE — 99213 OFFICE O/P EST LOW 20 MIN: CPT | Mod: PBBFAC | Performed by: INTERNAL MEDICINE

## 2020-02-13 PROCEDURE — 99215 OFFICE O/P EST HI 40 MIN: CPT | Mod: S$PBB,25,, | Performed by: INTERNAL MEDICINE

## 2020-02-13 PROCEDURE — 99999 PR PBB SHADOW E&M-EST. PATIENT-LVL III: CPT | Mod: PBBFAC,,, | Performed by: INTERNAL MEDICINE

## 2020-02-13 RX ORDER — FLUOXETINE HYDROCHLORIDE 20 MG/1
20 CAPSULE ORAL DAILY
Qty: 30 CAPSULE | Refills: 6 | Status: SHIPPED | OUTPATIENT
Start: 2020-02-13 | End: 2021-04-06 | Stop reason: SDUPTHER

## 2020-02-13 RX ORDER — ZIPRASIDONE HYDROCHLORIDE 20 MG/1
20 CAPSULE ORAL NIGHTLY
Qty: 30 CAPSULE | Refills: 6 | Status: SHIPPED | OUTPATIENT
Start: 2020-02-13 | End: 2020-12-24 | Stop reason: SDUPTHER

## 2020-02-13 RX ORDER — AMLODIPINE BESYLATE 10 MG/1
10 TABLET ORAL DAILY
Qty: 90 TABLET | Refills: 3 | Status: SHIPPED | OUTPATIENT
Start: 2020-02-13 | End: 2020-12-24 | Stop reason: SDUPTHER

## 2020-02-13 NOTE — PROGRESS NOTES
Subjective:       Patient ID: Binta Yang is a 59 y.o. female who  has a past medical history of Cyanocobalamin deficiency, Depression, Essential hypertension, Hyperlipidemia, Mental retardation, Normocytic anemia, and Pre-diabetes.    Chief Complaint: Follow-up (6 months) and Shortness of Breath    History was obtained from the patient and supplemented through chart review and from her sister-in-law (Irma) who accompanied the patient.    -Following with Urogynecology for urgency.  -Reviewed outside records from [facility] for diarrhea, gallstones, IBS.      History is limited due to MR. WYLIE    LIZAMA:  Chronic.  Unclear distance, but does occur when walking out of her home.  With exertion.  No CP , lightheadedness, dizziness, syncope. 4-5 PND for unknown reason; unclear of orthopnea.  No h/o tobacco.    H/o AR.  Family thinks that she always breathes loudly.  No rhinorrhea, postnasal drip, fever.  H/o LISETH.     HTN:    BP controlled on only Norvasc 10.  No longer on losartan 100.   Diet: Her brother and sister-in-law prepare meals.  She does drink soft drinks.  Eats deli meats for  lunch at the adult day care.  Decreased soft drink intake. Hard to control diet at the adult . Also eats fast food and quick meals sometimes.  She prefers to eat food with soft texture, such as mashed potatoes.     HLD:    Total cholesterol 220.  Not on statin.  Diet as above.   Lab Results   Component Value Date    LDLCALC 134.8 12/21/2018     The 10-year ASCVD risk score (Marco DIVINE Jr., et al., 2013) is: 7.2%    Values used to calculate the score:      Age: 59 years      Sex: Female      Is Non- : Yes      Diabetic: No      Tobacco smoker: No      Systolic Blood Pressure: 130 mmHg      Is BP treated: Yes      HDL Cholesterol: 71 mg/dL      Total Cholesterol: 220 mg/dL    Pre diabetes:   Diet as above.  Lab Results   Component Value Date    HGBA1C 5.8 (H) 12/21/2018     Vitamin B12 deficiency and  "normocytic anemia:    On B12.  UTD MMG. Colonoscopy 7/2019 with polyp.  OBGYN for pelvic exam q2 years .  History of hysterectomy for hygienic reasons and self-care.  Lab Results   Component Value Date    IRON 95 12/21/2018    TIBC 308 12/21/2018    FERRITIN 56 12/21/2018     Lab Results   Component Value Date    HZFAVSVT47 189 (L) 12/21/2018     Colon polyp:  C scope performed 07/31/2019.  No bx result.  Following with [facility].       Depression:    Her daughter passed away 2-3 years ago who had Down syndrome and MR.  Following with [facility], Psychiatry.  Plan to taper off Geodon QHS and continue Prozac 20. Misses some Geodon qHS several days a week since her sister in law works at night and is unable to give her medications.  Patient denies depressed mood, decreased appetite.   She has had a harder time getting up.   Family is unsure how much sleep she gets at night but she is in a dark room.  She is tired during the day.  Denies dysuria, fever, abdominal pain.  Has chronic loose stool and is seeing GI.    Cholelithiasis, loose stool:    Chronic. Denies abdominal pain , fever, change in appetite. Following with [facility].  Recommended acid reflux measures.     Functional fecal and urinary incontinence:    Her sister-in-law thinks that she waits until the last minute and therefore can't get to the restroom in time and is "lazy to get up".  No urinary sx, abdominal pain.  No change in mental status, confusion.  Requires a lot of encouragement to do ADLs.  Hard to do timed voiding at the adult day care.  UCx NG.       Following with Urogynecology.  Unsure if urodynamics, cystoscopy will be of benefit.  Considering sacral neuro modulation.  On oxybutynin, not Myrbetriq.            Not addressed today.  AR: + rhinorrhea, breathing more loudly.  No postnasal drip, fever.  likely unable to use Flonase correctly.  Recommend oral antihistamine.  Counseled on potential sedation.     LISETH:   Confirmed on OSH " PSG.  Is not on CPAP.    Internal hemorrhoids:  Recommend high-fiber diet, hydration.     Intertrigo:  +functional fecal and urinary incontinence Switched to a more gentler soap; stopped bubble baths.  No change in lotions, detergents.  Improved for a month with topical ketoconazole, treatment of incontinence as below, but recurrent.  + pruritus.  Treated with Diflucan for yeast vaginitis.  Recurrent. Hygiene, incontinence contributing. A1C wnl . Keep area clean, dry. Rec breathable clothing, cotton. Topical Ketoconazole. F/u Urogyn for IC.    MR:    At baseline, she has a developmental delay/mild MR.  The patient's brother is her guardian.  She attends an adult  and lives with her brother and sister in law.  Her family prepares her meals.  She can bathe herself if her family members prepare the water.  She requires frequent reminders to shower and brush her teeth.  She can walk and transfer herself without difficulty. She has chronic facial asymmetry and her sister-in-law states her mouth is usually twisted towards the right.  The patient is usually quiet.  She can nod her head and answer in one-word responses; has appropriate responses.  Needs encouragement and assistance to perform ADLs. Goes to adult .     Review of Systems   Constitutional: Negative for activity change and fever.   HENT: Negative for rhinorrhea and sneezing.    Eyes: Negative for redness and itching.   Respiratory: Positive for shortness of breath. Negative for wheezing.    Cardiovascular: Negative for chest pain and leg swelling.   Gastrointestinal: Positive for diarrhea. Negative for abdominal pain, nausea and vomiting.   Genitourinary: Negative for dysuria and hematuria.   Musculoskeletal: Negative for gait problem and joint swelling.   Skin: Negative for color change and rash.   Neurological: Negative for weakness and headaches.   Hematological: Negative for adenopathy.   Psychiatric/Behavioral: Negative for confusion and  "dysphoric mood. The patient is not nervous/anxious.        I personally reviewed Past Medical History, Past Surgical History, Social History, and Family History.    Objective:      Vitals:    02/13/20 1527   BP: 130/84   Pulse: 78   SpO2: 98%   Weight: 76.6 kg (168 lb 14 oz)   Height: 5' 5" (1.651 m)      Physical Exam   Constitutional: She appears well-developed and well-nourished. No distress.   HENT:   Head: Normocephalic and atraumatic.   Nose: No mucosal edema.   Mouth/Throat: Oropharynx is clear and moist. No oropharyngeal exudate.   Eyes: Pupils are equal, round, and reactive to light. Conjunctivae and EOM are normal. Right eye exhibits no discharge. Left eye exhibits no discharge. No scleral icterus.   Neck: Neck supple. No tracheal deviation present. No thyromegaly present.   Cardiovascular: Normal rate, regular rhythm, normal heart sounds and intact distal pulses.   No murmur heard.  Pulmonary/Chest: Effort normal and breath sounds normal. No respiratory distress. She has no wheezes.   Abdominal: Soft. Bowel sounds are normal. She exhibits no distension. There is no tenderness.   Musculoskeletal: She exhibits no edema or deformity.   Lymphadenopathy:     She has no cervical adenopathy.   Neurological: She is alert. No cranial nerve deficit.   Mouth twisted to the R, chronic  Follows simple commands   Skin: Skin is warm and dry. She is not diaphoretic. No erythema.   Psychiatric: She has a normal mood and affect. Her behavior is normal.   Paucity of speech.  A few word answers. Nods appropriately         Lab Results   Component Value Date    WBC 4.90 04/12/2019    HGB 10.9 (L) 04/12/2019    HCT 34.6 (L) 04/12/2019     04/12/2019    CHOL 220 (H) 12/21/2018    TRIG 71 12/21/2018    HDL 71 12/21/2018    ALT 13 04/12/2019    AST 15 04/12/2019     04/12/2019    K 3.3 (L) 04/12/2019     04/12/2019    CREATININE 1.1 04/12/2019    BUN 14 04/12/2019    CO2 24 04/12/2019    HGBA1C 5.8 (H) " 12/21/2018       The 10-year ASCVD risk score (Marco HASKINS Jr., et al., 2013) is: 7.2%    Values used to calculate the score:      Age: 59 years      Sex: Female      Is Non- : Yes      Diabetic: No      Tobacco smoker: No      Systolic Blood Pressure: 130 mmHg      Is BP treated: Yes      HDL Cholesterol: 71 mg/dL      Total Cholesterol: 220 mg/dL    (Imaging have been independently reviewed)   Mammogram without evidence of malignancy, BI-RADS 1.    Assessment:       1. LIZAMA (dyspnea on exertion)    2. Essential hypertension    3. Mixed hyperlipidemia    4. Pre diabetes    5. Cyanocobalamin deficiency    6. Normocytic anemia    7. Polyp of colon, unspecified part of colon, unspecified type    8. Mild episode of recurrent major depressive disorder    9. Fatigue, unspecified type    10. Loose stools    11. Functional incontinence    12. Aortic atherosclerosis    13. Screening for HIV (human immunodeficiency virus)    14. Flu vaccine need          Plan:       Binta was seen today for follow-up and shortness of breath.    Diagnoses and all orders for this visit:    LIZAMA (dyspnea on exertion)  Comments:  D/t age, race, difficulty voicing sx, will check Pharmacologic stress test. ED prompts. If neg, consider ENT for endoscopy or sleep referral for LISETH.  Orders:  -     CBC auto differential; Future  -     Comprehensive metabolic panel; Future  -     Stress Echo Which stress agent will be used? Pharmacological; Color Flow Doppler? No; Future    Essential hypertension  Comments:  At goal on Norvasc 10, continue. Check annual CMP.  Orders:  -     amLODIPine (NORVASC) 10 MG tablet; Take 1 tablet (10 mg total) by mouth once daily.  -     Comprehensive metabolic panel; Future    Mixed hyperlipidemia  Comments:  ASCVD borderline. Difficulty with diet since she goes to adult  and can be a picky eater.  Reschedule repeat FLP, consider starting statin.    Pre diabetes  Comments:  Diet controlled.   Reschedule A1c annually.    Cyanocobalamin deficiency  Comments:  Continue B12 1000 mcg supplement.   Reschedule B12.  Orders:  -     Vitamin B12; Future    Normocytic anemia  Comments:  B12 deficiency as above. UTD on pelvic exam, MMG. C scope with polyp. Calling [facility] for pathology report . Recheck iron panel, CBC.   Orders:  -     CBC auto differential; Future    Polyp of colon, unspecified part of colon, unspecified type  Comments:  Following with [facility].  Requesting biopsy results from [facility] .    Mild episode of recurrent major depressive disorder  Comments:  Reschedule [facility] for possible taper.  Cont Geodon qHS, Prozac.  Orders:  -     FLUoxetine 20 MG capsule; Take 1 capsule (20 mg total) by mouth once daily.  -     ziprasidone (GEODON) 20 MG Cap; Take 1 capsule (20 mg total) by mouth every evening.    Fatigue, unspecified type  Comments:  Might be related to change in psych meds. Reschedule as above. No infx sx. Would need in and out cath urine specimen.    Loose stools  Comments:  Persistent.  Provided phone number to reschedule with [facility].    Functional incontinence  Comments:  Persistent. No s/sx UTI. Family declined invasive procedures. Reschedule Urogyn for medical management.      Aortic atherosclerosis  Comments:  Recheck FLP.    Screening for HIV (human immunodeficiency virus)  -     HIV 1/2 Ag/Ab (4th Gen); Future    Flu vaccine need  Comments:  Advised to obtain vaccine at Pharmacy.           Side effects of medication(s) were discussed in detail and patient voiced understanding.  Patient will call back for any issues or complications.     RTC in 6 month(s) or sooner PRN for HTN, coordination of care.

## 2020-07-17 DIAGNOSIS — Z71.89 COMPLEX CARE COORDINATION: ICD-10-CM

## 2020-08-17 DIAGNOSIS — N32.81 OAB (OVERACTIVE BLADDER): Primary | ICD-10-CM

## 2020-08-17 RX ORDER — OXYBUTYNIN CHLORIDE 10 MG/1
10 TABLET, EXTENDED RELEASE ORAL DAILY
Qty: 30 TABLET | Refills: 12 | Status: SHIPPED | OUTPATIENT
Start: 2020-08-17 | End: 2022-05-19

## 2020-09-21 ENCOUNTER — TELEPHONE (OUTPATIENT)
Dept: INTERNAL MEDICINE | Facility: CLINIC | Age: 60
End: 2020-09-21

## 2020-09-21 NOTE — TELEPHONE ENCOUNTER
----- Message from Madina Guthrie sent at 9/21/2020 12:41 PM CDT -----  Regarding: Call Back  Name of Who is Calling : Melinda Yang (Relative)    Melinda Yang (Relative) is requesting a call from staff in regards to refilling patient medication .....Please contact to further discuss and advise.    Can the clinic reply by [facility] : No    What Number to Call Back :  [phone]

## 2020-12-10 ENCOUNTER — PATIENT OUTREACH (OUTPATIENT)
Dept: ADMINISTRATIVE | Facility: HOSPITAL | Age: 60
End: 2020-12-10

## 2020-12-10 DIAGNOSIS — Z12.31 ENCOUNTER FOR SCREENING MAMMOGRAM FOR BREAST CANCER: Primary | ICD-10-CM

## 2020-12-24 ENCOUNTER — HOSPITAL ENCOUNTER (OUTPATIENT)
Dept: RADIOLOGY | Facility: OTHER | Age: 60
Discharge: HOME | End: 2020-12-24
Attending: INTERNAL MEDICINE
Payer: MEDICARE

## 2020-12-24 ENCOUNTER — OFFICE VISIT (OUTPATIENT)
Dept: INTERNAL MEDICINE | Facility: CLINIC | Age: 60
End: 2020-12-24
Payer: MEDICARE

## 2020-12-24 VITALS
WEIGHT: 180.31 LBS | DIASTOLIC BLOOD PRESSURE: 69 MMHG | BODY MASS INDEX: 30.04 KG/M2 | HEIGHT: 65 IN | SYSTOLIC BLOOD PRESSURE: 118 MMHG | OXYGEN SATURATION: 99 % | HEART RATE: 84 BPM

## 2020-12-24 DIAGNOSIS — R73.03 PRE-DIABETES: ICD-10-CM

## 2020-12-24 DIAGNOSIS — Z23 INFLUENZA VACCINE NEEDED: ICD-10-CM

## 2020-12-24 DIAGNOSIS — Z12.31 ENCOUNTER FOR SCREENING MAMMOGRAM FOR BREAST CANCER: ICD-10-CM

## 2020-12-24 DIAGNOSIS — E53.8 CYANOCOBALAMIN DEFICIENCY: ICD-10-CM

## 2020-12-24 DIAGNOSIS — D64.9 NORMOCYTIC ANEMIA: ICD-10-CM

## 2020-12-24 DIAGNOSIS — E78.00 ELEVATED CHOLESTEROL: ICD-10-CM

## 2020-12-24 DIAGNOSIS — J30.89 SEASONAL ALLERGIC RHINITIS DUE TO OTHER ALLERGIC TRIGGER: ICD-10-CM

## 2020-12-24 DIAGNOSIS — I10 ESSENTIAL HYPERTENSION: Primary | ICD-10-CM

## 2020-12-24 DIAGNOSIS — F33.0 MILD EPISODE OF RECURRENT MAJOR DEPRESSIVE DISORDER: ICD-10-CM

## 2020-12-24 DIAGNOSIS — N32.81 OAB (OVERACTIVE BLADDER): ICD-10-CM

## 2020-12-24 PROCEDURE — 99999 PR PBB SHADOW E&M-EST. PATIENT-LVL III: CPT | Mod: PBBFAC,,, | Performed by: INTERNAL MEDICINE

## 2020-12-24 PROCEDURE — 77067 SCR MAMMO BI INCL CAD: CPT | Mod: TC

## 2020-12-24 PROCEDURE — 99215 PR OFFICE/OUTPT VISIT, EST, LEVL V, 40-54 MIN: ICD-10-PCS | Mod: S$PBB,,, | Performed by: INTERNAL MEDICINE

## 2020-12-24 PROCEDURE — 99215 OFFICE O/P EST HI 40 MIN: CPT | Mod: S$PBB,,, | Performed by: INTERNAL MEDICINE

## 2020-12-24 PROCEDURE — 99213 OFFICE O/P EST LOW 20 MIN: CPT | Mod: PBBFAC,25 | Performed by: INTERNAL MEDICINE

## 2020-12-24 PROCEDURE — 90686 IIV4 VACC NO PRSV 0.5 ML IM: CPT | Mod: PBBFAC

## 2020-12-24 PROCEDURE — 77063 BREAST TOMOSYNTHESIS BI: CPT | Mod: 26,,, | Performed by: RADIOLOGY

## 2020-12-24 PROCEDURE — 99999 PR PBB SHADOW E&M-EST. PATIENT-LVL III: ICD-10-PCS | Mod: PBBFAC,,, | Performed by: INTERNAL MEDICINE

## 2020-12-24 PROCEDURE — 77067 MAMMO DIGITAL SCREENING BILAT WITH TOMO: ICD-10-PCS | Mod: 26,,, | Performed by: RADIOLOGY

## 2020-12-24 PROCEDURE — 77063 MAMMO DIGITAL SCREENING BILAT WITH TOMO: ICD-10-PCS | Mod: 26,,, | Performed by: RADIOLOGY

## 2020-12-24 PROCEDURE — 77067 SCR MAMMO BI INCL CAD: CPT | Mod: 26,,, | Performed by: RADIOLOGY

## 2020-12-24 RX ORDER — ZIPRASIDONE HYDROCHLORIDE 20 MG/1
20 CAPSULE ORAL NIGHTLY
Qty: 30 CAPSULE | Refills: 6 | Status: SHIPPED | OUTPATIENT
Start: 2020-12-24 | End: 2021-07-22

## 2020-12-24 RX ORDER — MINERAL OIL
180 ENEMA (ML) RECTAL DAILY
Qty: 30 TABLET | Refills: 11 | Status: SHIPPED | OUTPATIENT
Start: 2020-12-24 | End: 2021-04-06 | Stop reason: SDUPTHER

## 2020-12-24 RX ORDER — OXYBUTYNIN CHLORIDE 10 MG/1
10 TABLET, EXTENDED RELEASE ORAL DAILY
Qty: 30 TABLET | Refills: 11 | Status: CANCELLED | OUTPATIENT
Start: 2020-12-24

## 2020-12-24 RX ORDER — LANOLIN ALCOHOL/MO/W.PET/CERES
1000 CREAM (GRAM) TOPICAL DAILY
Qty: 90 TABLET | Refills: 3 | Status: SHIPPED | OUTPATIENT
Start: 2020-12-24 | End: 2021-04-06 | Stop reason: SDUPTHER

## 2020-12-24 RX ORDER — AMLODIPINE BESYLATE 10 MG/1
10 TABLET ORAL DAILY
Qty: 90 TABLET | Refills: 3 | Status: SHIPPED | OUTPATIENT
Start: 2020-12-24 | End: 2021-11-02 | Stop reason: SDUPTHER

## 2020-12-24 NOTE — PROGRESS NOTES
"Patient was given vaccine information sheet for the Flu Vaccine. The area of injection was palpated using the acromion process as a landmark. This area was cleaned with alcohol. Using a 25g 1" safety needle, 0.5mL of the vaccine was placed into the left muscle. The injection site was dressed with a bandage. Patient experienced no complications and was discharged in stable condition. Fluarix vaccine Lot: [id number] Exp: 06/30/21    "

## 2020-12-24 NOTE — PROGRESS NOTES
Subjective:       Patient ID: Binta Yang is a 59 y.o. female who  has a past medical history of Cyanocobalamin deficiency, Depression, Essential hypertension, Hyperlipidemia, Mental retardation, Normocytic anemia, and Pre-diabetes.    Chief Complaint: Annual Exam (flu vaccine)    History was obtained from the patient and supplemented through chart review and from her sister-in-law (Irma) who accompanied the patient.    There were no ER or clinic visits since our last appointment.    History is limited due to     HPI    No complaints today, but ran out of meds d/t the pandemic for a few months.    HTN:    BP controlled on Norvasc 10.  No longer on losartan 100.   Home BP: elevated.    Diet: Her brother and sister-in-law prepare meals. Was eating deli meats for lunch at the adult day care. Hard to control diet at the adult . Now at home d/t pandemic. She prefers to eat food with soft texture, such as mashed potatoes. Increased sweets lately.    Drinks: soft drinks. Increased hydration.    Exercise: none. Watches TV.     HLD:    Total cholesterol 220.  Not on statin.  Diet as above.   Lab Results   Component Value Date    LDLCALC 134.8 12/21/2018     The 10-year ASCVD risk score (Millen DC Jr., et al., 2013) is: 5.5%    Values used to calculate the score:      Age: 59 years      Sex: Female      Is Non- : Yes      Diabetic: No      Tobacco smoker: No      Systolic Blood Pressure: 118 mmHg      Is BP treated: Yes      HDL Cholesterol: 71 mg/dL      Total Cholesterol: 220 mg/dL    Pre diabetes:   Diet as above.  Lab Results   Component Value Date    HGBA1C 5.8 (H) 12/21/2018     Vitamin B12 deficiency, normocytic anemia:    Colonoscopy 7/31/2019 with polyp. History of hysterectomy for hygienic reasons and self-care.  OBGYN for pelvic exam q2 years .    Lab Results   Component Value Date    IRON 95 12/21/2018    TIBC 308 12/21/2018    FERRITIN 56 12/21/2018     Lab Results  "  Component Value Date    VXWKNCBJ16 189 (L) 12/21/2018                   Not addressed today.  LIZAMA:  Chronic.  Unclear distance, but does occur when walking out of her home.  With exertion.  No CP , lightheadedness, dizziness, syncope. 4-5 PND for unknown reason; unclear of orthopnea.  No h/o tobacco.    H/o AR.  Family thinks that she always breathes loudly.  No rhinorrhea, postnasal drip, fever.  H/o LISETH.     She never scheduled pharmacologic stress test.  D/t age, race, difficulty voicing sx, will check Pharmacologic stress test. ED prompts. If neg, consider ENT for endoscopy or sleep referral for LISETH.    Colon polyp:    C scope performed 07/31/2019.  No bx result.  Following with [facility].    Following with [facility].  Requesting biopsy results from [facility] .     Depression:    Her daughter passed away 2-3 years ago who had Down syndrome and MRMoy  Following with [facility], Psychiatry.  Plan to taper off Geodon QHS and continue Prozac 20. Misses some Geodon qHS several days a week since her sister in law works at night and is unable to give her medications.  Patient denies depressed mood, decreased appetite.   She has had a harder time getting up.   Family is unsure how much sleep she gets at night but she is in a dark room.  She is tired during the day.  Denies dysuria, fever, abdominal pain.  Has chronic loose stool and is seeing GI.    Fatigue:  Might be related to change in psych meds. Reschedule as above. No infx sx. Would need in and out cath urine specimen.    Cholelithiasis, loose stool:    Chronic. Denies abdominal pain , fever, change in appetite. Following with [facility].  Recommended acid reflux measures.  Persistent.  Provided phone number to reschedule with [facility].     Functional fecal and urinary incontinence:    Her sister-in-law thinks that she waits until the last minute and therefore can't get to the restroom in time and is "lazy to get up".  No urinary sx, abdominal pain.  No " change in mental status, confusion.  Requires a lot of encouragement to do ADLs.  Hard to do timed voiding at the adult day care.  UCx NG.       Following with Urogynecology.  Unsure if urodynamics, cystoscopy will be of benefit.  Considering sacral neuro modulation.  On oxybutynin, not Myrbetriq.  Persistent. No s/sx UTI. Family declined invasive procedures. Reschedule Urogyn for medical management.    AR:   + rhinorrhea, breathing more loudly.  No postnasal drip, fever.  likely unable to use Flonase correctly.  Recommend oral antihistamine.  Counseled on potential sedation.     LISETH:     Confirmed on OSH PSG.  Is not on CPAP.     Intertrigo:  +functional fecal and urinary incontinence Switched to a more gentler soap; stopped bubble baths.  No change in lotions, detergents.  Improved for a month with topical ketoconazole, treatment of incontinence as below, but recurrent.  + pruritus.  Treated with Diflucan for yeast vaginitis.  Recurrent. Hygiene, incontinence contributing. A1C wnl . Keep area clean, dry. Rec breathable clothing, cotton. Topical Ketoconazole. F/u Urogyn for IC.    MR:    At baseline, she has a developmental delay/mild MR.  The patient's brother is her guardian.  She attends an adult  and lives with her brother and sister in law.  Her family prepares her meals.  She can bathe herself if her family members prepare the water.  She requires frequent reminders to shower and brush her teeth.  She can walk and transfer herself without difficulty. She has chronic facial asymmetry and her sister-in-law states her mouth is usually twisted towards the right.  The patient is usually quiet.  She can nod her head and answer in one-word responses; has appropriate responses.  Needs encouragement and assistance to perform ADLs. Goes to adult .     Review of Systems   Constitutional: Negative for activity change and fever.   HENT: Negative for rhinorrhea and sneezing.    Eyes: Negative for redness and  "itching.   Respiratory: Negative for shortness of breath and wheezing.    Cardiovascular: Negative for chest pain and leg swelling.   Gastrointestinal: Positive for diarrhea. Negative for abdominal pain, nausea and vomiting.   Genitourinary: Negative for dysuria and hematuria.   Musculoskeletal: Negative for gait problem and joint swelling.   Skin: Negative for color change and rash.   Neurological: Negative for weakness and headaches.   Hematological: Negative for adenopathy.   Psychiatric/Behavioral: Negative for confusion and dysphoric mood. The patient is not nervous/anxious.        I personally reviewed Past Medical History, Past Surgical History, Social History, and Family History.    Objective:      Vitals:    12/24/20 0833 12/24/20 0837   BP: (!) 170/75 118/69   Pulse: 84    SpO2: 99%    Weight: 81.8 kg (180 lb 5.4 oz)    Height: 5' 5" (1.651 m)       Physical Exam  Constitutional:       General: She is not in acute distress.     Appearance: She is well-developed. She is not diaphoretic.   HENT:      Head: Normocephalic and atraumatic.      Nose: No mucosal edema.      Mouth/Throat:      Pharynx: No oropharyngeal exudate.   Eyes:      General: No scleral icterus.        Right eye: No discharge.         Left eye: No discharge.      Conjunctiva/sclera: Conjunctivae normal.      Pupils: Pupils are equal, round, and reactive to light.   Neck:      Musculoskeletal: Neck supple.      Thyroid: No thyromegaly.      Trachea: No tracheal deviation.   Cardiovascular:      Rate and Rhythm: Normal rate and regular rhythm.      Heart sounds: Normal heart sounds. No murmur.   Pulmonary:      Effort: Pulmonary effort is normal. No respiratory distress.      Breath sounds: Normal breath sounds. No wheezing.   Abdominal:      General: Bowel sounds are normal. There is no distension.      Palpations: Abdomen is soft.      Tenderness: There is no abdominal tenderness.   Musculoskeletal:         General: No deformity. "   Lymphadenopathy:      Cervical: No cervical adenopathy.   Skin:     General: Skin is warm and dry.      Findings: No erythema.   Neurological:      Mental Status: She is alert.      Cranial Nerves: No cranial nerve deficit.      Comments: Mouth twisted to the R, chronic  Follows simple commands   Psychiatric:         Behavior: Behavior normal.      Comments: Paucity of speech.  A few word answers. Nods appropriately           Lab Results   Component Value Date    WBC 4.90 04/12/2019    HGB 10.9 (L) 04/12/2019    HCT 34.6 (L) 04/12/2019     04/12/2019    CHOL 220 (H) 12/21/2018    TRIG 71 12/21/2018    HDL 71 12/21/2018    ALT 13 04/12/2019    AST 15 04/12/2019     04/12/2019    K 3.3 (L) 04/12/2019     04/12/2019    CREATININE 1.1 04/12/2019    BUN 14 04/12/2019    CO2 24 04/12/2019    HGBA1C 5.8 (H) 12/21/2018       The 10-year ASCVD risk score (Marco DIVINE Jr., et al., 2013) is: 5.5%    Values used to calculate the score:      Age: 59 years      Sex: Female      Is Non- : Yes      Diabetic: No      Tobacco smoker: No      Systolic Blood Pressure: 118 mmHg      Is BP treated: Yes      HDL Cholesterol: 71 mg/dL      Total Cholesterol: 220 mg/dL    (Imaging have been independently reviewed)   Mammogram without evidence of malignancy, BI-RADS 1.    Assessment:       1. Essential hypertension    2. Elevated cholesterol    3. Pre-diabetes    4. Cyanocobalamin deficiency    5. Normocytic anemia    6. Seasonal allergic rhinitis due to other allergic trigger    7. OAB (overactive bladder)    8. Mild episode of recurrent major depressive disorder    9. Influenza vaccine needed          Plan:       Binta was seen today for annual exam.    Diagnoses and all orders for this visit:    Essential hypertension  Comments:  At goal, but elevated at home. Restart Norvasc 10. Reschedule CMP. Discussed well balanced diet, taking walks daily.  Orders:  -     amLODIPine (NORVASC) 10 MG  tablet; Take 1 tablet (10 mg total) by mouth once daily.    Elevated cholesterol  Comments:  Borderline high, ASCVD improved. Discussed well balacned diet. Reschedule repeat FLP.  Orders:  -     Lipid Panel; Future    Pre-diabetes  Comments:  Diet controlled.  Reschedule A1c annually.  Orders:  -     Hemoglobin A1C; Future    Cyanocobalamin deficiency  Comments:  Continue B12 1000 mcg supplement.   Reschedule B12.  Orders:  -     cyanocobalamin (VITAMIN B-12) 1000 MCG tablet; Take 1 tablet (1,000 mcg total) by mouth once daily.    Normocytic anemia  Comments:  B12 deficiency as above. Has MMG scheduled. Cscope . Recheck CBC.     Seasonal allergic rhinitis due to other allergic trigger  Comments:  likely unable to use Flonase correctly.  Recommend oral antihistamine.  Counseled on potential sedation.  Orders:  -     fexofenadine (ALLEGRA) 180 MG tablet; Take 1 tablet (180 mg total) by mouth once daily.    OAB (overactive bladder)  Comments:  Stable. Refilled med.  Reschedule with Urogynecology.    Mild episode of recurrent major depressive disorder  Comments:  Cont Geodon qHS, Prozac. Providing # for [facility]  Orders:  -     ziprasidone (GEODON) 20 MG Cap; Take 1 capsule (20 mg total) by mouth every evening.  -     Ambulatory referral/consult to Psychiatry; Future    Influenza vaccine needed  Comments:  Administered in clinic.  Orders:  -     Influenza - Quadrivalent (PF)    Other orders  -     Cancel: oxybutynin (DITROPAN-XL) 10 MG 24 hr tablet; Take 1 tablet (10 mg total) by mouth once daily.         Side effects of medication(s) were discussed in detail and patient voiced understanding.  Patient will call back for any issues or complications.     RTC in 6 month(s) or sooner PRN for HTN, coordination of care.

## 2020-12-26 ENCOUNTER — TELEPHONE (OUTPATIENT)
Dept: INTERNAL MEDICINE | Facility: CLINIC | Age: 60
End: 2020-12-26

## 2020-12-26 NOTE — TELEPHONE ENCOUNTER
----- Message from Lori Weir MD sent at 12/24/2020 12:17 PM CST -----  Her form has been completed.  Please return to the patient (her sister in law). Thank you!  -Please print out my clinic note from 12/24/2020.    -print her medication list  -stamp both forms

## 2020-12-27 DIAGNOSIS — E11.9 TYPE 2 DIABETES MELLITUS WITHOUT COMPLICATION, WITHOUT LONG-TERM CURRENT USE OF INSULIN: Primary | ICD-10-CM

## 2021-01-04 ENCOUNTER — TELEPHONE (OUTPATIENT)
Dept: INTERNAL MEDICINE | Facility: CLINIC | Age: 61
End: 2021-01-04

## 2021-01-07 ENCOUNTER — TELEPHONE (OUTPATIENT)
Dept: INTERNAL MEDICINE | Facility: CLINIC | Age: 61
End: 2021-01-07

## 2021-03-15 ENCOUNTER — HOSPITAL ENCOUNTER (EMERGENCY)
Facility: OTHER | Age: 61
Discharge: HOME | End: 2021-03-15
Attending: EMERGENCY MEDICINE
Payer: MEDICARE

## 2021-03-15 VITALS
DIASTOLIC BLOOD PRESSURE: 74 MMHG | HEIGHT: 65 IN | HEART RATE: 86 BPM | TEMPERATURE: 99 F | SYSTOLIC BLOOD PRESSURE: 157 MMHG | WEIGHT: 180 LBS | RESPIRATION RATE: 18 BRPM | BODY MASS INDEX: 29.99 KG/M2 | OXYGEN SATURATION: 99 %
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
## 2021-03-15 DIAGNOSIS — R40.20 LOC (LOSS OF CONSCIOUSNESS): ICD-10-CM

## 2021-03-15 LAB
ALBUMIN SERPL BCP-MCNC: 3.3 G/DL (ref 3.5–5.2)
ALP SERPL-CCNC: 94 U/L (ref 55–135)
ALT SERPL W/O P-5'-P-CCNC: 12 U/L (ref 10–44)
ANION GAP SERPL CALC-SCNC: 11 MMOL/L (ref 8–16)
AST SERPL-CCNC: 15 U/L (ref 10–40)
BASOPHILS # BLD AUTO: 0.05 K/UL (ref 0–0.2)
BASOPHILS NFR BLD: 0.6 % (ref 0–1.9)
BILIRUB SERPL-MCNC: 0.7 MG/DL (ref 0.1–1)
BUN SERPL-MCNC: 12 MG/DL (ref 8–23)
CALCIUM SERPL-MCNC: 9 MG/DL (ref 8.7–10.5)
CHLORIDE SERPL-SCNC: 106 MMOL/L (ref 95–110)
CO2 SERPL-SCNC: 23 MMOL/L (ref 23–29)
CREAT SERPL-MCNC: 0.9 MG/DL (ref 0.5–1.4)
DIFFERENTIAL METHOD: ABNORMAL
EOSINOPHIL # BLD AUTO: 0 K/UL (ref 0–0.5)
EOSINOPHIL NFR BLD: 0.5 % (ref 0–8)
ERYTHROCYTE [DISTWIDTH] IN BLOOD BY AUTOMATED COUNT: 15.1 % (ref 11.5–14.5)
EST. GFR  (AFRICAN AMERICAN): >60 ML/MIN/1.73 M^2
EST. GFR  (NON AFRICAN AMERICAN): >60 ML/MIN/1.73 M^2
GLUCOSE SERPL-MCNC: 156 MG/DL (ref 70–110)
HCT VFR BLD AUTO: 35.2 % (ref 37–48.5)
HGB BLD-MCNC: 11.1 G/DL (ref 12–16)
IMM GRANULOCYTES # BLD AUTO: 0.02 K/UL (ref 0–0.04)
IMM GRANULOCYTES NFR BLD AUTO: 0.2 % (ref 0–0.5)
LYMPHOCYTES # BLD AUTO: 1.1 K/UL (ref 1–4.8)
LYMPHOCYTES NFR BLD: 13.1 % (ref 18–48)
MAGNESIUM SERPL-MCNC: 1.5 MG/DL (ref 1.6–2.6)
MCH RBC QN AUTO: 25.9 PG (ref 27–31)
MCHC RBC AUTO-ENTMCNC: 31.5 G/DL (ref 32–36)
MCV RBC AUTO: 82 FL (ref 82–98)
MONOCYTES # BLD AUTO: 0.5 K/UL (ref 0.3–1)
MONOCYTES NFR BLD: 6.6 % (ref 4–15)
NEUTROPHILS # BLD AUTO: 6.5 K/UL (ref 1.8–7.7)
NEUTROPHILS NFR BLD: 79 % (ref 38–73)
NRBC BLD-RTO: 0 /100 WBC
PLATELET # BLD AUTO: 286 K/UL (ref 150–350)
PMV BLD AUTO: 9.7 FL (ref 9.2–12.9)
POCT GLUCOSE: 180 MG/DL (ref 70–110)
POTASSIUM SERPL-SCNC: 3.6 MMOL/L (ref 3.5–5.1)
PROT SERPL-MCNC: 7.5 G/DL (ref 6–8.4)
RBC # BLD AUTO: 4.28 M/UL (ref 4–5.4)
SODIUM SERPL-SCNC: 140 MMOL/L (ref 136–145)
TROPONIN I SERPL DL<=0.01 NG/ML-MCNC: <0.006 NG/ML (ref 0–0.03)
WBC # BLD AUTO: 8.23 K/UL (ref 3.9–12.7)

## 2021-03-15 PROCEDURE — 93010 EKG 12-LEAD: ICD-10-PCS | Mod: ,,, | Performed by: INTERNAL MEDICINE

## 2021-03-15 PROCEDURE — 25000003 PHARM REV CODE 250: Performed by: EMERGENCY MEDICINE

## 2021-03-15 PROCEDURE — 84484 ASSAY OF TROPONIN QUANT: CPT | Performed by: PHYSICIAN ASSISTANT

## 2021-03-15 PROCEDURE — 93010 ELECTROCARDIOGRAM REPORT: CPT | Mod: ,,, | Performed by: INTERNAL MEDICINE

## 2021-03-15 PROCEDURE — 93005 ELECTROCARDIOGRAM TRACING: CPT

## 2021-03-15 PROCEDURE — 85025 COMPLETE CBC W/AUTO DIFF WBC: CPT | Performed by: PHYSICIAN ASSISTANT

## 2021-03-15 PROCEDURE — 80053 COMPREHEN METABOLIC PANEL: CPT | Performed by: PHYSICIAN ASSISTANT

## 2021-03-15 PROCEDURE — 96360 HYDRATION IV INFUSION INIT: CPT

## 2021-03-15 PROCEDURE — 82962 GLUCOSE BLOOD TEST: CPT

## 2021-03-15 PROCEDURE — 99285 EMERGENCY DEPT VISIT HI MDM: CPT | Mod: 25

## 2021-03-15 PROCEDURE — 83735 ASSAY OF MAGNESIUM: CPT | Performed by: PHYSICIAN ASSISTANT

## 2021-03-15 RX ORDER — SODIUM CHLORIDE 9 MG/ML
INJECTION, SOLUTION INTRAVENOUS
Status: COMPLETED | OUTPATIENT
Start: 2021-03-15 | End: 2021-03-15

## 2021-03-15 RX ADMIN — SODIUM CHLORIDE 500 ML/HR: 0.9 INJECTION, SOLUTION INTRAVENOUS at 05:03

## 2021-03-30 ENCOUNTER — TELEPHONE (OUTPATIENT)
Dept: INTERNAL MEDICINE | Facility: CLINIC | Age: 61
End: 2021-03-30

## 2021-04-06 ENCOUNTER — LAB VISIT (OUTPATIENT)
Dept: LAB | Facility: OTHER | Age: 61
End: 2021-04-06
Attending: INTERNAL MEDICINE
Payer: MEDICARE

## 2021-04-06 ENCOUNTER — OFFICE VISIT (OUTPATIENT)
Dept: INTERNAL MEDICINE | Facility: CLINIC | Age: 61
End: 2021-04-06
Payer: MEDICARE

## 2021-04-06 VITALS
HEART RATE: 79 BPM | OXYGEN SATURATION: 98 % | WEIGHT: 171.06 LBS | DIASTOLIC BLOOD PRESSURE: 74 MMHG | HEIGHT: 65 IN | SYSTOLIC BLOOD PRESSURE: 138 MMHG | BODY MASS INDEX: 28.5 KG/M2

## 2021-04-06 DIAGNOSIS — R19.5 LOOSE STOOLS: ICD-10-CM

## 2021-04-06 DIAGNOSIS — D64.9 NORMOCYTIC ANEMIA: ICD-10-CM

## 2021-04-06 DIAGNOSIS — E53.8 CYANOCOBALAMIN DEFICIENCY: ICD-10-CM

## 2021-04-06 DIAGNOSIS — R40.20 LOC (LOSS OF CONSCIOUSNESS): ICD-10-CM

## 2021-04-06 DIAGNOSIS — I10 ESSENTIAL HYPERTENSION: ICD-10-CM

## 2021-04-06 DIAGNOSIS — R40.20 LOC (LOSS OF CONSCIOUSNESS): Primary | ICD-10-CM

## 2021-04-06 DIAGNOSIS — Z11.4 SCREENING FOR HIV (HUMAN IMMUNODEFICIENCY VIRUS): ICD-10-CM

## 2021-04-06 DIAGNOSIS — F33.0 MILD EPISODE OF RECURRENT MAJOR DEPRESSIVE DISORDER: ICD-10-CM

## 2021-04-06 DIAGNOSIS — E11.9 TYPE 2 DIABETES MELLITUS WITHOUT COMPLICATION, WITHOUT LONG-TERM CURRENT USE OF INSULIN: ICD-10-CM

## 2021-04-06 DIAGNOSIS — J30.89 SEASONAL ALLERGIC RHINITIS DUE TO OTHER ALLERGIC TRIGGER: ICD-10-CM

## 2021-04-06 LAB — TSH SERPL DL<=0.005 MIU/L-ACNC: 1.16 UIU/ML (ref 0.4–4)

## 2021-04-06 PROCEDURE — 36415 COLL VENOUS BLD VENIPUNCTURE: CPT | Performed by: INTERNAL MEDICINE

## 2021-04-06 PROCEDURE — 99215 OFFICE O/P EST HI 40 MIN: CPT | Mod: S$PBB,,, | Performed by: INTERNAL MEDICINE

## 2021-04-06 PROCEDURE — 86703 HIV-1/HIV-2 1 RESULT ANTBDY: CPT | Performed by: INTERNAL MEDICINE

## 2021-04-06 PROCEDURE — 83036 HEMOGLOBIN GLYCOSYLATED A1C: CPT | Performed by: INTERNAL MEDICINE

## 2021-04-06 PROCEDURE — 99999 PR PBB SHADOW E&M-EST. PATIENT-LVL IV: CPT | Mod: PBBFAC,,, | Performed by: INTERNAL MEDICINE

## 2021-04-06 PROCEDURE — 99215 PR OFFICE/OUTPT VISIT, EST, LEVL V, 40-54 MIN: ICD-10-PCS | Mod: S$PBB,,, | Performed by: INTERNAL MEDICINE

## 2021-04-06 PROCEDURE — 84443 ASSAY THYROID STIM HORMONE: CPT | Performed by: INTERNAL MEDICINE

## 2021-04-06 PROCEDURE — 99999 PR PBB SHADOW E&M-EST. PATIENT-LVL IV: ICD-10-PCS | Mod: PBBFAC,,, | Performed by: INTERNAL MEDICINE

## 2021-04-06 PROCEDURE — 82607 VITAMIN B-12: CPT | Performed by: INTERNAL MEDICINE

## 2021-04-06 PROCEDURE — 82728 ASSAY OF FERRITIN: CPT | Performed by: INTERNAL MEDICINE

## 2021-04-06 PROCEDURE — 83540 ASSAY OF IRON: CPT | Performed by: INTERNAL MEDICINE

## 2021-04-06 PROCEDURE — 99214 OFFICE O/P EST MOD 30 MIN: CPT | Mod: PBBFAC | Performed by: INTERNAL MEDICINE

## 2021-04-06 RX ORDER — LEVETIRACETAM 500 MG/1
500 TABLET ORAL 2 TIMES DAILY
Qty: 60 TABLET | Refills: 11 | Status: SHIPPED | OUTPATIENT
Start: 2021-04-06 | End: 2021-07-22 | Stop reason: DRUGHIGH

## 2021-04-06 RX ORDER — LANOLIN ALCOHOL/MO/W.PET/CERES
1000 CREAM (GRAM) TOPICAL DAILY
Qty: 90 TABLET | Refills: 3 | Status: SHIPPED | OUTPATIENT
Start: 2021-04-06 | End: 2021-11-02 | Stop reason: SDUPTHER

## 2021-04-06 RX ORDER — FLUOXETINE HYDROCHLORIDE 20 MG/1
20 CAPSULE ORAL DAILY
Qty: 30 CAPSULE | Refills: 6 | Status: SHIPPED | OUTPATIENT
Start: 2021-04-06 | End: 2021-07-22

## 2021-04-06 RX ORDER — MINERAL OIL
180 ENEMA (ML) RECTAL DAILY
Qty: 30 TABLET | Refills: 11 | Status: SHIPPED | OUTPATIENT
Start: 2021-04-06 | End: 2021-11-02 | Stop reason: SDUPTHER

## 2021-04-07 LAB
ESTIMATED AVG GLUCOSE: 148 MG/DL (ref 68–131)
HBA1C MFR BLD: 6.8 % (ref 4–5.6)
HIV 1+2 AB+HIV1 P24 AG SERPL QL IA: NEGATIVE
IRON SERPL-MCNC: 75 UG/DL (ref 30–160)
SATURATED IRON: 24 % (ref 20–50)
TOTAL IRON BINDING CAPACITY: 317 UG/DL (ref 250–450)
TRANSFERRIN SERPL-MCNC: 214 MG/DL (ref 200–375)
VIT B12 SERPL-MCNC: 311 PG/ML (ref 210–950)

## 2021-04-08 LAB — FERRITIN SERPL-MCNC: 76 NG/ML (ref 20–300)

## 2021-04-09 ENCOUNTER — TELEPHONE (OUTPATIENT)
Dept: INTERNAL MEDICINE | Facility: CLINIC | Age: 61
End: 2021-04-09

## 2021-04-15 ENCOUNTER — HOSPITAL ENCOUNTER (OUTPATIENT)
Dept: RADIOLOGY | Facility: OTHER | Age: 61
Discharge: HOME | End: 2021-04-15
Attending: INTERNAL MEDICINE
Payer: MEDICARE

## 2021-04-15 DIAGNOSIS — R40.20 LOC (LOSS OF CONSCIOUSNESS): ICD-10-CM

## 2021-04-15 PROCEDURE — 70553 MRI BRAIN STEM W/O & W/DYE: CPT | Mod: 26,,, | Performed by: RADIOLOGY

## 2021-04-15 PROCEDURE — 70553 MRI BRAIN W WO CONTRAST: ICD-10-PCS | Mod: 26,,, | Performed by: RADIOLOGY

## 2021-04-15 PROCEDURE — 70553 MRI BRAIN STEM W/O & W/DYE: CPT | Mod: TC

## 2021-04-15 PROCEDURE — 25500020 PHARM REV CODE 255: Performed by: INTERNAL MEDICINE

## 2021-04-15 PROCEDURE — A9585 GADOBUTROL INJECTION: HCPCS | Performed by: INTERNAL MEDICINE

## 2021-04-15 RX ORDER — GADOBUTROL 604.72 MG/ML
8 INJECTION INTRAVENOUS
Status: COMPLETED | OUTPATIENT
Start: 2021-04-15 | End: 2021-04-15

## 2021-04-15 RX ADMIN — GADOBUTROL 8 ML: 604.72 INJECTION INTRAVENOUS at 08:04

## 2021-04-16 ENCOUNTER — HOSPITAL ENCOUNTER (OUTPATIENT)
Dept: RADIOLOGY | Facility: OTHER | Age: 61
Discharge: HOME | End: 2021-04-16
Attending: INTERNAL MEDICINE
Payer: MEDICARE

## 2021-04-16 DIAGNOSIS — R40.20 LOC (LOSS OF CONSCIOUSNESS): ICD-10-CM

## 2021-04-16 PROCEDURE — 70552 MRI BRAIN STEM W/DYE: CPT | Mod: 26,,, | Performed by: RADIOLOGY

## 2021-04-16 PROCEDURE — 70552 MRI BRAIN WITH CONTRAST: ICD-10-PCS | Mod: 26,,, | Performed by: RADIOLOGY

## 2021-04-16 PROCEDURE — 70552 MRI BRAIN STEM W/DYE: CPT | Mod: TC

## 2021-04-16 PROCEDURE — A9585 GADOBUTROL INJECTION: HCPCS | Performed by: INTERNAL MEDICINE

## 2021-04-16 PROCEDURE — 25500020 PHARM REV CODE 255: Performed by: INTERNAL MEDICINE

## 2021-04-16 RX ORDER — GADOBUTROL 604.72 MG/ML
7.5 INJECTION INTRAVENOUS
Status: COMPLETED | OUTPATIENT
Start: 2021-04-16 | End: 2021-04-16

## 2021-04-16 RX ADMIN — GADOBUTROL 7.5 ML: 604.72 INJECTION INTRAVENOUS at 04:04

## 2021-04-20 ENCOUNTER — TELEPHONE (OUTPATIENT)
Dept: INTERNAL MEDICINE | Facility: CLINIC | Age: 61
End: 2021-04-20

## 2021-04-22 ENCOUNTER — TELEPHONE (OUTPATIENT)
Dept: INTERNAL MEDICINE | Facility: CLINIC | Age: 61
End: 2021-04-22

## 2021-04-25 ENCOUNTER — PATIENT MESSAGE (OUTPATIENT)
Dept: INTERNAL MEDICINE | Facility: CLINIC | Age: 61
End: 2021-04-25

## 2021-04-28 ENCOUNTER — PATIENT MESSAGE (OUTPATIENT)
Dept: RESEARCH | Facility: HOSPITAL | Age: 61
End: 2021-04-28

## 2021-04-28 DIAGNOSIS — E11.9 TYPE 2 DIABETES MELLITUS WITHOUT COMPLICATION, UNSPECIFIED WHETHER LONG TERM INSULIN USE: ICD-10-CM

## 2021-05-05 ENCOUNTER — OFFICE VISIT (OUTPATIENT)
Dept: INTERNAL MEDICINE | Facility: CLINIC | Age: 61
End: 2021-05-05
Payer: MEDICARE

## 2021-05-05 ENCOUNTER — TELEPHONE (OUTPATIENT)
Dept: INTERNAL MEDICINE | Facility: CLINIC | Age: 61
End: 2021-05-05

## 2021-05-05 VITALS — DIASTOLIC BLOOD PRESSURE: 74 MMHG | SYSTOLIC BLOOD PRESSURE: 138 MMHG

## 2021-05-05 DIAGNOSIS — R19.5 LOOSE STOOLS: ICD-10-CM

## 2021-05-05 DIAGNOSIS — I70.0 AORTIC ATHEROSCLEROSIS: ICD-10-CM

## 2021-05-05 DIAGNOSIS — R40.20 LOC (LOSS OF CONSCIOUSNESS): Primary | ICD-10-CM

## 2021-05-05 DIAGNOSIS — R25.1 TREMOR: ICD-10-CM

## 2021-05-05 DIAGNOSIS — I10 ESSENTIAL HYPERTENSION: ICD-10-CM

## 2021-05-05 DIAGNOSIS — E11.9 TYPE 2 DIABETES MELLITUS WITHOUT COMPLICATION, WITHOUT LONG-TERM CURRENT USE OF INSULIN: ICD-10-CM

## 2021-05-05 PROCEDURE — 99443 PR PHYSICIAN TELEPHONE EVALUATION 21-30 MIN: ICD-10-PCS | Mod: 95,,, | Performed by: INTERNAL MEDICINE

## 2021-05-05 PROCEDURE — 99443 PR PHYSICIAN TELEPHONE EVALUATION 21-30 MIN: CPT | Mod: 95,,, | Performed by: INTERNAL MEDICINE

## 2021-05-05 RX ORDER — ATORVASTATIN CALCIUM 20 MG/1
20 TABLET, FILM COATED ORAL DAILY
Qty: 90 TABLET | Refills: 3 | Status: SHIPPED | OUTPATIENT
Start: 2021-05-05 | End: 2021-05-13

## 2021-05-07 ENCOUNTER — LAB VISIT (OUTPATIENT)
Dept: LAB | Facility: HOSPITAL | Age: 61
End: 2021-05-07
Attending: PSYCHIATRY & NEUROLOGY
Payer: MEDICARE

## 2021-05-07 ENCOUNTER — OFFICE VISIT (OUTPATIENT)
Dept: NEUROLOGY | Facility: CLINIC | Age: 61
End: 2021-05-07
Payer: MEDICARE

## 2021-05-07 VITALS
SYSTOLIC BLOOD PRESSURE: 102 MMHG | WEIGHT: 168.19 LBS | DIASTOLIC BLOOD PRESSURE: 69 MMHG | HEART RATE: 94 BPM | HEIGHT: 65 IN | BODY MASS INDEX: 28.02 KG/M2

## 2021-05-07 DIAGNOSIS — R56.9 SEIZURE: ICD-10-CM

## 2021-05-07 DIAGNOSIS — R40.20 LOC (LOSS OF CONSCIOUSNESS): ICD-10-CM

## 2021-05-07 DIAGNOSIS — F32.A DEPRESSION, UNSPECIFIED DEPRESSION TYPE: ICD-10-CM

## 2021-05-07 DIAGNOSIS — G25.71 AKATHISIA: ICD-10-CM

## 2021-05-07 DIAGNOSIS — R55 SYNCOPE, UNSPECIFIED SYNCOPE TYPE: Primary | ICD-10-CM

## 2021-05-07 PROCEDURE — 99205 OFFICE O/P NEW HI 60 MIN: CPT | Mod: S$PBB,,, | Performed by: PSYCHIATRY & NEUROLOGY

## 2021-05-07 PROCEDURE — 99205 PR OFFICE/OUTPT VISIT, NEW, LEVL V, 60-74 MIN: ICD-10-PCS | Mod: S$PBB,,, | Performed by: PSYCHIATRY & NEUROLOGY

## 2021-05-07 PROCEDURE — 80177 DRUG SCRN QUAN LEVETIRACETAM: CPT | Performed by: PSYCHIATRY & NEUROLOGY

## 2021-05-07 PROCEDURE — 99999 PR PBB SHADOW E&M-EST. PATIENT-LVL V: CPT | Mod: PBBFAC,,, | Performed by: PSYCHIATRY & NEUROLOGY

## 2021-05-07 PROCEDURE — 99215 OFFICE O/P EST HI 40 MIN: CPT | Mod: PBBFAC,PN | Performed by: PSYCHIATRY & NEUROLOGY

## 2021-05-07 PROCEDURE — 36415 COLL VENOUS BLD VENIPUNCTURE: CPT | Performed by: PSYCHIATRY & NEUROLOGY

## 2021-05-07 PROCEDURE — 99999 PR PBB SHADOW E&M-EST. PATIENT-LVL V: ICD-10-PCS | Mod: PBBFAC,,, | Performed by: PSYCHIATRY & NEUROLOGY

## 2021-05-10 LAB — LEVETIRACETAM SERPL-MCNC: 17.6 UG/ML (ref 3–60)

## 2021-05-13 ENCOUNTER — OFFICE VISIT (OUTPATIENT)
Dept: CARDIOLOGY | Facility: CLINIC | Age: 61
End: 2021-05-13
Payer: MEDICARE

## 2021-05-13 VITALS
BODY MASS INDEX: 27.94 KG/M2 | HEIGHT: 65 IN | HEART RATE: 98 BPM | SYSTOLIC BLOOD PRESSURE: 131 MMHG | DIASTOLIC BLOOD PRESSURE: 79 MMHG | WEIGHT: 167.69 LBS

## 2021-05-13 DIAGNOSIS — I10 ESSENTIAL HYPERTENSION: ICD-10-CM

## 2021-05-13 DIAGNOSIS — Q24.9 CONGENITAL HEART DISEASE: Primary | ICD-10-CM

## 2021-05-13 DIAGNOSIS — R55 SYNCOPE, UNSPECIFIED SYNCOPE TYPE: ICD-10-CM

## 2021-05-13 PROCEDURE — 99213 OFFICE O/P EST LOW 20 MIN: CPT | Mod: PBBFAC,PN | Performed by: INTERNAL MEDICINE

## 2021-05-13 PROCEDURE — 99999 PR PBB SHADOW E&M-EST. PATIENT-LVL III: ICD-10-PCS | Mod: PBBFAC,,, | Performed by: INTERNAL MEDICINE

## 2021-05-13 PROCEDURE — 99999 PR PBB SHADOW E&M-EST. PATIENT-LVL III: CPT | Mod: PBBFAC,,, | Performed by: INTERNAL MEDICINE

## 2021-05-13 PROCEDURE — 99204 OFFICE O/P NEW MOD 45 MIN: CPT | Mod: S$PBB,,, | Performed by: INTERNAL MEDICINE

## 2021-05-13 PROCEDURE — 99204 PR OFFICE/OUTPT VISIT, NEW, LEVL IV, 45-59 MIN: ICD-10-PCS | Mod: S$PBB,,, | Performed by: INTERNAL MEDICINE

## 2021-05-21 ENCOUNTER — PATIENT OUTREACH (OUTPATIENT)
Dept: ADMINISTRATIVE | Facility: HOSPITAL | Age: 61
End: 2021-05-21

## 2021-05-21 ENCOUNTER — PATIENT MESSAGE (OUTPATIENT)
Dept: ADMINISTRATIVE | Facility: HOSPITAL | Age: 61
End: 2021-05-21

## 2021-05-21 DIAGNOSIS — E11.9 TYPE 2 DIABETES MELLITUS WITHOUT COMPLICATION, UNSPECIFIED WHETHER LONG TERM INSULIN USE: Primary | ICD-10-CM

## 2021-05-24 ENCOUNTER — TELEPHONE (OUTPATIENT)
Dept: INTERNAL MEDICINE | Facility: CLINIC | Age: 61
End: 2021-05-24

## 2021-05-28 ENCOUNTER — HOSPITAL ENCOUNTER (OUTPATIENT)
Dept: CARDIOLOGY | Facility: OTHER | Age: 61
Discharge: HOME | End: 2021-05-28
Attending: INTERNAL MEDICINE
Payer: MEDICARE

## 2021-05-28 VITALS
HEIGHT: 65 IN | HEART RATE: 98 BPM | DIASTOLIC BLOOD PRESSURE: 79 MMHG | SYSTOLIC BLOOD PRESSURE: 131 MMHG | WEIGHT: 167 LBS | BODY MASS INDEX: 27.82 KG/M2

## 2021-05-28 DIAGNOSIS — Q24.9 CONGENITAL HEART DISEASE: ICD-10-CM

## 2021-05-28 DIAGNOSIS — R55 SYNCOPE, UNSPECIFIED SYNCOPE TYPE: ICD-10-CM

## 2021-05-28 LAB
AV INDEX (PROSTH): 1.08
AV MEAN GRADIENT: 4 MMHG
AV PEAK GRADIENT: 8 MMHG
AV VALVE AREA: 1.96 CM2
AV VELOCITY RATIO: 0.83
BSA FOR ECHO PROCEDURE: 1.86 M2
CV ECHO LV RWT: 0.33 CM
DOP CALC AO PEAK VEL: 1.41 M/S
DOP CALC AO VTI: 21.91 CM
DOP CALC LVOT AREA: 1.8 CM2
DOP CALC LVOT DIAMETER: 1.52 CM
DOP CALC LVOT PEAK VEL: 1.17 M/S
DOP CALC LVOT STROKE VOLUME: 42.84 CM3
DOP CALCLVOT PEAK VEL VTI: 23.62 CM
E WAVE DECELERATION TIME: 223.64 MSEC
E/A RATIO: 0.79
E/E' RATIO: 7.7 M/S
ECHO LV POSTERIOR WALL: 0.61 CM (ref 0.6–1.1)
EJECTION FRACTION: 58 %
FRACTIONAL SHORTENING: 30 % (ref 28–44)
INTERVENTRICULAR SEPTUM: 0.58 CM (ref 0.6–1.1)
IVRT: 92.27 MSEC
LA MAJOR: 4.62 CM
LA MINOR: 4.96 CM
LA WIDTH: 3.8 CM
LEFT ATRIUM SIZE: 2.59 CM
LEFT ATRIUM VOLUME INDEX MOD: 24 ML/M2
LEFT ATRIUM VOLUME INDEX: 21.9 ML/M2
LEFT ATRIUM VOLUME MOD: 44 CM3
LEFT ATRIUM VOLUME: 40.02 CM3
LEFT INTERNAL DIMENSION IN SYSTOLE: 2.63 CM (ref 2.1–4)
LEFT VENTRICLE DIASTOLIC VOLUME INDEX: 32.84 ML/M2
LEFT VENTRICLE DIASTOLIC VOLUME: 60.09 ML
LEFT VENTRICLE MASS INDEX: 31 G/M2
LEFT VENTRICLE SYSTOLIC VOLUME INDEX: 13.8 ML/M2
LEFT VENTRICLE SYSTOLIC VOLUME: 25.22 ML
LEFT VENTRICULAR INTERNAL DIMENSION IN DIASTOLE: 3.75 CM (ref 3.5–6)
LEFT VENTRICULAR MASS: 57.03 G
LV LATERAL E/E' RATIO: 6.42 M/S
LV SEPTAL E/E' RATIO: 9.63 M/S
MV MEAN GRADIENT: 1 MMHG
MV PEAK A VEL: 0.97 M/S
MV PEAK E VEL: 0.77 M/S
MV PEAK GRADIENT: 3 MMHG
MV STENOSIS PRESSURE HALF TIME: 64.85 MS
MV VALVE AREA P 1/2 METHOD: 3.39 CM2
PISA TR MAX VEL: 3.31 M/S
PULM VEIN S/D RATIO: 1.21
PV PEAK D VEL: 0.38 M/S
PV PEAK S VEL: 0.46 M/S
PV PEAK VELOCITY: 1.72 CM/S
RA MAJOR: 3.86 CM
SINUS: 2.55 CM
TDI LATERAL: 0.12 M/S
TDI SEPTAL: 0.08 M/S
TDI: 0.1 M/S
TR MAX PG: 44 MMHG
TRICUSPID ANNULAR PLANE SYSTOLIC EXCURSION: 1.33 CM

## 2021-05-28 PROCEDURE — 93306 TTE W/DOPPLER COMPLETE: CPT | Mod: 26,,, | Performed by: INTERNAL MEDICINE

## 2021-05-28 PROCEDURE — 93306 TTE W/DOPPLER COMPLETE: CPT

## 2021-05-28 PROCEDURE — 93306 ECHO (CUPID ONLY): ICD-10-PCS | Mod: 26,,, | Performed by: INTERNAL MEDICINE

## 2021-06-14 ENCOUNTER — PATIENT OUTREACH (OUTPATIENT)
Dept: ADMINISTRATIVE | Facility: HOSPITAL | Age: 61
End: 2021-06-14

## 2021-06-24 ENCOUNTER — OFFICE VISIT (OUTPATIENT)
Dept: INTERNAL MEDICINE | Facility: CLINIC | Age: 61
End: 2021-06-24
Payer: MEDICARE

## 2021-06-24 VITALS
DIASTOLIC BLOOD PRESSURE: 70 MMHG | WEIGHT: 170.19 LBS | HEART RATE: 77 BPM | OXYGEN SATURATION: 97 % | HEIGHT: 65 IN | BODY MASS INDEX: 28.36 KG/M2 | SYSTOLIC BLOOD PRESSURE: 124 MMHG

## 2021-06-24 DIAGNOSIS — E11.9 TYPE 2 DIABETES MELLITUS WITHOUT COMPLICATION, WITHOUT LONG-TERM CURRENT USE OF INSULIN: ICD-10-CM

## 2021-06-24 DIAGNOSIS — I10 ESSENTIAL HYPERTENSION: ICD-10-CM

## 2021-06-24 DIAGNOSIS — I70.0 AORTIC ATHEROSCLEROSIS: ICD-10-CM

## 2021-06-24 DIAGNOSIS — R25.1 TREMOR: ICD-10-CM

## 2021-06-24 DIAGNOSIS — F79 INTELLECTUAL DISABILITY: Primary | ICD-10-CM

## 2021-06-24 PROCEDURE — 99213 OFFICE O/P EST LOW 20 MIN: CPT | Mod: PBBFAC | Performed by: INTERNAL MEDICINE

## 2021-06-24 PROCEDURE — 99999 PR PBB SHADOW E&M-EST. PATIENT-LVL III: CPT | Mod: PBBFAC,,, | Performed by: INTERNAL MEDICINE

## 2021-06-24 PROCEDURE — 99215 OFFICE O/P EST HI 40 MIN: CPT | Mod: S$PBB,,, | Performed by: INTERNAL MEDICINE

## 2021-06-24 PROCEDURE — 99999 PR PBB SHADOW E&M-EST. PATIENT-LVL III: ICD-10-PCS | Mod: PBBFAC,,, | Performed by: INTERNAL MEDICINE

## 2021-06-24 PROCEDURE — 99215 PR OFFICE/OUTPT VISIT, EST, LEVL V, 40-54 MIN: ICD-10-PCS | Mod: S$PBB,,, | Performed by: INTERNAL MEDICINE

## 2021-07-02 ENCOUNTER — PES CALL (OUTPATIENT)
Dept: ADMINISTRATIVE | Facility: CLINIC | Age: 61
End: 2021-07-02

## 2021-07-07 ENCOUNTER — PATIENT MESSAGE (OUTPATIENT)
Dept: ADMINISTRATIVE | Facility: HOSPITAL | Age: 61
End: 2021-07-07

## 2021-07-22 ENCOUNTER — OFFICE VISIT (OUTPATIENT)
Dept: NEUROLOGY | Facility: CLINIC | Age: 61
End: 2021-07-22
Payer: MEDICARE

## 2021-07-22 VITALS
BODY MASS INDEX: 35.15 KG/M2 | SYSTOLIC BLOOD PRESSURE: 129 MMHG | DIASTOLIC BLOOD PRESSURE: 83 MMHG | HEIGHT: 66 IN | HEART RATE: 103 BPM | WEIGHT: 218.69 LBS

## 2021-07-22 DIAGNOSIS — G25.71 AKATHISIA: ICD-10-CM

## 2021-07-22 DIAGNOSIS — R56.9 SEIZURE: Primary | ICD-10-CM

## 2021-07-22 PROCEDURE — 99214 OFFICE O/P EST MOD 30 MIN: CPT | Mod: S$PBB,,, | Performed by: PSYCHIATRY & NEUROLOGY

## 2021-07-22 PROCEDURE — 99999 PR PBB SHADOW E&M-EST. PATIENT-LVL III: CPT | Mod: PBBFAC,,, | Performed by: PSYCHIATRY & NEUROLOGY

## 2021-07-22 PROCEDURE — 99214 PR OFFICE/OUTPT VISIT, EST, LEVL IV, 30-39 MIN: ICD-10-PCS | Mod: S$PBB,,, | Performed by: PSYCHIATRY & NEUROLOGY

## 2021-07-22 PROCEDURE — 99999 PR PBB SHADOW E&M-EST. PATIENT-LVL III: ICD-10-PCS | Mod: PBBFAC,,, | Performed by: PSYCHIATRY & NEUROLOGY

## 2021-07-22 PROCEDURE — 99213 OFFICE O/P EST LOW 20 MIN: CPT | Mod: PBBFAC,PN | Performed by: PSYCHIATRY & NEUROLOGY

## 2021-07-22 RX ORDER — LEVETIRACETAM 750 MG/1
TABLET ORAL
Qty: 30 TABLET | Refills: 5 | Status: SHIPPED | OUTPATIENT
Start: 2021-07-22 | End: 2022-04-15 | Stop reason: SDUPTHER

## 2021-07-29 ENCOUNTER — TELEPHONE (OUTPATIENT)
Dept: OPTOMETRY | Facility: CLINIC | Age: 61
End: 2021-07-29

## 2021-10-04 ENCOUNTER — PATIENT MESSAGE (OUTPATIENT)
Dept: ADMINISTRATIVE | Facility: HOSPITAL | Age: 61
End: 2021-10-04

## 2021-10-04 ENCOUNTER — OFFICE VISIT (OUTPATIENT)
Dept: INTERNAL MEDICINE | Facility: CLINIC | Age: 61
End: 2021-10-04
Payer: MEDICARE

## 2021-10-04 VITALS
SYSTOLIC BLOOD PRESSURE: 138 MMHG | HEART RATE: 94 BPM | HEIGHT: 62 IN | OXYGEN SATURATION: 97 % | WEIGHT: 178.38 LBS | DIASTOLIC BLOOD PRESSURE: 74 MMHG | BODY MASS INDEX: 32.82 KG/M2

## 2021-10-04 DIAGNOSIS — K63.5 POLYP OF COLON, UNSPECIFIED PART OF COLON, UNSPECIFIED TYPE: ICD-10-CM

## 2021-10-04 DIAGNOSIS — E11.9 TYPE 2 DIABETES MELLITUS WITHOUT COMPLICATION, WITHOUT LONG-TERM CURRENT USE OF INSULIN: ICD-10-CM

## 2021-10-04 DIAGNOSIS — F79 INTELLECTUAL DISABILITY: ICD-10-CM

## 2021-10-04 DIAGNOSIS — I10 ESSENTIAL HYPERTENSION: ICD-10-CM

## 2021-10-04 DIAGNOSIS — L30.4 INTERTRIGO: ICD-10-CM

## 2021-10-04 DIAGNOSIS — K80.80 BILIARY CALCULUS OF OTHER SITE WITHOUT OBSTRUCTION: ICD-10-CM

## 2021-10-04 DIAGNOSIS — R19.5 LOOSE STOOLS: ICD-10-CM

## 2021-10-04 DIAGNOSIS — R53.83 FATIGUE, UNSPECIFIED TYPE: ICD-10-CM

## 2021-10-04 DIAGNOSIS — Z00.00 ENCOUNTER FOR PREVENTIVE HEALTH EXAMINATION: Primary | ICD-10-CM

## 2021-10-04 DIAGNOSIS — D64.9 NORMOCYTIC ANEMIA: ICD-10-CM

## 2021-10-04 DIAGNOSIS — E53.8 CYANOCOBALAMIN DEFICIENCY: ICD-10-CM

## 2021-10-04 DIAGNOSIS — K64.8 INTERNAL HEMORRHOIDS: ICD-10-CM

## 2021-10-04 DIAGNOSIS — J30.89 SEASONAL ALLERGIC RHINITIS DUE TO OTHER ALLERGIC TRIGGER: ICD-10-CM

## 2021-10-04 DIAGNOSIS — F33.0 MILD EPISODE OF RECURRENT MAJOR DEPRESSIVE DISORDER: ICD-10-CM

## 2021-10-04 DIAGNOSIS — R39.81 FUNCTIONAL INCONTINENCE: ICD-10-CM

## 2021-10-04 DIAGNOSIS — H91.90 HEARING LOSS, UNSPECIFIED HEARING LOSS TYPE, UNSPECIFIED LATERALITY: ICD-10-CM

## 2021-10-04 DIAGNOSIS — I70.0 AORTIC ATHEROSCLEROSIS: ICD-10-CM

## 2021-10-04 PROCEDURE — 99999 PR PBB SHADOW E&M-EST. PATIENT-LVL IV: ICD-10-PCS | Mod: PBBFAC,,, | Performed by: NURSE PRACTITIONER

## 2021-10-04 PROCEDURE — 99214 OFFICE O/P EST MOD 30 MIN: CPT | Mod: PBBFAC | Performed by: NURSE PRACTITIONER

## 2021-10-04 PROCEDURE — G0439 PR MEDICARE ANNUAL WELLNESS SUBSEQUENT VISIT: ICD-10-PCS | Mod: ,,, | Performed by: NURSE PRACTITIONER

## 2021-10-04 PROCEDURE — G0439 PPPS, SUBSEQ VISIT: HCPCS | Mod: ,,, | Performed by: NURSE PRACTITIONER

## 2021-10-04 PROCEDURE — 99999 PR PBB SHADOW E&M-EST. PATIENT-LVL IV: CPT | Mod: PBBFAC,,, | Performed by: NURSE PRACTITIONER

## 2021-10-07 ENCOUNTER — OFFICE VISIT (OUTPATIENT)
Dept: PODIATRY | Facility: CLINIC | Age: 61
End: 2021-10-07
Payer: MEDICARE

## 2021-10-07 VITALS
DIASTOLIC BLOOD PRESSURE: 81 MMHG | BODY MASS INDEX: 32.76 KG/M2 | HEART RATE: 84 BPM | HEIGHT: 62 IN | WEIGHT: 178 LBS | SYSTOLIC BLOOD PRESSURE: 169 MMHG

## 2021-10-07 DIAGNOSIS — M20.42 HAMMER TOE OF LEFT FOOT: Primary | ICD-10-CM

## 2021-10-07 DIAGNOSIS — E11.9 TYPE 2 DIABETES MELLITUS WITHOUT COMPLICATION, UNSPECIFIED WHETHER LONG TERM INSULIN USE: ICD-10-CM

## 2021-10-07 PROCEDURE — 99213 OFFICE O/P EST LOW 20 MIN: CPT | Mod: PBBFAC,PN | Performed by: PODIATRIST

## 2021-10-07 PROCEDURE — 99999 PR PBB SHADOW E&M-EST. PATIENT-LVL III: ICD-10-PCS | Mod: PBBFAC,,, | Performed by: PODIATRIST

## 2021-10-07 PROCEDURE — 99203 OFFICE O/P NEW LOW 30 MIN: CPT | Mod: S$PBB,,, | Performed by: PODIATRIST

## 2021-10-07 PROCEDURE — 99999 PR PBB SHADOW E&M-EST. PATIENT-LVL III: CPT | Mod: PBBFAC,,, | Performed by: PODIATRIST

## 2021-10-07 PROCEDURE — 99203 PR OFFICE/OUTPT VISIT, NEW, LEVL III, 30-44 MIN: ICD-10-PCS | Mod: S$PBB,,, | Performed by: PODIATRIST

## 2021-10-11 ENCOUNTER — OFFICE VISIT (OUTPATIENT)
Dept: OPTOMETRY | Facility: CLINIC | Age: 61
End: 2021-10-11
Payer: MEDICARE

## 2021-10-11 DIAGNOSIS — H52.203 MYOPIA WITH ASTIGMATISM AND PRESBYOPIA, BILATERAL: ICD-10-CM

## 2021-10-11 DIAGNOSIS — E11.36 TYPE 2 DIABETES MELLITUS WITH CATARACT: ICD-10-CM

## 2021-10-11 DIAGNOSIS — H52.4 MYOPIA WITH ASTIGMATISM AND PRESBYOPIA, BILATERAL: ICD-10-CM

## 2021-10-11 DIAGNOSIS — E11.9 TYPE 2 DIABETES MELLITUS WITHOUT RETINOPATHY: Primary | ICD-10-CM

## 2021-10-11 DIAGNOSIS — H25.13 SENILE NUCLEAR SCLEROSIS, BILATERAL: ICD-10-CM

## 2021-10-11 DIAGNOSIS — H40.053 OCULAR HYPERTENSION, BILATERAL: ICD-10-CM

## 2021-10-11 DIAGNOSIS — H52.13 MYOPIA WITH ASTIGMATISM AND PRESBYOPIA, BILATERAL: ICD-10-CM

## 2021-10-11 DIAGNOSIS — E11.9 TYPE 2 DIABETES MELLITUS WITHOUT COMPLICATION, UNSPECIFIED WHETHER LONG TERM INSULIN USE: ICD-10-CM

## 2021-10-11 PROCEDURE — 92004 PR EYE EXAM, NEW PATIENT,COMPREHESV: ICD-10-PCS | Mod: S$PBB,,, | Performed by: OPTOMETRIST

## 2021-10-11 PROCEDURE — 99999 PR PBB SHADOW E&M-EST. PATIENT-LVL III: ICD-10-PCS | Mod: PBBFAC,,, | Performed by: OPTOMETRIST

## 2021-10-11 PROCEDURE — 99213 OFFICE O/P EST LOW 20 MIN: CPT | Mod: PBBFAC | Performed by: OPTOMETRIST

## 2021-10-11 PROCEDURE — 92015 PR REFRACTION: ICD-10-PCS | Mod: ,,, | Performed by: OPTOMETRIST

## 2021-10-11 PROCEDURE — 99999 PR PBB SHADOW E&M-EST. PATIENT-LVL III: CPT | Mod: PBBFAC,,, | Performed by: OPTOMETRIST

## 2021-10-11 PROCEDURE — 92015 DETERMINE REFRACTIVE STATE: CPT | Mod: ,,, | Performed by: OPTOMETRIST

## 2021-10-11 PROCEDURE — 92004 COMPRE OPH EXAM NEW PT 1/>: CPT | Mod: S$PBB,,, | Performed by: OPTOMETRIST

## 2021-11-02 DIAGNOSIS — I10 ESSENTIAL HYPERTENSION: ICD-10-CM

## 2021-11-02 DIAGNOSIS — E53.8 CYANOCOBALAMIN DEFICIENCY: ICD-10-CM

## 2021-11-02 DIAGNOSIS — J30.89 SEASONAL ALLERGIC RHINITIS DUE TO OTHER ALLERGIC TRIGGER: ICD-10-CM

## 2021-11-04 RX ORDER — LANOLIN ALCOHOL/MO/W.PET/CERES
1000 CREAM (GRAM) TOPICAL DAILY
Qty: 90 TABLET | Refills: 3 | Status: SHIPPED | OUTPATIENT
Start: 2021-11-04 | End: 2022-05-19

## 2021-11-04 RX ORDER — MINERAL OIL
180 ENEMA (ML) RECTAL DAILY
Qty: 30 TABLET | Refills: 11 | Status: SHIPPED | OUTPATIENT
Start: 2021-11-04

## 2021-11-04 RX ORDER — AMLODIPINE BESYLATE 10 MG/1
10 TABLET ORAL DAILY
Qty: 90 TABLET | Refills: 3 | Status: SHIPPED | OUTPATIENT
Start: 2021-11-04 | End: 2022-12-02

## 2021-11-14 ENCOUNTER — PATIENT OUTREACH (OUTPATIENT)
Dept: ADMINISTRATIVE | Facility: OTHER | Age: 61
End: 2021-11-14
Payer: MEDICARE

## 2021-11-15 ENCOUNTER — TELEPHONE (OUTPATIENT)
Dept: OPTOMETRY | Facility: CLINIC | Age: 61
End: 2021-11-15
Payer: MEDICARE

## 2021-11-30 ENCOUNTER — OFFICE VISIT (OUTPATIENT)
Dept: OTOLARYNGOLOGY | Facility: CLINIC | Age: 61
End: 2021-11-30
Payer: MEDICARE

## 2021-11-30 ENCOUNTER — CLINICAL SUPPORT (OUTPATIENT)
Dept: AUDIOLOGY | Facility: CLINIC | Age: 61
End: 2021-11-30
Payer: MEDICARE

## 2021-11-30 VITALS
WEIGHT: 176.56 LBS | SYSTOLIC BLOOD PRESSURE: 130 MMHG | BODY MASS INDEX: 32.3 KG/M2 | HEART RATE: 78 BPM | DIASTOLIC BLOOD PRESSURE: 77 MMHG

## 2021-11-30 DIAGNOSIS — H90.3 SENSORINEURAL HEARING LOSS, BILATERAL: Primary | ICD-10-CM

## 2021-11-30 DIAGNOSIS — H91.90 HEARING LOSS, UNSPECIFIED HEARING LOSS TYPE, UNSPECIFIED LATERALITY: ICD-10-CM

## 2021-11-30 DIAGNOSIS — H90.3 SENSORINEURAL HEARING LOSS (SNHL) OF BOTH EARS: Primary | ICD-10-CM

## 2021-11-30 PROCEDURE — 92567 TYMPANOMETRY: CPT | Mod: PBBFAC | Performed by: AUDIOLOGIST

## 2021-11-30 PROCEDURE — 99203 PR OFFICE/OUTPT VISIT, NEW, LEVL III, 30-44 MIN: ICD-10-PCS | Mod: S$PBB,,, | Performed by: OTOLARYNGOLOGY

## 2021-11-30 PROCEDURE — 99211 OFF/OP EST MAY X REQ PHY/QHP: CPT | Mod: PBBFAC,27 | Performed by: AUDIOLOGIST

## 2021-11-30 PROCEDURE — 99999 PR PBB SHADOW E&M-EST. PATIENT-LVL III: CPT | Mod: PBBFAC,,, | Performed by: OTOLARYNGOLOGY

## 2021-11-30 PROCEDURE — 99999 PR PBB SHADOW E&M-EST. PATIENT-LVL I: CPT | Mod: PBBFAC,,, | Performed by: AUDIOLOGIST

## 2021-11-30 PROCEDURE — 99213 OFFICE O/P EST LOW 20 MIN: CPT | Mod: PBBFAC | Performed by: OTOLARYNGOLOGY

## 2021-11-30 PROCEDURE — 99203 OFFICE O/P NEW LOW 30 MIN: CPT | Mod: S$PBB,,, | Performed by: OTOLARYNGOLOGY

## 2021-11-30 PROCEDURE — 92557 COMPREHENSIVE HEARING TEST: CPT | Mod: PBBFAC | Performed by: AUDIOLOGIST

## 2021-11-30 PROCEDURE — 99999 PR PBB SHADOW E&M-EST. PATIENT-LVL III: ICD-10-PCS | Mod: PBBFAC,,, | Performed by: OTOLARYNGOLOGY

## 2021-11-30 PROCEDURE — 99999 PR PBB SHADOW E&M-EST. PATIENT-LVL I: ICD-10-PCS | Mod: PBBFAC,,, | Performed by: AUDIOLOGIST

## 2022-02-18 ENCOUNTER — LAB VISIT (OUTPATIENT)
Dept: LAB | Facility: OTHER | Age: 62
End: 2022-02-18
Attending: INTERNAL MEDICINE
Payer: MEDICARE

## 2022-02-18 DIAGNOSIS — E11.9 TYPE 2 DIABETES MELLITUS WITHOUT COMPLICATION, WITHOUT LONG-TERM CURRENT USE OF INSULIN: ICD-10-CM

## 2022-02-18 LAB
ESTIMATED AVG GLUCOSE: 146 MG/DL (ref 68–131)
HBA1C MFR BLD: 6.7 % (ref 4–5.6)

## 2022-02-18 PROCEDURE — 36415 COLL VENOUS BLD VENIPUNCTURE: CPT | Performed by: INTERNAL MEDICINE

## 2022-02-18 PROCEDURE — 83036 HEMOGLOBIN GLYCOSYLATED A1C: CPT | Performed by: INTERNAL MEDICINE

## 2022-02-22 PROBLEM — R56.9 SEIZURE: Status: ACTIVE | Noted: 2022-02-22

## 2022-02-22 PROBLEM — F33.0 MILD EPISODE OF RECURRENT MAJOR DEPRESSIVE DISORDER: Status: ACTIVE | Noted: 2022-02-22

## 2022-03-02 ENCOUNTER — TELEPHONE (OUTPATIENT)
Dept: INTERNAL MEDICINE | Facility: CLINIC | Age: 62
End: 2022-03-02
Payer: MEDICARE

## 2022-03-02 NOTE — TELEPHONE ENCOUNTER
----- Message from Leslee Conteh sent at 3/2/2022 12:55 PM CST -----  Contact: Melinda Yang (Relative)  Type: Call Back    Who called: Melinda Yang (Relative)    What is the request in detail: Melinda Yang (Relative)   is requesting a call back. She states that she dropped of a 90L form to be completed a while ago and she would like to know if it is still at the office or if her  picked it up. She states that she can not find the paperwork and would like to know if it is still there at the office. Please advise.     Can the clinic reply by [facility]? No    Would the patient rather a call back or a response via [facility]? Call back     Best call back number: [phone]    Additional Information:

## 2022-03-02 NOTE — TELEPHONE ENCOUNTER
There's no documentation that we received a 90 L form recently.  Please have them send it to us. Thanks!

## 2022-03-03 NOTE — TELEPHONE ENCOUNTER
Spoke to ms. Trey relative and informed her per Dr. Weir that there's no documentation that we received a 90 L form recently.  Patient relative states that she will see if she can get another form and bring it into the office.  Instructed patient relative to give us a call at the office for any questions or concerns.

## 2022-03-04 ENCOUNTER — TELEPHONE (OUTPATIENT)
Dept: INTERNAL MEDICINE | Facility: CLINIC | Age: 62
End: 2022-03-04

## 2022-03-22 ENCOUNTER — TELEPHONE (OUTPATIENT)
Dept: INTERNAL MEDICINE | Facility: CLINIC | Age: 62
End: 2022-03-22
Payer: MEDICARE

## 2022-03-22 NOTE — TELEPHONE ENCOUNTER
Returned call and Melinda pt's sister-in-law answered ([phone])  and states she's the person who called. She would like to know if the paperwork they dropped off is complete and can they pick it up?

## 2022-03-22 NOTE — TELEPHONE ENCOUNTER
----- Message from Sherrie Bah MA sent at 3/22/2022  8:31 AM CDT -----  Regarding: pt resquesting a call back  Name of Who is Calling: NOVA MARCUS [[id number]]           What is the request in detail: pt requesting a call back needs to speak with someone in office about paperwork that was dropped off            Can the clinic reply by [facility]: n           What Number to Call Back if not in [facility]: [phone]

## 2022-03-23 NOTE — TELEPHONE ENCOUNTER
----- Message from Ileana Marshall sent at 3/23/2022  3:51 PM CDT -----   Type:  Patient Returning Call    Who Called:Astrid (guardian)    Who Left Message for Patient:Lori Weir MD     Does the patient know what this is regarding?:    Best Call Back Number:[phone]    Additional Information:

## 2022-03-23 NOTE — TELEPHONE ENCOUNTER
How Severe Is Your Acne?: mild Signed 90L form.  It's on Wing's desk. Please add my stamp. Please return to the patient's family.  Thank you!   Is This A New Presentation, Or A Follow-Up?: Acne

## 2022-04-15 RX ORDER — LEVETIRACETAM 750 MG/1
TABLET ORAL
Qty: 60 TABLET | Refills: 1 | Status: SHIPPED | OUTPATIENT
Start: 2022-04-15 | End: 2022-06-05

## 2022-04-22 ENCOUNTER — PATIENT MESSAGE (OUTPATIENT)
Dept: ADMINISTRATIVE | Facility: HOSPITAL | Age: 62
End: 2022-04-22
Payer: MEDICARE

## 2022-04-22 ENCOUNTER — PATIENT OUTREACH (OUTPATIENT)
Dept: ADMINISTRATIVE | Facility: HOSPITAL | Age: 62
End: 2022-04-22
Payer: MEDICARE

## 2022-04-22 DIAGNOSIS — E11.9 DIABETES MELLITUS WITHOUT COMPLICATION: Primary | ICD-10-CM

## 2022-04-22 DIAGNOSIS — Z12.31 ENCOUNTER FOR SCREENING MAMMOGRAM FOR BREAST CANCER: ICD-10-CM

## 2022-05-19 ENCOUNTER — LAB VISIT (OUTPATIENT)
Dept: LAB | Facility: OTHER | Age: 62
End: 2022-05-19
Attending: INTERNAL MEDICINE
Payer: MEDICARE

## 2022-05-19 ENCOUNTER — OFFICE VISIT (OUTPATIENT)
Dept: INTERNAL MEDICINE | Facility: CLINIC | Age: 62
End: 2022-05-19
Payer: MEDICARE

## 2022-05-19 VITALS
WEIGHT: 169.56 LBS | HEART RATE: 90 BPM | BODY MASS INDEX: 31.2 KG/M2 | DIASTOLIC BLOOD PRESSURE: 80 MMHG | OXYGEN SATURATION: 99 % | HEIGHT: 62 IN | SYSTOLIC BLOOD PRESSURE: 120 MMHG

## 2022-05-19 DIAGNOSIS — E53.8 CYANOCOBALAMIN DEFICIENCY: ICD-10-CM

## 2022-05-19 DIAGNOSIS — E11.36 TYPE 2 DIABETES MELLITUS WITH CATARACT: ICD-10-CM

## 2022-05-19 DIAGNOSIS — I70.0 AORTIC ATHEROSCLEROSIS: ICD-10-CM

## 2022-05-19 DIAGNOSIS — F33.0 MILD EPISODE OF RECURRENT MAJOR DEPRESSIVE DISORDER: ICD-10-CM

## 2022-05-19 DIAGNOSIS — D64.9 NORMOCYTIC ANEMIA: ICD-10-CM

## 2022-05-19 DIAGNOSIS — I10 ESSENTIAL HYPERTENSION: Primary | ICD-10-CM

## 2022-05-19 DIAGNOSIS — Z00.00 ANNUAL PHYSICAL EXAM: ICD-10-CM

## 2022-05-19 DIAGNOSIS — R56.9 SEIZURE: ICD-10-CM

## 2022-05-19 DIAGNOSIS — Z23 HIGH PRIORITY FOR COVID-19 VACCINATION: ICD-10-CM

## 2022-05-19 DIAGNOSIS — I10 ESSENTIAL HYPERTENSION: ICD-10-CM

## 2022-05-19 LAB
ALBUMIN SERPL BCP-MCNC: 3.6 G/DL (ref 3.5–5.2)
ALP SERPL-CCNC: 114 U/L (ref 55–135)
ALT SERPL W/O P-5'-P-CCNC: 11 U/L (ref 10–44)
ANION GAP SERPL CALC-SCNC: 11 MMOL/L (ref 8–16)
AST SERPL-CCNC: 16 U/L (ref 10–40)
BASOPHILS # BLD AUTO: 0.05 K/UL (ref 0–0.2)
BASOPHILS NFR BLD: 1 % (ref 0–1.9)
BILIRUB SERPL-MCNC: 0.7 MG/DL (ref 0.1–1)
BUN SERPL-MCNC: 18 MG/DL (ref 8–23)
CALCIUM SERPL-MCNC: 9.6 MG/DL (ref 8.7–10.5)
CHLORIDE SERPL-SCNC: 105 MMOL/L (ref 95–110)
CHOLEST SERPL-MCNC: 221 MG/DL (ref 120–199)
CHOLEST/HDLC SERPL: 3.7 {RATIO} (ref 2–5)
CO2 SERPL-SCNC: 24 MMOL/L (ref 23–29)
CREAT SERPL-MCNC: 1.1 MG/DL (ref 0.5–1.4)
DIFFERENTIAL METHOD: ABNORMAL
EOSINOPHIL # BLD AUTO: 0.3 K/UL (ref 0–0.5)
EOSINOPHIL NFR BLD: 5 % (ref 0–8)
ERYTHROCYTE [DISTWIDTH] IN BLOOD BY AUTOMATED COUNT: 13.9 % (ref 11.5–14.5)
EST. GFR  (AFRICAN AMERICAN): >60 ML/MIN/1.73 M^2
EST. GFR  (NON AFRICAN AMERICAN): 54 ML/MIN/1.73 M^2
ESTIMATED AVG GLUCOSE: 169 MG/DL (ref 68–131)
ESTIMATED AVG GLUCOSE: 169 MG/DL (ref 68–131)
GLUCOSE SERPL-MCNC: 183 MG/DL (ref 70–110)
HBA1C MFR BLD: 7.5 % (ref 4–5.6)
HBA1C MFR BLD: 7.5 % (ref 4–5.6)
HCT VFR BLD AUTO: 37.1 % (ref 37–48.5)
HDLC SERPL-MCNC: 60 MG/DL (ref 40–75)
HDLC SERPL: 27.1 % (ref 20–50)
HGB BLD-MCNC: 11.9 G/DL (ref 12–16)
IMM GRANULOCYTES # BLD AUTO: 0.01 K/UL (ref 0–0.04)
IMM GRANULOCYTES NFR BLD AUTO: 0.2 % (ref 0–0.5)
LDLC SERPL CALC-MCNC: 142 MG/DL (ref 63–159)
LYMPHOCYTES # BLD AUTO: 1.5 K/UL (ref 1–4.8)
LYMPHOCYTES NFR BLD: 30.3 % (ref 18–48)
MCH RBC QN AUTO: 26.5 PG (ref 27–31)
MCHC RBC AUTO-ENTMCNC: 32.1 G/DL (ref 32–36)
MCV RBC AUTO: 83 FL (ref 82–98)
MONOCYTES # BLD AUTO: 0.5 K/UL (ref 0.3–1)
MONOCYTES NFR BLD: 10.5 % (ref 4–15)
NEUTROPHILS # BLD AUTO: 2.7 K/UL (ref 1.8–7.7)
NEUTROPHILS NFR BLD: 53 % (ref 38–73)
NONHDLC SERPL-MCNC: 161 MG/DL
NRBC BLD-RTO: 0 /100 WBC
PLATELET # BLD AUTO: 353 K/UL (ref 150–450)
PMV BLD AUTO: 9.8 FL (ref 9.2–12.9)
POTASSIUM SERPL-SCNC: 4.1 MMOL/L (ref 3.5–5.1)
PROT SERPL-MCNC: 8.2 G/DL (ref 6–8.4)
RBC # BLD AUTO: 4.49 M/UL (ref 4–5.4)
SODIUM SERPL-SCNC: 140 MMOL/L (ref 136–145)
TRIGL SERPL-MCNC: 95 MG/DL (ref 30–150)
VIT B12 SERPL-MCNC: 505 PG/ML (ref 210–950)
WBC # BLD AUTO: 5.05 K/UL (ref 3.9–12.7)

## 2022-05-19 PROCEDURE — 99999 PR PBB SHADOW E&M-EST. PATIENT-LVL III: CPT | Mod: PBBFAC,,, | Performed by: INTERNAL MEDICINE

## 2022-05-19 PROCEDURE — 99215 PR OFFICE/OUTPT VISIT, EST, LEVL V, 40-54 MIN: ICD-10-PCS | Mod: S$PBB,,, | Performed by: INTERNAL MEDICINE

## 2022-05-19 PROCEDURE — 83036 HEMOGLOBIN GLYCOSYLATED A1C: CPT | Performed by: INTERNAL MEDICINE

## 2022-05-19 PROCEDURE — 85025 COMPLETE CBC W/AUTO DIFF WBC: CPT | Performed by: INTERNAL MEDICINE

## 2022-05-19 PROCEDURE — 82607 VITAMIN B-12: CPT | Performed by: INTERNAL MEDICINE

## 2022-05-19 PROCEDURE — 36415 COLL VENOUS BLD VENIPUNCTURE: CPT | Performed by: INTERNAL MEDICINE

## 2022-05-19 PROCEDURE — 99215 OFFICE O/P EST HI 40 MIN: CPT | Mod: S$PBB,,, | Performed by: INTERNAL MEDICINE

## 2022-05-19 PROCEDURE — 80053 COMPREHEN METABOLIC PANEL: CPT | Performed by: INTERNAL MEDICINE

## 2022-05-19 PROCEDURE — 99999 PR PBB SHADOW E&M-EST. PATIENT-LVL III: ICD-10-PCS | Mod: PBBFAC,,, | Performed by: INTERNAL MEDICINE

## 2022-05-19 PROCEDURE — 99213 OFFICE O/P EST LOW 20 MIN: CPT | Mod: PBBFAC | Performed by: INTERNAL MEDICINE

## 2022-05-19 PROCEDURE — 80061 LIPID PANEL: CPT | Performed by: INTERNAL MEDICINE

## 2022-05-19 RX ORDER — ATORVASTATIN CALCIUM 40 MG/1
40 TABLET, FILM COATED ORAL DAILY
Qty: 90 TABLET | Refills: 3 | Status: SHIPPED | OUTPATIENT
Start: 2022-05-19 | End: 2023-06-07 | Stop reason: SDUPTHER

## 2022-05-19 NOTE — PROGRESS NOTES
Subjective:       Patient ID: Binta Yang is a 61 y.o. female who  has a past medical history of Cyanocobalamin deficiency, Depression, Diabetes mellitus, Essential hypertension, Heart disease, Hyperlipidemia, Memory loss, Mental retardation, Normocytic anemia, Pre-diabetes, Seizures, Stroke, and Syncope and collapse.    Chief Complaint: Hypertension and Depression    History was obtained from the patient and supplemented through chart review and from her sister-in-law (Irma).  -Saw optometry for DM eye exam.    History is limited due to MR. GANDHI as below.  Hasn't restarted adult day center yet d/t COVID.    HPI    They have no major complaints today.  Would like to switch Psychiatrists as below.     HTN:    MRI brain with small vessel chronic ischemic change, greater than expected for her age. Remote small infarct at the R frontal lobe and lacunar infarct.      BP is controlled on Norvasc 10.  Used to be on losartan 100. +DM, but microalbuminuria resolved.  Home BP:      Diet: Her brother and sister-in-law prepare meals. Was eating deli meats for lunch at the adult day care. Hard to control diet at the adult . Currently at home d/t pandemic. She prefers to eat food with soft texture, such as mashed potatoes. Admits to increased sweets.     Drinks: soft drinks. Increased hydration.     Exercise: none. Watches TV.     DM2 controlled.  Not on medications. Has chronic loose stools.  Diet as above.       No elevated microalbumin creatinine ratio.  Not an ACE/ARB.   Retinal exams: 10/2021  Foot exams:  10/2021  Lab Results   Component Value Date/Time    HGBA1C 6.7 (H) 02/18/2022 07:34 AM    HGBA1C 6.4 (H) 05/07/2021 01:25 PM    HGBA1C 6.8 (H) 04/06/2021 02:00 PM     Aortic atherosclerosis, HLD:    Was on Lipitor 20, but not lately and unsure why this was discontinued.  +DM. Diet as above.  MRI brain with history of small infarct.  Lab Results   Component Value Date    LDLCALC 133.0 05/07/2021     The  10-year ASCVD risk score (Industryariel HASKINS JrMoy, et al., 2013) is: 16.4%    Values used to calculate the score:      Age: 61 years      Sex: Female      Is Non- : Yes      Diabetic: Yes      Tobacco smoker: No      Systolic Blood Pressure: 120 mmHg      Is BP treated: Yes      HDL Cholesterol: 65 mg/dL      Total Cholesterol: 224 mg/dL    Vitamin B12 deficiency, normocytic anemia:    Colonoscopy 7/31/2019 with polyp.  Unsure when to repeat.    History of hysterectomy for hygienic reasons and self-care.  OBGYN for pelvic exam q2 years .    Has not been taking B12 supplement.  Lab Results   Component Value Date    IRON 75 04/06/2021    TIBC 317 04/06/2021    FERRITIN 76 04/06/2021     Lab Results   Component Value Date    KDCRMULU12 311 04/06/2021     Lab Results   Component Value Date    FOLATE 8.7 12/21/2018     Depression:    Her daughter passed away who had Down syndrome and MRMoy  Following with [facility], Psychiatry. Plan was to taper off Geodon QHS and continue Prozac 20. Stopped both meds as below due to tremor.  They would like to see a new psychiatrist.     Tremor:  Was having repetitive movements of her fingers/toes.  Saw Neurology.  Involuntary movements likely d/t Geodon. Unclear if she needs it. Referred to Psych for depression. If long wait, considering d/c Geodon->Ingrezza.   Stopped both Geodon, Fluoxetine.  Has less jerking/twitching.  No changed in mental status, mood.  Lab Results   Component Value Date    TSH 1.162 04/06/2021                Not addressed today.  LOC, ?  Seizure:  Occurred 3/2021.  See HPI 06/24/2021.  Concerning for seizure.  Has occurred before at the adult center. Occurs 2/year x 3 years. No triggers. This is the first time it occurred at home.  Will have UIC each time.  No hx sz. Has gone to the ED each time (no access to those records). Told that she was dehydrated.      Was seen in the ED 3/2021.  Troponin negative.  EKG without ST changes.  CT  "head without acute abnormality. Labs with mild anemia.      MRI brain with small vessel chronic ischemic change, greater than expected for her age. Remote small infarct at the R frontal lobe and lacunar infarct. No mass.  HTN has been controlled.     Cardiac w/u neg. Also saw Cardiology; more consistent with seizure than cardiac cause.  Surgery as a child most likely for a cardiac defect ASD vs VSD.   TTE 5-2021 with normal EF, grade 1 diastolic dysfunction.     TSH, HIV NR. Started Keppra. Saw Neuro. DDx syncope, sz. Try to video next episode. Cont Keppra, check level. Involuntary movements improved after stopping Geodon as above. Consider treating LISEHT.      Colon polyp:    C scope performed 07/31/2019.  No bx result.  Following with [facility].  Repeat scheduled for 5/2021.  Following with [facility].  Requested biopsy results from [facility] . F/u c [facility] for repeat cscope.    Cholelithiasis, loose stool:    Chronic loose stools. Denied abdominal pain , fever, change in appetite. Following with [facility].  Recommended acid reflux measures. Planning on repeat cscope.  Persistent.  Try probiotic or maybe metamucil to bulk up the stool. Planning on cscope with [facility].      Functional fecal and urinary incontinence:    Her sister-in-law thinks that she waits until the last minute and therefore can't get to the restroom in time and is "lazy to get up".  No urinary sx, abdominal pain.  No change in mental status, confusion.  Requires a lot of encouragement to do ADLs.  Hard to do timed voiding at the adult day care.  UCx NG.       Following with Urogynecology.  Unsure if urodynamics, cystoscopy will be of benefit.  Considering sacral neuro modulation.  Was on oxybutynin, Myrbetriq.  Persistent. No s/sx UTI. Family declined invasive procedures. F/u c Urogyn to discuss medical management.     AR:   + rhinorrhea, breathing more loudly.  No postnasal drip, fever.  Likely unable to use Flonase correctly. Cont " antihistamine PRN.      LIZAMA:  Chronic.  Unclear distance, but does occur when walking out of her home.  With exertion.  No CP , lightheadedness, dizziness, syncope. 4-5 PND for unknown reason; unclear of orthopnea.  No h/o tobacco.     H/o AR.  Family thinks that she always breathes loudly.  No rhinorrhea, postnasal drip, fever.  H/o LISETH.   D/t age, race, difficulty voicing sx, ordered Pharmacologic stress test. ED prompts. If neg, consider ENT for endoscopy or sleep referral for LISETH.      LISETH:     Confirmed on OSH PSG.  Is not on CPAP.     MR:    At baseline, she has a developmental delay/mild MR.  The patient's brother is her guardian.  She attends an adult  and lives with her brother and sister in law.  Her family prepares her meals.  She can bathe herself if her family members prepare the water.  She requires frequent reminders to shower and brush her teeth.  She can walk and transfer herself without difficulty. She has chronic facial asymmetry and her sister-in-law states her mouth is usually twisted towards the right.  The patient is usually quiet.  She can nod her head and answer in one-word responses; has appropriate responses.  Needs encouragement and assistance to perform ADLs. Goes to adult .  RTA form completed and returned to the patient. Handicap tag not appropriate in the absence of physical disabilty and would benefit from increased walking.     Intertrigo:  +functional fecal and urinary incontinence Switched to a more gentler soap; stopped bubble baths.  No change in lotions, detergents.  Improved for a month with topical ketoconazole, treatment of incontinence as below, but recurrent.  + pruritus.  Treated with Diflucan for yeast vaginitis.  Recurrent. Hygiene, incontinence contributing. A1C wnl . Keep area clean, dry. Rec breathable clothing, cotton. Topical Ketoconazole. F/u Urogyn for IC.      Review of Systems   Unable to perform ROS: Patient nonverbal   Constitutional: Negative  "for activity change and appetite change.   Respiratory: Negative for wheezing.    Cardiovascular: Negative for leg swelling.   Musculoskeletal: Negative for gait problem.   Psychiatric/Behavioral: Negative for confusion.       I personally reviewed Past Medical History, Past Surgical History, Social History, and Family History.    Objective:      Vitals:    05/19/22 0900   BP: 120/80   Pulse: 90   SpO2: 99%   Weight: 76.9 kg (169 lb 8.5 oz)   Height: 5' 2" (1.575 m)      Physical Exam  Constitutional:       General: She is not in acute distress.     Appearance: She is well-developed. She is not diaphoretic.   HENT:      Head: Normocephalic and atraumatic.      Nose: Nose normal.      Mouth/Throat:      Pharynx: No oropharyngeal exudate.   Eyes:      General: No scleral icterus.        Right eye: No discharge.         Left eye: No discharge.   Neck:      Thyroid: No thyromegaly.      Trachea: No tracheal deviation.   Cardiovascular:      Rate and Rhythm: Normal rate and regular rhythm.      Heart sounds: Normal heart sounds. No murmur heard.  Pulmonary:      Effort: Pulmonary effort is normal. No respiratory distress.      Breath sounds: Normal breath sounds. No wheezing.   Abdominal:      General: Bowel sounds are normal. There is no distension.      Palpations: Abdomen is soft.      Tenderness: There is no abdominal tenderness.   Musculoskeletal:         General: No deformity.      Cervical back: Neck supple.      Right lower leg: No edema.      Left lower leg: No edema.   Lymphadenopathy:      Cervical: No cervical adenopathy.   Skin:     General: Skin is warm and dry.      Findings: No erythema.   Neurological:      Mental Status: She is alert.      Cranial Nerves: No cranial nerve deficit.      Gait: Gait normal.      Comments: Tongue/mouth twisting. Answers yes/no to simple questions. Follows simple commands.   Psychiatric:         Behavior: Behavior normal.           Lab Results   Component Value Date    WBC " 8.23 03/15/2021    HGB 11.1 (L) 03/15/2021    HCT 35.2 (L) 03/15/2021     03/15/2021    CHOL 224 (H) 05/07/2021    TRIG 130 05/07/2021    HDL 65 05/07/2021    ALT 12 03/15/2021    AST 15 03/15/2021     03/15/2021    K 3.6 03/15/2021     03/15/2021    CREATININE 0.9 03/15/2021    BUN 12 03/15/2021    CO2 23 03/15/2021    TSH 1.162 04/06/2021    HGBA1C 6.7 (H) 02/18/2022       The 10-year ASCVD risk score (Marco HASKINS JrMoy, et al., 2013) is: 16.4%    Values used to calculate the score:      Age: 61 years      Sex: Female      Is Non- : Yes      Diabetic: Yes      Tobacco smoker: No      Systolic Blood Pressure: 120 mmHg      Is BP treated: Yes      HDL Cholesterol: 65 mg/dL      Total Cholesterol: 224 mg/dL    (Imaging have been independently reviewed)  MRI brain with small vessel chronic ischemic change, greater than expected for her age. Remote small infarct at the R frontal lobe and lacunar infarct. No mass.     Assessment:       1. Essential hypertension    2. Type 2 diabetes mellitus with cataract    3. Aortic atherosclerosis    4. Normocytic anemia    5. Cyanocobalamin deficiency    6. Mild episode of recurrent major depressive disorder    7. Seizure    8. Annual physical exam    9. High priority for COVID-19 vaccination          Plan:       Binta was seen today for hypertension and depression.    Diagnoses and all orders for this visit:    Essential hypertension  Comments:  BP controlled. Cont Norvasc 10. +DM, but microalbuminuria resolved. Monitor CMP.  Orders:  -     Comprehensive Metabolic Panel; Future    Type 2 diabetes mellitus with cataract  Comments:  A1C controlled s meds. Has difficulty with diet. Avoid Metformin d/t chronic loose stools. Monitor A1c today, q6 mo.   Orders:  -     Hemoglobin A1C; Future  -     Microalbumin/Creatinine Ratio, Urine; Future  -     Hemoglobin A1C; Future  -     Microalbumin/Creatinine Ratio, Urine; Future    Aortic  atherosclerosis  Comments:  FLP, ASCVD intermediate. +DM. Small remote infarct seen on MRI brain. Restart Lipitor at 38.  monitor FLP annually.  Orders:  -     atorvastatin (LIPITOR) 40 MG tablet; Take 1 tablet (40 mg total) by mouth once daily.  -     Lipid Panel; Future    Normocytic anemia  Comments:  UTD cscope. Monitor CBC.  Orders:  -     CBC Auto Differential; Future  -     Vitamin B12; Future    Cyanocobalamin deficiency  Comments:  Off B12 supplement; check level. UTD C scope. Iron panel wnl.  Monitor CBC.  Orders:  -     Vitamin B12; Future    Mild episode of recurrent major depressive disorder  Comments:  They would like to see a new psychiatrist. Advised to check with their insurance. Provided phone numbers of Psychiatrists.      Seizure  Comments:  Cont Keppra. Providing phone numbers for Psychiatry to optimize meds. F/u c Neuro.    Annual physical exam  -     Hemoglobin A1C; Future  -     Microalbumin/Creatinine Ratio, Urine; Future  -     CBC Auto Differential; Future  -     Comprehensive Metabolic Panel; Future  -     Lipid Panel; Future  -     Hemoglobin A1C; Future  -     Microalbumin/Creatinine Ratio, Urine; Future  -     Vitamin B12; Future    High priority for COVID-19 vaccination  Comments:  Qualifies for the 2nd COVID booster vaccine. Provided information to schedule COVID vaccine.         Side effects of medication(s) were discussed in detail and patient voiced understanding.  Patient will call back for any issues or complications.     I have spent a total of 40 minutes with the patient as well as reviewing the chart/medical record and placing orders on the day of the visit. Discussed medications, HLD, ASCVD, mental health, vaccines.     RTC in 6 month(s) or sooner PRN for DM, HTN, coordination of care with labs prior.

## 2022-05-23 ENCOUNTER — TELEPHONE (OUTPATIENT)
Dept: INTERNAL MEDICINE | Facility: CLINIC | Age: 62
End: 2022-05-23
Payer: MEDICARE

## 2022-05-23 NOTE — TELEPHONE ENCOUNTER
----- Message from Lori Weir MD sent at 5/19/2022  3:25 PM CDT -----  -Please schedule an appointment for DM, lab results.  Next available. In person or telephone. Thanks!            -------------------------  -Will discuss results in clinic.    DM2:  A1C now elevated.   Will schedule telephone visit with family. Has chronic loose stools, so will likely avoid Metformin. Consider Trulicity if she can tolerate injection or Jardiance d/t borderline low GFR. Will schedule phone visit to discuss tx options.  Might be d/t staying at home rather than adult day center. Has difficulty with diet. Will offer telephone visit with DM edu for her family. Plan on A1C in 3 mo with visit.    Aortic atherosclerosis  FLP borderline high. ASCVD intermediate. +DM. Small remote infarct seen on MRI brain. Restarting Lipitor at 38.  Monitor FLP annually.     Normocytic anemia  Stable. Following c [facility] for repeat cscope.     Cyanocobalamin deficiency  B12 wnl off B12 supplement. No supplement needed. UTD C scope. Iron panel wnl.      The 10-year ASCVD risk score (Marco DIVINE Jr., et al., 2013) is: 16.7%    Values used to calculate the score:      Age: 61 years      Sex: Female      Is Non- : Yes      Diabetic: Yes      Tobacco smoker: No      Systolic Blood Pressure: 120 mmHg      Is BP treated: Yes      HDL Cholesterol: 60 mg/dL      Total Cholesterol: 221 mg/dL

## 2022-05-23 NOTE — TELEPHONE ENCOUNTER
Called pt to sched next avail f/u w/ Dr. Weir   Called main number and someone answered and told me to call the mobile number  Called mobile number   LVM

## 2022-05-26 ENCOUNTER — OFFICE VISIT (OUTPATIENT)
Dept: INTERNAL MEDICINE | Facility: CLINIC | Age: 62
End: 2022-05-26
Payer: MEDICARE

## 2022-05-26 VITALS
DIASTOLIC BLOOD PRESSURE: 80 MMHG | OXYGEN SATURATION: 99 % | HEIGHT: 62 IN | WEIGHT: 171.75 LBS | SYSTOLIC BLOOD PRESSURE: 138 MMHG | HEART RATE: 96 BPM | BODY MASS INDEX: 31.6 KG/M2

## 2022-05-26 DIAGNOSIS — Z01.419 VISIT FOR PELVIC EXAM: ICD-10-CM

## 2022-05-26 DIAGNOSIS — K63.5 POLYP OF COLON, UNSPECIFIED PART OF COLON, UNSPECIFIED TYPE: ICD-10-CM

## 2022-05-26 DIAGNOSIS — D64.9 NORMOCYTIC ANEMIA: ICD-10-CM

## 2022-05-26 DIAGNOSIS — E11.65 TYPE 2 DIABETES MELLITUS WITH HYPERGLYCEMIA, WITHOUT LONG-TERM CURRENT USE OF INSULIN: Primary | ICD-10-CM

## 2022-05-26 DIAGNOSIS — I10 ESSENTIAL HYPERTENSION: ICD-10-CM

## 2022-05-26 PROCEDURE — 99999 PR PBB SHADOW E&M-EST. PATIENT-LVL V: ICD-10-PCS | Mod: PBBFAC,,, | Performed by: INTERNAL MEDICINE

## 2022-05-26 PROCEDURE — 99215 OFFICE O/P EST HI 40 MIN: CPT | Mod: S$PBB,,, | Performed by: INTERNAL MEDICINE

## 2022-05-26 PROCEDURE — 99215 PR OFFICE/OUTPT VISIT, EST, LEVL V, 40-54 MIN: ICD-10-PCS | Mod: S$PBB,,, | Performed by: INTERNAL MEDICINE

## 2022-05-26 PROCEDURE — 99999 PR PBB SHADOW E&M-EST. PATIENT-LVL V: CPT | Mod: PBBFAC,,, | Performed by: INTERNAL MEDICINE

## 2022-05-26 PROCEDURE — 99215 OFFICE O/P EST HI 40 MIN: CPT | Mod: PBBFAC | Performed by: INTERNAL MEDICINE

## 2022-05-26 NOTE — PROGRESS NOTES
Subjective:       Patient ID: Binta Yang is a 61 y.o. female who  has a past medical history of Cyanocobalamin deficiency, Depression, Diabetes mellitus, Essential hypertension, Heart disease, Hyperlipidemia, Memory loss, Mental retardation, Normocytic anemia, Pre-diabetes, Seizures, Stroke, and Syncope and collapse.    Chief Complaint: Diabetes    History was obtained from the patient and supplemented through chart review and from her sister-in-law (Irma).  There were no ER or clinic visits since our last appointment.  -We are requesting records from [facility] for C scope.    History is limited due to MR. GANDHI as below.  Used to go to an adult day center but stopped d/t COVID.  Not restarting in the near future.    HPI    DM2 is uncontrolled.  Not on medications. Has chronic loose stools.    Diet: Was eating deli meats for lunch at the adult day care. Hard to control diet at the adult . Currently at home d/t pandemic. Her brother and sister-in-law prepare meals. She prefers to eat food with soft texture, such as mashed potatoes.     Breakfast: she adds sugar to cereal  Lunch: sandwich on white bread, cajun turkey. Doesn't like white bread. Chips.  Dinner: varies  Snacks: She sneaks in snacks, cookies, chips. Can finish an entire carton of ice cream.     Drinks: a lot of soft drinks. She didn't like sparkling water      Exercise: none. Watches TV.     No elevated microalbumin creatinine ratio.  Not an ACE/ARB.   Retinal exams: 10/2021  Foot exams:  10/2021  Lab Results   Component Value Date/Time    HGBA1C 7.5 (H) 05/19/2022 09:52 AM    HGBA1C 7.5 (H) 05/19/2022 09:52 AM    HGBA1C 6.7 (H) 02/18/2022 07:34 AM     HTN:    MRI brain with small vessel chronic ischemic change, greater than expected for her age. Remote small infarct at the R frontal lobe and lacunar infarct.      BP is controlled on Norvasc 10.  Used to be on losartan 100. +DM, but microalbuminuria resolved.  Home BP:      Vitamin B12  deficiency, normocytic anemia:    Colonoscopy 7/31/2019 with polyp.  Unsure when to repeat.    History of hysterectomy for hygienic reasons and self-care.  OBGYN for pelvic exam q2 years .      B12 wnl off B12 supplement. No supplement needed.   Lab Results   Component Value Date    IRON 75 04/06/2021    TIBC 317 04/06/2021    FERRITIN 76 04/06/2021     Lab Results   Component Value Date    YSXAZBKQ46 505 05/19/2022     Lab Results   Component Value Date    FOLATE 8.7 12/21/2018     Colon polyp:    C scope performed with [facility] on 07/31/2019.  No bx result.              Not addressed today.  Aortic atherosclerosis, HLD:    Was on Lipitor 20, but not lately and unsure why this was discontinued.  Restarted Lipitor 40 5-2022.  +DM. Diet as above.  MRI brain with history of small infarct.   FLP borderline high. ASCVD intermediate. +DM. Small remote infarct seen on MRI brain. Restarting Lipitor at 40.  Monitor FLP annually.  Lab Results   Component Value Date    LDLCALC 142.0 05/19/2022     The 10-year ASCVD risk score (Marco DIVINE Jr., et al., 2013) is: 23.2%    Values used to calculate the score:      Age: 61 years      Sex: Female      Is Non- : Yes      Diabetic: Yes      Tobacco smoker: No      Systolic Blood Pressure: 138 mmHg      Is BP treated: Yes      HDL Cholesterol: 60 mg/dL      Total Cholesterol: 221 mg/dL    LOC, ?  Seizure:  Occurred 3/2021.  See HPI 06/24/2021.  Concerning for seizure.  Has occurred before at the adult center. Occurs 2/year x 3 years. No triggers. This is the first time it occurred at home.  Will have UIC each time.  No hx sz. Has gone to the ED each time (no access to those records). Told that she was dehydrated.      Was seen in the ED 3/2021.  Troponin negative.  EKG without ST changes.  CT head without acute abnormality. Labs with mild anemia.      MRI brain with small vessel chronic ischemic change, greater than expected for her age. Remote small infarct  "at the R frontal lobe and lacunar infarct. No mass.  HTN has been controlled.     Cardiac w/u neg. Also saw Cardiology; more consistent with seizure than cardiac cause.  Surgery as a child most likely for a cardiac defect ASD vs VSD.   TTE 5-2021 with normal EF, grade 1 diastolic dysfunction.     TSH, HIV NR. Started Keppra. Saw Neuro. DDx syncope, sz. Try to video next episode. Cont Keppra, check level. Involuntary movements improved after stopping Geodon as above. Consider treating LISETH.      Cholelithiasis, loose stool:    Chronic loose stools. Denied abdominal pain , fever, change in appetite. Following with [facility].  Recommended acid reflux measures. Planning on repeat cscope.  Persistent.  Try probiotic or maybe metamucil to bulk up the stool. Planning on cscope with [facility].      Functional fecal and urinary incontinence:    Her sister-in-law thinks that she waits until the last minute and therefore can't get to the restroom in time and is "lazy to get up".  No urinary sx, abdominal pain.  No change in mental status, confusion.  Requires a lot of encouragement to do ADLs.  Hard to do timed voiding at the adult day care.  UCx NG.       Following with Urogynecology.  Unsure if urodynamics, cystoscopy will be of benefit.  Considering sacral neuro modulation.  Was on oxybutynin, Myrbetriq.  Persistent. No s/sx UTI. Family declined invasive procedures. F/u c Urogyn to discuss medical management.     AR:   + rhinorrhea, breathing more loudly.  No postnasal drip, fever.  Likely unable to use Flonase correctly. Cont antihistamine PRN.      LIZAMA:  Chronic.  Unclear distance, but does occur when walking out of her home.  With exertion.  No CP , lightheadedness, dizziness, syncope. 4-5 PND for unknown reason; unclear of orthopnea.  No h/o tobacco.     H/o AR.  Family thinks that she always breathes loudly.  No rhinorrhea, postnasal drip, fever.  H/o LISETH.   D/t age, race, difficulty voicing sx, ordered Pharmacologic " stress test. ED prompts. If neg, consider ENT for endoscopy or sleep referral for LISETH.      LISETH:     Confirmed on OSH PSG.  Is not on CPAP.     MR:    At baseline, she has a developmental delay/mild MR.  The patient's brother is her guardian.  She attends an adult  and lives with her brother and sister in law.  Her family prepares her meals.  She can bathe herself if her family members prepare the water.  She requires frequent reminders to shower and brush her teeth.  She can walk and transfer herself without difficulty. She has chronic facial asymmetry and her sister-in-law states her mouth is usually twisted towards the right.  The patient is usually quiet.  She can nod her head and answer in one-word responses; has appropriate responses.  Needs encouragement and assistance to perform ADLs. Goes to adult .  RTA form completed and returned to the patient. Handicap tag not appropriate in the absence of physical disabilty and would benefit from increased walking.     Depression:    Her daughter passed away who had Down syndrome and MR.  Following with [facility], Psychiatry. Plan was to taper off Geodon QHS and continue Prozac 20. Stopped both meds as below due to tremor.  They would like to see a new psychiatrist.   They would like to see a new psychiatrist. Advised to check with their insurance. Provided phone numbers of Psychiatrists.       Tremor:  Was having repetitive movements of her fingers/toes.  Saw Neurology.  Involuntary movements likely d/t Geodon. Unclear if she needs it. Referred to Psych for depression. If long wait, considering d/c Geodon->Ingrezza.   Stopped both Geodon, Fluoxetine.  Has less jerking/twitching.  No changed in mental status, mood.  Cont Keppra. Providing phone numbers for Psychiatry to optimize meds. F/u c Neuro.    Intertrigo:  +functional fecal and urinary incontinence Switched to a more gentler soap; stopped bubble baths.  No change in lotions, detergents.  " Improved for a month with topical ketoconazole, treatment of incontinence as below, but recurrent.  + pruritus.  Treated with Diflucan for yeast vaginitis.  Recurrent. Hygiene, incontinence contributing. A1C wnl . Keep area clean, dry. Rec breathable clothing, cotton. Topical Ketoconazole. F/u Urogyn for IC.      Review of Systems   Unable to perform ROS: Patient nonverbal   Constitutional: Negative for activity change and appetite change.   Musculoskeletal: Negative for gait problem.   Psychiatric/Behavioral: Negative for confusion.       I personally reviewed Past Medical History, Past Surgical History, Social History, and Family History.    Objective:      Vitals:    05/26/22 0810   BP: 138/80   Pulse: 96   SpO2: 99%   Weight: 77.9 kg (171 lb 11.8 oz)   Height: 5' 2" (1.575 m)      Physical Exam  Constitutional:       General: She is not in acute distress.     Appearance: She is well-developed. She is not diaphoretic.   HENT:      Head: Normocephalic and atraumatic.   Eyes:      General:         Right eye: No discharge.         Left eye: No discharge.   Pulmonary:      Effort: Pulmonary effort is normal. No respiratory distress.   Skin:     Coloration: Skin is not pale.      Findings: No erythema.   Neurological:      Mental Status: She is alert.   Psychiatric:         Behavior: Behavior normal.           Lab Results   Component Value Date    WBC 5.05 05/19/2022    HGB 11.9 (L) 05/19/2022    HCT 37.1 05/19/2022     05/19/2022    CHOL 221 (H) 05/19/2022    TRIG 95 05/19/2022    HDL 60 05/19/2022    ALT 11 05/19/2022    AST 16 05/19/2022     05/19/2022    K 4.1 05/19/2022     05/19/2022    CREATININE 1.1 05/19/2022    BUN 18 05/19/2022    CO2 24 05/19/2022    TSH 1.162 04/06/2021    HGBA1C 7.5 (H) 05/19/2022    HGBA1C 7.5 (H) 05/19/2022       The 10-year ASCVD risk score (Marco HASKINS Jr., et al., 2013) is: 23.2%    Values used to calculate the score:      Age: 61 years      Sex: Female      Is " Non- : Yes      Diabetic: Yes      Tobacco smoker: No      Systolic Blood Pressure: 138 mmHg      Is BP treated: Yes      HDL Cholesterol: 60 mg/dL      Total Cholesterol: 221 mg/dL    (Imaging have been independently reviewed)  MRI brain with small vessel chronic ischemic change, greater than expected for her age. Remote small infarct at the R frontal lobe and lacunar infarct. No mass.     Assessment:       1. Type 2 diabetes mellitus with hyperglycemia, without long-term current use of insulin    2. Essential hypertension    3. Normocytic anemia    4. Polyp of colon, unspecified part of colon, unspecified type    5. Visit for pelvic exam          Plan:       Binta was seen today for diabetes.    Diagnoses and all orders for this visit:    Type 2 diabetes mellitus with hyperglycemia, without long-term current use of insulin  Comments:  A1C elevated d/t change in diet. Try to decrease sugar, avoid snacks, soft drinks in the house. DM edu to help with diet. A1C in 3 mo. Addtl notes below.  Orders:  -     Hemoglobin A1C; Future  -     Microalbumin/Creatinine Ratio, Urine; Future  -     Ambulatory referral/consult to Diabetes Education; Future    Essential hypertension  Comments:  BP controlled. Cont Norvasc 10. +DM, but microalbuminuria resolved.    Normocytic anemia  Comments:  Stable.  Iron panel wnl. Unknown when to repeat cscope.  Request records from [facility].    Polyp of colon, unspecified part of colon, unspecified type  Comments:  As above.    Visit for pelvic exam  Comments:  Referred to OBGYN for routine pelvic exam.  Orders:  -     Ambulatory referral/consult to Obstetrics / Gynecology; Future         DM2:  Has chronic loose stools, so will likely avoid Metformin. Consider Trulicity if she can tolerate injection or Jardiance d/t borderline low GFR.     Side effects of medication(s) were discussed in detail and patient voiced understanding.  Patient will call back for any issues or  complications.     I have spent a total of 40 minutes with the patient as well as reviewing the chart/medical record and placing orders on the day of the visit. Discussed diet, DM, cancer screening, labs.     RTC in 3 month(s) or sooner PRN for DM, HTN with labs prior.

## 2022-05-27 ENCOUNTER — PATIENT OUTREACH (OUTPATIENT)
Dept: ADMINISTRATIVE | Facility: HOSPITAL | Age: 62
End: 2022-05-27
Payer: MEDICARE

## 2022-05-27 ENCOUNTER — TELEPHONE (OUTPATIENT)
Dept: INTERNAL MEDICINE | Facility: CLINIC | Age: 62
End: 2022-05-27

## 2022-05-27 NOTE — TELEPHONE ENCOUNTER
What is the reason for the referral to Ortho?  Please update the diagnosis and fill in the body part in the referral order.  Thanks!

## 2022-05-27 NOTE — TELEPHONE ENCOUNTER
Not able to LVM to discuss scheduling Diabetes Management appointment as there is no voice mail. Portal message will be sent.

## 2022-05-30 ENCOUNTER — PATIENT MESSAGE (OUTPATIENT)
Dept: ADMINISTRATIVE | Facility: HOSPITAL | Age: 62
End: 2022-05-30
Payer: MEDICARE

## 2022-05-30 ENCOUNTER — TELEPHONE (OUTPATIENT)
Dept: INTERNAL MEDICINE | Facility: CLINIC | Age: 62
End: 2022-05-30

## 2022-05-30 NOTE — TELEPHONE ENCOUNTER
"Called pt  And VM box was full  Called pt's second number to review the following info per Dr. Weir:                  "What is the reason for the referral to Ortho?  Please update the diagnosis and fill in the body part in the referral order.  Thanks!"                  Pt said she wasn't requesting an ortho appt but did need to sched w/ DM education.  Scheduled appt  Pt verbalized understanding and had no further concerns or questions.  Routing to provider as FYI in case the ortho note was supposed to be in a different pt's chart    "

## 2022-05-30 NOTE — TELEPHONE ENCOUNTER
Spoke to Ms. Yang brother and was told to call is wife to see why patient is needing Ortho, but no answer.  LVM to call the office

## 2022-05-31 ENCOUNTER — OFFICE VISIT (OUTPATIENT)
Dept: NEUROLOGY | Facility: CLINIC | Age: 62
End: 2022-05-31
Payer: MEDICARE

## 2022-05-31 ENCOUNTER — LAB VISIT (OUTPATIENT)
Dept: LAB | Facility: HOSPITAL | Age: 62
End: 2022-05-31
Attending: PSYCHIATRY & NEUROLOGY
Payer: MEDICARE

## 2022-05-31 VITALS
BODY MASS INDEX: 27.17 KG/M2 | SYSTOLIC BLOOD PRESSURE: 132 MMHG | HEIGHT: 66 IN | HEART RATE: 93 BPM | DIASTOLIC BLOOD PRESSURE: 79 MMHG | WEIGHT: 169.06 LBS

## 2022-05-31 DIAGNOSIS — R56.9 SEIZURE: ICD-10-CM

## 2022-05-31 DIAGNOSIS — G25.71 AKATHISIA: ICD-10-CM

## 2022-05-31 DIAGNOSIS — R56.9 SEIZURE: Primary | ICD-10-CM

## 2022-05-31 PROCEDURE — 80177 DRUG SCRN QUAN LEVETIRACETAM: CPT | Performed by: PSYCHIATRY & NEUROLOGY

## 2022-05-31 PROCEDURE — 99214 PR OFFICE/OUTPT VISIT, EST, LEVL IV, 30-39 MIN: ICD-10-PCS | Mod: S$PBB,,, | Performed by: PSYCHIATRY & NEUROLOGY

## 2022-05-31 PROCEDURE — 36415 COLL VENOUS BLD VENIPUNCTURE: CPT | Performed by: PSYCHIATRY & NEUROLOGY

## 2022-05-31 PROCEDURE — 99213 OFFICE O/P EST LOW 20 MIN: CPT | Mod: PBBFAC,PN | Performed by: PSYCHIATRY & NEUROLOGY

## 2022-05-31 PROCEDURE — 99999 PR PBB SHADOW E&M-EST. PATIENT-LVL III: CPT | Mod: PBBFAC,,, | Performed by: PSYCHIATRY & NEUROLOGY

## 2022-05-31 PROCEDURE — 99999 PR PBB SHADOW E&M-EST. PATIENT-LVL III: ICD-10-PCS | Mod: PBBFAC,,, | Performed by: PSYCHIATRY & NEUROLOGY

## 2022-05-31 PROCEDURE — 99214 OFFICE O/P EST MOD 30 MIN: CPT | Mod: S$PBB,,, | Performed by: PSYCHIATRY & NEUROLOGY

## 2022-05-31 NOTE — PROGRESS NOTES
"Subjective:       Patient ID: Binta Yang is a 61 y.o. female.    Chief Complaint: Seizures      HPI  Past Medical History:   Diagnosis Date    Cyanocobalamin deficiency     Depression     Diabetes mellitus     Essential hypertension     Heart disease     childhood surgery    Hyperlipidemia     Memory loss     Mental retardation     Normocytic anemia     Pre-diabetes     Seizures     3-4 per year    Stroke     mild    Syncope and collapse       Past Surgical History:   Procedure Laterality Date    CARDIAC SURGERY      "hole in the heart", during childhood     SECTION      x2    CORONARY ARTERY BYPASS GRAFT      HYSTERECTOMY      difficulties with personal hygiene        Current Outpatient Medications:     amLODIPine (NORVASC) 10 MG tablet, Take 1 tablet (10 mg total) by mouth once daily., Disp: 90 tablet, Rfl: 3    atorvastatin (LIPITOR) 40 MG tablet, Take 1 tablet (40 mg total) by mouth once daily., Disp: 90 tablet, Rfl: 3    fexofenadine (ALLEGRA) 180 MG tablet, Take 1 tablet (180 mg total) by mouth once daily., Disp: 30 tablet, Rfl: 11    levETIRAcetam (KEPPRA) 750 MG Tab, One po q 12 hours, Disp: 60 tablet, Rfl: 1   Review of patient's allergies indicates:  No Known Allergies     Review of Systems        Objective:      Physical Exam  Constitutional:       Appearance: She is not ill-appearing.   Neurological:      Mental Status: Mental status is at baseline.      Gait: Gait normal.      Comments: Quiet, pleasant           Assessment:       1. Seizure    2. Akathisia        Plan:            Pt's sister in law with whom she lives is here w/pt today and feels she is doing well. No szrs or SE of meds and no new symptoms. Recall EEG times one normal, and will repeat keppra level. Pt prescribed 750 bid but her sister in law is certain they have mistakenly only been giving her 750 once q day. If therapeutic will continue current dosage.     No return of akathisias since psych meds " reduced, tapered off.   No c/o mental distress.         Savana Goetz MD   05/31/2022   1:13 PM

## 2022-06-03 LAB — LEVETIRACETAM SERPL-MCNC: 20.6 UG/ML (ref 3–60)

## 2022-06-05 RX ORDER — LEVETIRACETAM 750 MG/1
750 TABLET, EXTENDED RELEASE ORAL NIGHTLY
Qty: 90 TABLET | Refills: 1 | Status: SHIPPED | OUTPATIENT
Start: 2022-06-05 | End: 2023-06-07 | Stop reason: SDUPTHER

## 2022-06-06 ENCOUNTER — TELEPHONE (OUTPATIENT)
Dept: NEUROLOGY | Facility: CLINIC | Age: 62
End: 2022-06-06
Payer: MEDICARE

## 2022-06-06 NOTE — TELEPHONE ENCOUNTER
Spoke with pts sister to notify that keppra level are very good. Pt should continue current keppra dosage and after those are done.  will change script to Keppra slow release

## 2022-06-06 NOTE — TELEPHONE ENCOUNTER
----- Message from Savana Goetz MD sent at 6/5/2022  1:12 PM CDT -----  Regarding: keppra dosage  Pls call pt's brother or sister in law and inform that her current keppra level is very good,and to continue with the current dosage of 750mg once a day. However, I am changing the keppra to slow release, one at bedtime.For the immediate release pills ones she has left, take 1/2 po bid. cg

## 2022-06-16 ENCOUNTER — HOSPITAL ENCOUNTER (OUTPATIENT)
Dept: NEUROLOGY | Facility: CLINIC | Age: 62
Discharge: HOME | End: 2022-06-16
Payer: MEDICARE

## 2022-06-16 DIAGNOSIS — R56.9 SEIZURE: ICD-10-CM

## 2022-06-16 PROCEDURE — 95816 EEG AWAKE AND DROWSY: CPT | Mod: 26,S$PBB,, | Performed by: PSYCHIATRY & NEUROLOGY

## 2022-06-16 PROCEDURE — 95816 PR EEG,W/AWAKE & DROWSY RECORD: ICD-10-PCS | Mod: 26,S$PBB,, | Performed by: PSYCHIATRY & NEUROLOGY

## 2022-06-16 PROCEDURE — 95816 EEG AWAKE AND DROWSY: CPT | Mod: PBBFAC | Performed by: PSYCHIATRY & NEUROLOGY

## 2022-06-17 NOTE — PROCEDURES
Routine EEG Report    Binta Yang  [id number]  1960    DATE OF SERVICE:  06/16/2022  REASON FOR CONSULT:  61-year-old woman with episodes of lightheadedness and collapse.  Evaluate for evidence of epileptiform activity.    METHODOLOGY   Electroencephalographic (EEG) recording is with electrodes placed according to the International 10-20 placement system.  Thirty two (32) channels of digital signal (sampling rate of 512/sec) including T1 and T2 was simultaneously recorded from the scalp and may include  EKG, EMG, and/or eye monitors.  Recording band pass was 0.1 to 512 hz.  Digital video recording of the patient is simultaneously recorded with the EEG.  The patient is instructed report clinical symptoms which may occur during the recording session.  EEG and video recording is stored and archived in digital format. Activation procedures which include photic stimulation, hyperventilation and instructing patients to perform simple task are done in selected patients.    The EEG is displayed on a monitor screen and can be reviewed using different montages.  Computer assisted analysis is employed to detect spike and electrographic seizure activity.   The entire record is submitted for computer analysis.  The entire recording is visually reviewed and the times identified by computer analysis as being spikes or seizures are reviewed again.  Compresses spectral analysis (CSA) is also performed on the activity recorded from each individual channel.  This is displayed as a power display of frequencies from 0 to 30 Hz over time.   The CSA is reviewed looking for asymmetries in power between homologous areas of the scalp and then compared with the original EEG recording.     [employer] software is also utilized in the review of this study.  This software suite analyzes the EEG recording in multiple domains.  Coherence and rhythmicity is computed to identify EEG sections which may contain organized seizures.  Each channel  undergoes analysis to detect presence of spike and sharp waves which have special and morphological characteristic of epileptic activity.  The routine EEG recording is converted from spacial into frequency domain.  This is then displayed comparing homologous areas to identify areas of significant asymmetry.  Algorithm to identify non-cortically generated artifact is used to separate eye movement, EMG and other artifact from the EEG.      EEG FINDINGS  Background activity:   The waking background is continuous and symmetric with a well-formed 9 hz posterior dominant rhythm seen bilaterally.    Sleep:  Sleep is not captured during this recording session.    Activation procedures:   The patient is awake on room air.  She knows her name but does not know the time or location.  Photic stimulation is performed with no activation of the record.    Cardiac Monitor:   Heart rate appears generally regular on a single lead EKG.    Impression:   This is a normal awake routine EEG.  There are no prominent focal findings, no epileptiform discharges, and no electrographic seizures.   Of note, a normal EEG does not rule out the diagnosis of epilepsy.  Clinical correlation is advised.    Lynne Valdovinos MD PhD  Neurology-Epilepsy  [facility].

## 2022-07-01 ENCOUNTER — OFFICE VISIT (OUTPATIENT)
Dept: OBSTETRICS AND GYNECOLOGY | Facility: CLINIC | Age: 62
End: 2022-07-01
Payer: MEDICARE

## 2022-07-01 VITALS
BODY MASS INDEX: 25.86 KG/M2 | WEIGHT: 160.94 LBS | SYSTOLIC BLOOD PRESSURE: 126 MMHG | HEIGHT: 66 IN | DIASTOLIC BLOOD PRESSURE: 81 MMHG

## 2022-07-01 DIAGNOSIS — Z90.710 HISTORY OF HYSTERECTOMY: ICD-10-CM

## 2022-07-01 DIAGNOSIS — Z01.419 WELL WOMAN EXAM WITH ROUTINE GYNECOLOGICAL EXAM: Primary | ICD-10-CM

## 2022-07-01 PROCEDURE — G0101 PR CA SCREEN;PELVIC/BREAST EXAM: ICD-10-PCS | Mod: S$PBB,,, | Performed by: NURSE PRACTITIONER

## 2022-07-01 PROCEDURE — 99999 PR PBB SHADOW E&M-EST. PATIENT-LVL III: CPT | Mod: PBBFAC,,, | Performed by: NURSE PRACTITIONER

## 2022-07-01 PROCEDURE — 99213 OFFICE O/P EST LOW 20 MIN: CPT | Mod: PBBFAC | Performed by: NURSE PRACTITIONER

## 2022-07-01 PROCEDURE — 99999 PR PBB SHADOW E&M-EST. PATIENT-LVL III: ICD-10-PCS | Mod: PBBFAC,,, | Performed by: NURSE PRACTITIONER

## 2022-07-01 PROCEDURE — G0101 CA SCREEN;PELVIC/BREAST EXAM: HCPCS | Mod: S$PBB,,, | Performed by: NURSE PRACTITIONER

## 2022-07-01 RX ORDER — LEVETIRACETAM 500 MG/1
TABLET ORAL
COMMUNITY
Start: 2022-01-11 | End: 2022-08-26

## 2022-07-01 NOTE — PROGRESS NOTES
CC: Annual  HPI: Pt is a 61 y.o.  female who presents for routine annual exam. Here with her son. Last saw me in 2018. At that time we attempted a pelvic exam but she was unable to tolerate it. No issues today.     LAST GYN VISIT WITH ME IN 2018-  CC: Annual  HPI: Pt is a 57 y.o.  female who presents for routine annual exam. Pt has MR and her brother is her guardian. Here today with her sister in law. Recently had routine exam with her PCP. Hx of a hysterectomy 20+ years ago at [facility]. Unsure if her ovaries are intact or not. No problems today. She is not SA. Recently had mammogram done, negative results. Denies family hx of breast or ovarian cancer.     FH:   Breast cancer: none  Colon cancer: none  Ovarian cancer: none  Uterine cancer: none    HPV vaccine: na  Last pap smear: none on file  History of abnormal pap smears: no    Colonoscopy: current  DEXA: na  Mammogram: due  STD history: no  Birth control:   OB history: L1  Tobacco use: no  Stroke, diabetes, HTN  Mental delay    ROS:  GENERAL: Feeling well overall. Denies fever or chills.   SKIN: Denies rash or lesions.   HEAD: Denies head injury or headache.   NODES: Denies enlarged lymph nodes.   CHEST: Denies chest pain or shortness of breath.   CARDIOVASCULAR: Denies palpitations or left sided chest pain.   ABDOMEN: No abdominal pain, constipation, diarrhea, nausea, vomiting or rectal bleeding.   URINARY: No dysuria, hematuria, or burning on urination.  REPRODUCTIVE: See HPI.   BREASTS: Denies pain, lumps, or nipple discharge.   HEMATOLOGIC: No easy bruisability or excessive bleeding.   MUSCULOSKELETAL: Denies joint pain or swelling.   NEUROLOGIC: Denies syncope or weakness.   PSYCHIATRIC: Denies depression, anxiety or mood swings.    PE:   APPEARANCE: Well nourished, well developed, Black or  female in no acute distress.  NODES: no cervical, supraclavicular, or inguinal lymphadenopathy  BREASTS: Symmetrical, no skin changes or visible  lesions. No palpable masses, nipple discharge or adenopathy bilaterally.  ABDOMEN: Soft. No tenderness or masses. No distention. No hernias palpated. No CVA tenderness.  VULVA: No lesions. Normal external female genitalia.  URETHRAL MEATUS: Normal size and location, no lesions, no prolapse.  URETHRA: No masses, tenderness, or prolapse.  ATTEMPTED SPEC EXAM- PT UNABLE TO TOLERATE  UTERUS: surgically absent  ADNEXA: No tenderness. No fullness or masses palpated in the adnexal regions.   ANUS PERINEUM: Normal.      Diagnosis:  1. Well woman exam with routine gynecological exam    2. History of hysterectomy        Plan:   1. Mammogram due  2. Unsure if cervix is intact- unable to view due to pt toleration  3. DXA at age 63     Patient was counseled today on the new ACS guidelines for cervical cytology screening as well as the current recommendations for breast cancer screening. She was counseled to follow up with her PCP for other routine health maintenance. Counseling session lasted approximately 10 minutes, and all her questions were answered.  For women over the age of 65, you can stop having cervical cancer screenings if you have never had abnormal cervical cells or cervical cancer, and youve had three negative Pap tests in a row. (You also can stop screening if youve had two negative Pap and HPV tests in a row in the past 10 years, with at least one test in the past 5 years.),    Follow-up with me in 2 years

## 2022-08-24 ENCOUNTER — PATIENT MESSAGE (OUTPATIENT)
Dept: ADMINISTRATIVE | Facility: HOSPITAL | Age: 62
End: 2022-08-24
Payer: MEDICARE

## 2022-08-26 ENCOUNTER — OFFICE VISIT (OUTPATIENT)
Dept: INTERNAL MEDICINE | Facility: CLINIC | Age: 62
End: 2022-08-26
Payer: MEDICARE

## 2022-08-26 ENCOUNTER — LAB VISIT (OUTPATIENT)
Dept: LAB | Facility: OTHER | Age: 62
End: 2022-08-26
Attending: INTERNAL MEDICINE
Payer: MEDICARE

## 2022-08-26 VITALS
WEIGHT: 167.13 LBS | BODY MASS INDEX: 29.61 KG/M2 | SYSTOLIC BLOOD PRESSURE: 120 MMHG | HEIGHT: 63 IN | HEART RATE: 85 BPM | OXYGEN SATURATION: 99 % | DIASTOLIC BLOOD PRESSURE: 64 MMHG

## 2022-08-26 DIAGNOSIS — E11.65 TYPE 2 DIABETES MELLITUS WITH HYPERGLYCEMIA, WITHOUT LONG-TERM CURRENT USE OF INSULIN: ICD-10-CM

## 2022-08-26 DIAGNOSIS — E66.3 OVERWEIGHT (BMI 25.0-29.9): ICD-10-CM

## 2022-08-26 DIAGNOSIS — I10 ESSENTIAL HYPERTENSION: ICD-10-CM

## 2022-08-26 DIAGNOSIS — K63.5 POLYP OF COLON, UNSPECIFIED PART OF COLON, UNSPECIFIED TYPE: ICD-10-CM

## 2022-08-26 DIAGNOSIS — E11.9 TYPE 2 DIABETES MELLITUS WITHOUT COMPLICATION, WITHOUT LONG-TERM CURRENT USE OF INSULIN: Primary | ICD-10-CM

## 2022-08-26 LAB
ALBUMIN/CREAT UR: 7.5 UG/MG (ref 0–30)
CREAT UR-MCNC: 134.2 MG/DL (ref 15–325)
MICROALBUMIN UR DL<=1MG/L-MCNC: 10 UG/ML

## 2022-08-26 PROCEDURE — 99214 OFFICE O/P EST MOD 30 MIN: CPT | Mod: PBBFAC | Performed by: INTERNAL MEDICINE

## 2022-08-26 PROCEDURE — 99215 PR OFFICE/OUTPT VISIT, EST, LEVL V, 40-54 MIN: ICD-10-PCS | Mod: S$PBB,,, | Performed by: INTERNAL MEDICINE

## 2022-08-26 PROCEDURE — 99999 PR PBB SHADOW E&M-EST. PATIENT-LVL IV: CPT | Mod: PBBFAC,,, | Performed by: INTERNAL MEDICINE

## 2022-08-26 PROCEDURE — 99215 OFFICE O/P EST HI 40 MIN: CPT | Mod: S$PBB,,, | Performed by: INTERNAL MEDICINE

## 2022-08-26 PROCEDURE — 82043 UR ALBUMIN QUANTITATIVE: CPT | Performed by: INTERNAL MEDICINE

## 2022-08-26 PROCEDURE — 99999 PR PBB SHADOW E&M-EST. PATIENT-LVL IV: ICD-10-PCS | Mod: PBBFAC,,, | Performed by: INTERNAL MEDICINE

## 2022-08-26 RX ORDER — FLASH GLUCOSE SCANNING READER
EACH MISCELLANEOUS
Qty: 1 EACH | Refills: 0 | Status: SHIPPED | OUTPATIENT
Start: 2022-08-26 | End: 2022-09-06 | Stop reason: SDUPTHER

## 2022-08-26 RX ORDER — FLASH GLUCOSE SENSOR
KIT MISCELLANEOUS
Qty: 1 KIT | Refills: 11 | Status: SHIPPED | OUTPATIENT
Start: 2022-08-26 | End: 2022-09-06 | Stop reason: SDUPTHER

## 2022-08-26 RX ORDER — EMPAGLIFLOZIN 10 MG/1
10 TABLET, FILM COATED ORAL DAILY
Qty: 30 TABLET | Refills: 11 | Status: SHIPPED | OUTPATIENT
Start: 2022-08-26 | End: 2023-06-07 | Stop reason: SDUPTHER

## 2022-08-26 NOTE — PROGRESS NOTES
Subjective:       Patient ID: Binta Yang is a 61 y.o. female who  has a past medical history of Cyanocobalamin deficiency, Depression, Diabetes mellitus, Essential hypertension, Heart disease, Hyperlipidemia, Memory loss, Mental retardation, Normocytic anemia, Pre-diabetes, Seizures, Stroke, and Syncope and collapse.    Chief Complaint: Diabetes    History was obtained from the patient and supplemented through chart review and from her sister-in-law (Irma).  -Saw OBGYN for well-woman exam.  -reviewed OSH records from [facility] RE C scope.    History is limited due to MR. GANDHI as below.  Used to go to an adult day center but stopped d/t COVID.  Not restarting in the near future.    HPI    DM2 is uncontrolled.  Not on medications. Has chronic loose stools. Would be adverse to injections such as Trulicity.    Doesn't have a glucometer.  Family doesn't think she would allow them to do fingersticks.    Diet: Was eating deli meats for lunch at the adult day care. Hard to control diet at the adult .   Currently at home d/t pandemic. Her brother and sister-in-law prepare meals. She prefers to eat food with soft texture, such as mashed potatoes. Has been very difficult to regulate diet. She gets up in the middle of the night to get food. Family also with poor eating habits; sister in law admits to skipping meals, binge eating.     Breakfast: she adds sugar to cereal  Lunch: sandwich on white bread, cajun turkey. Doesn't like white bread. Chips.  Dinner: varies  Snacks: She sneaks in snacks, cookies, chips. Can finish an entire carton of ice cream.     Drinks: a lot of soft drinks. She didn't like sparkling water      Exercise: none. Watches TV.     No elevated microalbumin creatinine ratio.  Not an ACE/ARB.   Retinal exams: 10/2021  Foot exams:  10/2021  DM edu: none  Lab Results   Component Value Date/Time    HGBA1C 7.5 (H) 05/19/2022 09:52 AM    HGBA1C 7.5 (H) 05/19/2022 09:52 AM    HGBA1C 6.7 (H) 02/18/2022  07:34 AM     Overweight: diet, exercise as above.  BMI Readings from Last 3 Encounters:   08/26/22 29.60 kg/m²   07/01/22 25.98 kg/m²   05/31/22 27.29 kg/m²     HTN:    MRI brain with small vessel chronic ischemic change, greater than expected for her age. Remote small infarct at the R frontal lobe and lacunar infarct.      BP is controlled on Norvasc 10.  Used to be on losartan 100. +DM, but microalbuminuria resolved.  Home BP:      Colon polyp:    C scope [facility] 7/2019 c polyp, internal hemorrhoid. Bx c hyperplastic polyp. Repeat in 5 years.              Not addressed today.  Aortic atherosclerosis, HLD:    Was on Lipitor 20, but not lately and unsure why this was discontinued.  Restarted Lipitor 40 5-2022.  +DM. Diet as above.  MRI brain with history of small infarct.   FLP borderline high. ASCVD intermediate. +DM. Small remote infarct seen on MRI brain. Restarted Lipitor at 40.  Monitor FLP annually.  Lab Results   Component Value Date    LDLCALC 142.0 05/19/2022     The 10-year ASCVD risk score (Marco HASKINS Jr., et al., 2013) is: 16.7%    Values used to calculate the score:      Age: 61 years      Sex: Female      Is Non- : Yes      Diabetic: Yes      Tobacco smoker: No      Systolic Blood Pressure: 120 mmHg      Is BP treated: Yes      HDL Cholesterol: 60 mg/dL      Total Cholesterol: 221 mg/dL    LOC, ?  Seizure:  Occurred 3/2021.  See HPI 06/24/2021.  Concerning for seizure.  Has occurred before at the [facility]. Occurs 2/year x 3 years. No triggers. This is the first time it occurred at home.  Will have [facility] each time.  No hx sz.      Was seen in the ED 3/2021.  Troponin negative.  EKG without ST changes.  CT head without acute abnormality. Labs with mild anemia.      MRI brain with small vessel chronic ischemic change, greater than expected for her age. Remote small infarct at the R frontal lobe and lacunar infarct. No mass.  HTN has been controlled.     Cardiac w/u neg. Also saw  "Cardiology; more consistent with seizure than cardiac cause. Surgery as a child most likely for a cardiac defect ASD vs VSD.   TTE 5-2021 with normal EF, grade 1 diastolic dysfunction.     TSH, HIV NR. Started Keppra. Saw Neuro. DDx syncope, sz. Try to video next episode. Cont Keppra; labs c therapeutic level. Involuntary movements improved after stopping Geodon. Consider treating LISETH.      Vitamin B12 deficiency, normocytic anemia:    Colonoscopy 7/31/2019 with polyp as above.   History of hysterectomy for hygienic reasons and self-care.  OBGYN for pelvic exam q2 years, 7/2022   B12 wnl off B12 supplement. No supplement needed.   Stable.  Iron panel wnl. UTD cscope.  Lab Results   Component Value Date    IRON 75 04/06/2021    TIBC 317 04/06/2021    FERRITIN 76 04/06/2021     Lab Results   Component Value Date    BXNXLAYB37 505 05/19/2022     Lab Results   Component Value Date    FOLATE 8.7 12/21/2018     Cholelithiasis, loose stool:    Chronic loose stools. Denied abdominal pain , fever, change in appetite. Following with [facility].  Recommended acid reflux measures. UTD cscope.  Persistent.  Try probiotic or maybe metamucil to bulk up the stool. UTD cscope with [facility].      Functional fecal and urinary incontinence:    Her sister-in-law thinks that she waits until the last minute and therefore can't get to the restroom in time and is "lazy to get up".  No urinary sx, abdominal pain.  No change in mental status, confusion.  Requires a lot of encouragement to do ADLs.  Hard to do timed voiding at the adult day care.  UCx NG.       Following with Urogynecology.  Unsure if urodynamics, cystoscopy will be of benefit.  Considering sacral neuro modulation.  Was on oxybutynin, Myrbetriq.  Persistent. No s/sx UTI. Family declined invasive procedures. F/u c Urogyn to discuss medical management.     AR:   + rhinorrhea, breathing more loudly.  No postnasal drip, fever.  Likely unable to use Flonase correctly. Cont " antihistamine PRN.        LISETH:     Confirmed on OSH PSG.  Is not on CPAP.     MR:    At baseline, she has a developmental delay/mild MR.  The patient's brother is her guardian.  She attends an adult  and lives with her brother and sister in law.  Her family prepares her meals.  She can bathe herself if her family members prepare the water.  She requires frequent reminders to shower and brush her teeth.  She can walk and transfer herself without difficulty. She has chronic facial asymmetry and her sister-in-law states her mouth is usually twisted towards the right.  The patient is usually quiet.  She can nod her head and answer in one-word responses; has appropriate responses.  Needs encouragement and assistance to perform ADLs. Goes to adult .  RTA form completed and returned to the patient. Handicap tag not appropriate in the absence of physical disabilty and would benefit from increased walking.     Depression:    Her daughter passed away who had Down syndrome and MR.  Following with [facility], Psychiatry. Plan was to taper off Geodon QHS and continue Prozac 20. Stopped both meds as below due to tremor.  They would like to see a new psychiatrist.   They would like to see a new psychiatrist. Advised to check with their insurance. Provided phone numbers of Psychiatrists.       Tremor:  Was having repetitive movements of her fingers/toes.  Saw Neurology.  Involuntary movements likely d/t Geodon. Unclear if she needs it. Referred to Psych for depression. If long wait, considering d/c Geodon->Ingrezza.   Stopped both Geodon, Fluoxetine.  Has less jerking/twitching.  No changed in mental status, mood.  Cont Keppra. Provided phone numbers for Psychiatry to optimize meds. F/u c Neuro.    Intertrigo:  +functional fecal and urinary incontinence Switched to a more gentler soap; stopped bubble baths.  No change in lotions, detergents.  Improved for a month with topical ketoconazole, treatment of  "incontinence as below, but recurrent.  + pruritus.  Treated with Diflucan for yeast vaginitis.  Recurrent. Hygiene, incontinence contributing. A1C wnl . Keep area clean, dry. Rec breathable clothing, cotton. Topical Ketoconazole. F/u Urogyn for IC.      Review of Systems   Unable to perform ROS: Patient nonverbal   Constitutional: Negative for activity change and appetite change.   Musculoskeletal: Negative for gait problem.   Psychiatric/Behavioral: Negative for confusion.       I personally reviewed Past Medical History, Past Surgical History, Social History, and Family History.    Objective:      Vitals:    08/26/22 0837   BP: 120/64   Pulse: 85   SpO2: 99%   Weight: 75.8 kg (167 lb 1.7 oz)   Height: 5' 3" (1.6 m)      Physical Exam  Constitutional:       General: She is not in acute distress.     Appearance: She is well-developed. She is not diaphoretic.   HENT:      Head: Normocephalic and atraumatic.   Eyes:      General:         Right eye: No discharge.         Left eye: No discharge.   Pulmonary:      Effort: Pulmonary effort is normal. No respiratory distress.   Skin:     Coloration: Skin is not pale.      Findings: No erythema.   Neurological:      Mental Status: She is alert.   Psychiatric:         Behavior: Behavior normal.           Lab Results   Component Value Date    WBC 5.05 05/19/2022    HGB 11.9 (L) 05/19/2022    HCT 37.1 05/19/2022     05/19/2022    CHOL 221 (H) 05/19/2022    TRIG 95 05/19/2022    HDL 60 05/19/2022    ALT 11 05/19/2022    AST 16 05/19/2022     05/19/2022    K 4.1 05/19/2022     05/19/2022    CREATININE 1.1 05/19/2022    BUN 18 05/19/2022    CO2 24 05/19/2022    TSH 1.162 04/06/2021    HGBA1C 7.5 (H) 05/19/2022    HGBA1C 7.5 (H) 05/19/2022       The 10-year ASCVD risk score (Marco HASKINS Jr., et al., 2013) is: 16.7%    Values used to calculate the score:      Age: 61 years      Sex: Female      Is Non- : Yes      Diabetic: Yes      Tobacco " none smoker: No      Systolic Blood Pressure: 120 mmHg      Is BP treated: Yes      HDL Cholesterol: 60 mg/dL      Total Cholesterol: 221 mg/dL    (Imaging have been independently reviewed)  MRI brain with small vessel chronic ischemic change, greater than expected for her age. Remote small infarct at the R frontal lobe and lacunar infarct. No mass.     Assessment:       1. Type 2 diabetes mellitus without complication, without long-term current use of insulin    2. Overweight (BMI 25.0-29.9)    3. Essential hypertension    4. Polyp of colon, unspecified part of colon, unspecified type          Plan:       Binta was seen today for diabetes.    Diagnoses and all orders for this visit:    Type 2 diabetes mellitus without complication, without long-term current use of insulin  Comments:  A1C elevated d/t dietary indescretions. Hard to regulate diet; family would benefit from DM edu. A1C today. Jardiance d/t borderline low GFR. Addtl note below.  Orders:  -     Ambulatory referral/consult to Diabetes Education; Future  -     flash glucose scanning reader (FREESTYLE ALEJANDRO 14 DAY READER) Misc; Check blood glucose before meals and nightly.  -     flash glucose sensor (FREESTYLE ALEJANDRO 14 DAY SENSOR) Kit; Apply to skin for 14 days.  Check blood glucose before meals and nightly.  -     Ambulatory referral/consult to Pharmacy Assistance; Future  -     empagliflozin (JARDIANCE) 10 mg tablet; Take 1 tablet (10 mg total) by mouth once daily.    Overweight (BMI 25.0-29.9)  Comments:  Alejandro if covered d/t intellectual disability. Avoid Metformin d/t chronic loose stools, Trulicity as she would most likely not be compliant c injection.  Orders:  -     Ambulatory referral/consult to Diabetes Education; Future    Essential hypertension  Comments:  BP controlled. Cont Norvasc 10. +DM, but microalbuminuria resolved.    Polyp of colon, unspecified part of colon, unspecified type  Comments:  Discussed results with her family. Repeat in 5  years, 2024.         Side effects of medication(s) were discussed in detail and patient voiced understanding.  Patient will call back for any issues or complications.     I have spent a total of 45 minutes with the patient as well as reviewing the chart/medical record and placing orders on the day of the visit. Discussed DM, colonoscopy results.     RTC in 1 month(s) or sooner PRN for DM, med adjustment. Telephone.    none

## 2022-08-30 ENCOUNTER — TELEPHONE (OUTPATIENT)
Dept: INTERNAL MEDICINE | Facility: CLINIC | Age: 62
End: 2022-08-30
Payer: MEDICARE

## 2022-08-30 NOTE — TELEPHONE ENCOUNTER
Returned call and spoke with pt's sister Melinda. She states the CGM was sent to [facility]. Informed we will send to [facility] to see if it will e covered there as it is most likely the best place for her insurance.    States understanding and appreciation.    Faxed CGM orders to [facility] fax # [phone] via [employer] fax.

## 2022-08-30 NOTE — TELEPHONE ENCOUNTER
----- Message from Chace Wilkins sent at 8/30/2022 11:12 AM CDT -----  Name of Who is Calling: NOVA MARCUS [[id number]]            What is the request in detail: Patient is requesting call back to inform Dr. Weir the free libire diabetes machine she prescribed medicaid is not covering cost. What to do next?              Can the clinic reply by [facility]: no              What Number to Call Back if not in [facility]: [phone]

## 2022-08-31 ENCOUNTER — TELEPHONE (OUTPATIENT)
Dept: PHARMACY | Facility: CLINIC | Age: 62
End: 2022-08-31
Payer: MEDICARE

## 2022-08-31 ENCOUNTER — PATIENT MESSAGE (OUTPATIENT)
Dept: PHARMACY | Facility: CLINIC | Age: 62
End: 2022-08-31
Payer: MEDICARE

## 2022-08-31 NOTE — TELEPHONE ENCOUNTER
I have reached out to inform pt whats required to apply for assistance with Jardiance program.She did not answer. I left a voicemail and sent a my chart msg I will follow up in 5 business days.

## 2022-09-01 ENCOUNTER — HOSPITAL ENCOUNTER (OUTPATIENT)
Dept: RADIOLOGY | Facility: OTHER | Age: 62
Discharge: HOME | End: 2022-09-01
Attending: INTERNAL MEDICINE
Payer: MEDICARE

## 2022-09-01 DIAGNOSIS — Z12.31 ENCOUNTER FOR SCREENING MAMMOGRAM FOR BREAST CANCER: ICD-10-CM

## 2022-09-01 PROCEDURE — 77067 MAMMO DIGITAL SCREENING BILAT WITH TOMO: ICD-10-PCS | Mod: 26,,, | Performed by: RADIOLOGY

## 2022-09-01 PROCEDURE — 77063 BREAST TOMOSYNTHESIS BI: CPT | Mod: TC

## 2022-09-01 PROCEDURE — 77063 BREAST TOMOSYNTHESIS BI: CPT | Mod: 26,,, | Performed by: RADIOLOGY

## 2022-09-01 PROCEDURE — 77063 MAMMO DIGITAL SCREENING BILAT WITH TOMO: ICD-10-PCS | Mod: 26,,, | Performed by: RADIOLOGY

## 2022-09-01 PROCEDURE — 77067 SCR MAMMO BI INCL CAD: CPT | Mod: 26,,, | Performed by: RADIOLOGY

## 2022-09-06 DIAGNOSIS — E11.9 TYPE 2 DIABETES MELLITUS WITHOUT COMPLICATION, WITHOUT LONG-TERM CURRENT USE OF INSULIN: ICD-10-CM

## 2022-09-06 RX ORDER — FLASH GLUCOSE SCANNING READER
EACH MISCELLANEOUS
Qty: 1 EACH | Refills: 0 | Status: SHIPPED | OUTPATIENT
Start: 2022-09-06

## 2022-09-06 RX ORDER — FLASH GLUCOSE SENSOR
KIT MISCELLANEOUS
Qty: 1 KIT | Refills: 11 | Status: SHIPPED | OUTPATIENT
Start: 2022-09-06

## 2022-09-06 NOTE — TELEPHONE ENCOUNTER
This was a 3way call with patient's brother and his wife.Confirmed patient has medicare a/b and medicaid. Patient does not need assistance with Jardiance it was picked up from the pharmacy at no charge. Patient needs assistance with (Bertha)diabetic machine and testing supplies. I informed pt's brother the pharmacy patient assistance team does not assist with diabetic testing supplies and to reach out to insurance company to find out which brand is covered under plan and have Dr Weir write prescription for that machine. Also informed patient's brother that if the Bertha machine is medically necessary Dr Weir's office can do a prior authorization.

## 2022-09-06 NOTE — TELEPHONE ENCOUNTER
No new care gaps identified.  [facility] Embedded Care Gaps. Reference number: [id number]. 9/06/2022   2:15:19 PM CDT

## 2022-09-06 NOTE — TELEPHONE ENCOUNTER
Patient brother states that medicare does not cover medication Freestyle Iris 14 Day [place] and will like an new Rx sent to [facility] as he states he was informed per talking to pharmacy that they will work with patient to see which Rx is covered or similar to the Rx prescribed. Requested medication has been pended and routed to Dr. Weir  for approval. LOV 8/26/22. Upcoming Visits 10/19/2022.       [facility]

## 2022-09-06 NOTE — TELEPHONE ENCOUNTER
----- Message from Maria C Worthy sent at 9/6/2022  1:26 PM CDT -----  Type: Patient Call Back    Who called: kelly     What is the request in detail:asking for a call back from nurse about pt Rx     Can the clinic reply by [facility]?    Would the patient rather a call back or a response via [facility]?     Best call back number: [phone]

## 2022-09-06 NOTE — TELEPHONE ENCOUNTER
[facility] can't fill it either.  This is not covered under her pharmacy benefit.     We faxed it to a DME supplier ([facility]) as it should be covered by the patient's medicare A/B plan. I checked with Rosy and that is correct.

## 2022-09-14 ENCOUNTER — CLINICAL SUPPORT (OUTPATIENT)
Dept: DIABETES | Facility: CLINIC | Age: 62
End: 2022-09-14
Payer: MEDICARE

## 2022-09-14 VITALS — WEIGHT: 166.75 LBS | BODY MASS INDEX: 29.55 KG/M2 | HEIGHT: 63 IN

## 2022-09-14 DIAGNOSIS — E66.3 OVERWEIGHT (BMI 25.0-29.9): ICD-10-CM

## 2022-09-14 DIAGNOSIS — E11.9 TYPE 2 DIABETES MELLITUS WITHOUT COMPLICATION, WITHOUT LONG-TERM CURRENT USE OF INSULIN: ICD-10-CM

## 2022-09-14 PROCEDURE — G0108 DIAB MANAGE TRN  PER INDIV: HCPCS | Mod: PBBFAC

## 2022-09-14 PROCEDURE — 99213 OFFICE O/P EST LOW 20 MIN: CPT | Mod: PBBFAC

## 2022-09-14 PROCEDURE — 99999 PR PBB SHADOW E&M-EST. PATIENT-LVL III: CPT | Mod: PBBFAC,,,

## 2022-09-14 PROCEDURE — 99999 PR PBB SHADOW E&M-EST. PATIENT-LVL III: ICD-10-PCS | Mod: PBBFAC,,,

## 2022-09-15 NOTE — PROGRESS NOTES
Diabetes Care Specialist Progress Note  Author: Rosy Tran RN, CDE  Date: 9/15/2022    Program Intake  Reason for Diabetes Program Visit:: Initial Diabetes Assessment  Current diabetes risk level:: low    Lab Results   Component Value Date    HGBA1C 7.7 (H) 08/26/2022     Clinical     Problem Review  Reviewed Problem List with Patient: yes  Active comorbidities affecting diabetes self-care.: yes  Comorbidities: Hypertension  Reviewed health maintenance: yes    Clinical Assessment  Current Diabetes Treatment: Oral Medication, Diet    Additional Social History    Support  Does anyone support you with your diabetes care?: yes  Who supports you?: family member  Who takes you to your medical appointments?: family member  Does the current support meet the patient's needs?: Yes  Is Support an area impacting ability to self-manage diabetes?: No    Patient accompanied by family today to discuss next steps with lifestyle changes.      Cognitive/Behavioral Health  Alert and Oriented: Yes  Difficulty Thinking: Yes  Requires Prompting: Yes  Requires assistance for routine expression?: Yes  Cognitive or behavioral barriers impacting ability to self-manage diabetes?: Yes (intellectual disability, always accompanied by caregiver)    Culture/Druze  Culture or Orthodoxy beliefs that may impact ability to access healthcare: No    Communication  Language preference: English  Hearing Problems: No  Vision Problems: No  Communication needs impacting ability to self-manage diabetes?: Yes (limited verbal responses)    Health Literacy  Preferred Learning Method: Face to Face  How often do you need to have someone help you read instructions, pamphlets, or written material from your doctor or pharmacy?: Always  Health literacy needs impacting ability to self-manage diabetes?: Yes    Diabetes Self-Management Skills Assessment    Diabetes Disease Process/Treatment Options  Patient/caregiver able to state what happens when someone has  diabetes.: yes  Patient/caregiver knows what type of diabetes they have.: yes  Diabetes Type : Type II  Patient/caregiver able to identify at least three signs and symptoms of diabetes.: no  Patient able to identify at least three risk factors for diabetes.: no  Diabetes Disease Process/Treatment Options: Skills Assessment Completed: Yes  Assessment indicates:: Knowledge deficit, Instruction Needed  Area of need?: Yes    Nutrition/Healthy Eating  Challenges to healthy eating:: snacking between meals and at night, portion control (family providing food, pt lives with family)  Method of carbohydrate measurement:: plate method  Patient can identify foods that impact blood sugar.: yes  Patient-identified foods:: fruit/fruit juice, soda, starchy vegetables (corn, peas, beans), starches (bread, pasta, rice, cereal), sweets  Nutrition/Healthy Eating Skills Assessment Completed:: Yes  Assessment indicates:: Knowledge deficit, Instruction Needed  Area of need?: Yes    Physical Activity/Exercise  Physical Activity/Exercise Skills Assessment Completed: : No  Deffered due to:: Time    Medications  Patient is able to describe current diabetes management routine.: yes  Diabetes management routine:: diet, oral medications  Patient is able to identify current diabetes medications, dosages, and appropriate timing of medications.: yes  Patient understands the purpose of the medications taken for diabetes.: no  Patient reports problems or concerns with current medication regimen.: no  Medication Skills Assessment Completed:: Yes  Assessment indicates:: Knowledge deficit, Instruction Needed  Area of need?: Yes    Home Blood Glucose Monitoring  Patient states that blood sugar is checked at home daily.: no  Reasons for not monitoring:: other (see comments), finger pain (pt not allowing family to check BG - trouble w/ insurance covering Bertha)  Home Blood Glucose Monitoring Skills Assessment Completed: : Yes  Assessment indicates::  Knowledge deficit, Instruction Needed  Area of need?: Yes    Acute Complications  Acute Complications Skills Assessment Completed: : No  Deffered due to:: Time    Chronic Complications  Chronic Complications Skills Assessment Completed: : No  Deferred due to:: Time    Psychosocial/Coping  Psychosocial/Coping Skills Assessment Completed: : No  Deffered due to:: Time    Diabetes Self Support Plan     Assessment Summary and Plan    Based on today's diabetes care assessment, the following areas of need were identified:      Social 9/14/2022   Support No   Cognitive/Behavioral Health Yes   Culture/Tenriism No   Communication Yes   Health Literacy Yes      Diabetes Self-Management Skills 9/14/2022   Diabetes Disease Process/Treatment Options Yes: reviewed s/s of higher blood sugar and progression of DM + importance of addressing insulin resistance    Nutrition/Healthy Eating Yes: see care planning     Family has been making significant changes at home - not keeping sweet drinks in house anymore. Working on swapping out snack options and offering healthier snacks    Medication Yes: has Jardiance - financial issues resolved   Home Blood Glucose Monitoring Yes: pt does not allow family to test BG - family working on appeal to insurance to get Bertha device    Stressed importance of getting A1C every 3 mo if unable to test BG and monitor progress w/ BG control this way      Today's interventions were provided through individual discussion, instruction, and written materials were provided.      Patient verbalized understanding of instruction and written materials.  Pt was able to return back demonstration of instructions today. Patient understood key points, needs reinforcement and further instruction.     Diabetes Self-Management Care Plan:    Today's Diabetes Self-Management Care Plan was developed with Binta's input. Binta has agreed to work toward the following goal(s) to improve his/her overall diabetes control.       Care Plan: Diabetes Management   Updates made since 8/16/2022 12:00 AM        Problem: Healthy Eating         Goal: Pt will eliminate sweet drinks from diet.    Start Date: 9/14/2022   Expected End Date: 10/15/2022   Priority: High   Barriers: No Barriers Identified   Note:    9/14/22 - Family has been working on cutting out cold drinks. Pt still drinking juice. Discussed suitable alternatives to help with cutting out sweet drinks 100%.         Task: Reviewed the sources and role of Carbohydrate, Protein, and Fat and how each nutrient impacts blood sugar. Completed 9/15/2022        Task: Provided visual examples using dry measuring cups, food models, and other familiar objects such as computer mouse, deck or cards, tennis ball etc. to help with visualization of portions.         Task: Recommended replacing beverages containing high sugar content with noncaloric/sugar free options and/or water. Completed 9/15/2022        Task: Provided Sample plate method and reviewed the use of the plate to estimate amounts of carbohydrate per meal. Completed 9/15/2022        Follow Up Plan     Follow up if symptoms worsen or fail to improve.    Today's care plan and follow up schedule was discussed with patient.  Binta verbalized understanding of the care plan, goals, and agrees to follow up plan.        The patient was encouraged to communicate with his/her health care provider/physician and care team regarding his/her condition(s) and treatment.  I provided the patient with my contact information today and encouraged to contact me via phone or [facility] as needed.     Length of Visit   Total Time: 60 Minutes

## 2022-09-26 DIAGNOSIS — R40.20 LOC (LOSS OF CONSCIOUSNESS): ICD-10-CM

## 2022-09-27 RX ORDER — LEVETIRACETAM 500 MG/1
TABLET ORAL
Qty: 60 TABLET | Refills: 11 | OUTPATIENT
Start: 2022-09-27

## 2022-10-03 DIAGNOSIS — Z71.89 COMPLEX CARE COORDINATION: ICD-10-CM

## 2022-10-20 ENCOUNTER — PES CALL (OUTPATIENT)
Dept: ADMINISTRATIVE | Facility: CLINIC | Age: 62
End: 2022-10-20
Payer: MEDICARE

## 2022-11-08 ENCOUNTER — TELEPHONE (OUTPATIENT)
Dept: ADMINISTRATIVE | Facility: CLINIC | Age: 62
End: 2022-11-08
Payer: MEDICARE

## 2022-11-08 NOTE — TELEPHONE ENCOUNTER
Called pt, no answer; left message informing pt I was calling to remind pt of her in office EAWV on 11/8/22 and to return my call if she had any questions; left my name and number

## 2022-11-09 ENCOUNTER — CLINICAL SUPPORT (OUTPATIENT)
Dept: INTERNAL MEDICINE | Facility: CLINIC | Age: 62
End: 2022-11-09
Payer: MEDICARE

## 2022-11-09 ENCOUNTER — OFFICE VISIT (OUTPATIENT)
Dept: INTERNAL MEDICINE | Facility: CLINIC | Age: 62
End: 2022-11-09
Payer: MEDICARE

## 2022-11-09 VITALS
BODY MASS INDEX: 31.67 KG/M2 | HEIGHT: 61 IN | HEART RATE: 78 BPM | DIASTOLIC BLOOD PRESSURE: 68 MMHG | OXYGEN SATURATION: 99 % | WEIGHT: 167.75 LBS | SYSTOLIC BLOOD PRESSURE: 118 MMHG

## 2022-11-09 DIAGNOSIS — E66.3 OVERWEIGHT (BMI 25.0-29.9): ICD-10-CM

## 2022-11-09 DIAGNOSIS — R53.83 FATIGUE, UNSPECIFIED TYPE: ICD-10-CM

## 2022-11-09 DIAGNOSIS — R56.9 SEIZURE: ICD-10-CM

## 2022-11-09 DIAGNOSIS — K63.5 POLYP OF COLON, UNSPECIFIED PART OF COLON, UNSPECIFIED TYPE: ICD-10-CM

## 2022-11-09 DIAGNOSIS — H91.90 HEARING LOSS, UNSPECIFIED HEARING LOSS TYPE, UNSPECIFIED LATERALITY: ICD-10-CM

## 2022-11-09 DIAGNOSIS — E11.9 TYPE 2 DIABETES MELLITUS WITHOUT COMPLICATION, WITHOUT LONG-TERM CURRENT USE OF INSULIN: ICD-10-CM

## 2022-11-09 DIAGNOSIS — J30.89 SEASONAL ALLERGIC RHINITIS DUE TO OTHER ALLERGIC TRIGGER: ICD-10-CM

## 2022-11-09 DIAGNOSIS — L30.4 INTERTRIGO: ICD-10-CM

## 2022-11-09 DIAGNOSIS — R19.5 LOOSE STOOLS: ICD-10-CM

## 2022-11-09 DIAGNOSIS — F33.0 MILD EPISODE OF RECURRENT MAJOR DEPRESSIVE DISORDER: ICD-10-CM

## 2022-11-09 DIAGNOSIS — R39.81 FUNCTIONAL INCONTINENCE: ICD-10-CM

## 2022-11-09 DIAGNOSIS — F79 INTELLECTUAL DISABILITY: ICD-10-CM

## 2022-11-09 DIAGNOSIS — I10 ESSENTIAL HYPERTENSION: ICD-10-CM

## 2022-11-09 DIAGNOSIS — I70.0 AORTIC ATHEROSCLEROSIS: ICD-10-CM

## 2022-11-09 DIAGNOSIS — K80.80 BILIARY CALCULUS OF OTHER SITE WITHOUT OBSTRUCTION: ICD-10-CM

## 2022-11-09 DIAGNOSIS — E53.8 CYANOCOBALAMIN DEFICIENCY: ICD-10-CM

## 2022-11-09 DIAGNOSIS — E11.36 TYPE 2 DIABETES MELLITUS WITH CATARACT: ICD-10-CM

## 2022-11-09 DIAGNOSIS — D64.9 NORMOCYTIC ANEMIA: ICD-10-CM

## 2022-11-09 DIAGNOSIS — Z23 IMMUNIZATION DUE: Primary | ICD-10-CM

## 2022-11-09 DIAGNOSIS — K64.8 INTERNAL HEMORRHOIDS: ICD-10-CM

## 2022-11-09 DIAGNOSIS — Z00.00 ENCOUNTER FOR PREVENTIVE HEALTH EXAMINATION: Primary | ICD-10-CM

## 2022-11-09 PROCEDURE — G0008 ADMIN INFLUENZA VIRUS VAC: HCPCS | Mod: PBBFAC

## 2022-11-09 PROCEDURE — 99999 PR PBB SHADOW E&M-EST. PATIENT-LVL V: ICD-10-PCS | Mod: PBBFAC,,, | Performed by: NURSE PRACTITIONER

## 2022-11-09 PROCEDURE — G0439 PPPS, SUBSEQ VISIT: HCPCS | Mod: ,,, | Performed by: NURSE PRACTITIONER

## 2022-11-09 PROCEDURE — G0439 PR MEDICARE ANNUAL WELLNESS SUBSEQUENT VISIT: ICD-10-PCS | Mod: ,,, | Performed by: NURSE PRACTITIONER

## 2022-11-09 PROCEDURE — 99215 OFFICE O/P EST HI 40 MIN: CPT | Mod: PBBFAC,25 | Performed by: NURSE PRACTITIONER

## 2022-11-09 PROCEDURE — 99999 PR PBB SHADOW E&M-EST. PATIENT-LVL V: CPT | Mod: PBBFAC,,, | Performed by: NURSE PRACTITIONER

## 2022-11-09 NOTE — PROGRESS NOTES
"  Binta Yang presented for a  Medicare AWV and comprehensive Health Risk Assessment today. The following components were reviewed and updated:    Medical history  Family History  Social history  Allergies and Current Medications  Health Risk Assessment  Health Maintenance  Care Team         ** See Completed Assessments for Annual Wellness Visit within the encounter summary.**         The following assessments were completed:  Living Situation  CAGE  Depression Screening  Timed Get Up and Go  Whisper Test  Cognitive Function Screening  Nutrition Screening  ADL Screening  PAQ Screening          Vitals:    11/09/22 0939   BP: 118/68   BP Location: Left arm   Patient Position: Sitting   Pulse: 78   SpO2: 99%   Weight: 76.1 kg (167 lb 12.3 oz)   Height: 5' 1" (1.549 m)     Body mass index is 31.7 kg/m².    Physical Exam  Vitals reviewed.   Constitutional:       Appearance: She is well-developed.   HENT:      Head: Normocephalic and atraumatic. Not macrocephalic and not microcephalic. No raccoon eyes, Goemz's sign, abrasion, contusion, right periorbital erythema, left periorbital erythema or laceration. Hair is normal.      Right Ear: No decreased hearing noted. No laceration, drainage, swelling or tenderness. No middle ear effusion. No foreign body. No mastoid tenderness. No hemotympanum. Tympanic membrane is not injected, scarred, perforated, erythematous, retracted or bulging. Tympanic membrane has normal mobility.      Left Ear: No decreased hearing noted. No laceration, drainage, swelling or tenderness.  No middle ear effusion. No foreign body. No mastoid tenderness. No hemotympanum. Tympanic membrane is not injected, scarred, perforated, erythematous, retracted or bulging. Tympanic membrane has normal mobility.      Nose: Nose normal. No nasal deformity, laceration or mucosal edema.      Mouth/Throat:      Pharynx: Uvula midline.   Eyes:      General: Lids are normal. No scleral icterus.     Conjunctiva/sclera: " Conjunctivae normal.   Neck:      Thyroid: No thyroid mass or thyromegaly.      Trachea: Trachea normal.   Cardiovascular:      Rate and Rhythm: Normal rate and regular rhythm.   Pulmonary:      Effort: Pulmonary effort is normal. No respiratory distress.      Breath sounds: Normal breath sounds.   Abdominal:      Palpations: Abdomen is soft.   Musculoskeletal:         General: Normal range of motion.      Cervical back: Neck supple. No edema or erythema. No spinous process tenderness or muscular tenderness. Normal range of motion.   Lymphadenopathy:      Head:      Right side of head: No submental, submandibular, tonsillar, preauricular or posterior auricular adenopathy.      Left side of head: No submental, submandibular, tonsillar, preauricular, posterior auricular or occipital adenopathy.   Skin:     General: Skin is warm and dry.   Neurological:      Mental Status: She is alert and oriented to person, place, and time.      Cranial Nerves: No cranial nerve deficit.      Sensory: No sensory deficit.   Psychiatric:         Behavior: Behavior normal.         Thought Content: Thought content normal.         Judgment: Judgment normal.             Diagnoses and health risks identified today and associated recommendations/orders:    1. Encounter for preventive health examination  Annual Health Risk Assessment (HRA) visit today.  Counseling and referral of health maintenance and preventative health measures performed.  Patient given annual wellness paperwork to take home.  Encouraged to return in 1 year for subsequent HRA visit.     2. Aortic atherosclerosis  Chronic. Stable. Continue current treatment plan as previously prescribed by PCP.    3. Essential hypertension  Chronic. Stable. Controlled. Encouraged to increase exercise as tolerated (moderate-intensity aerobic activity and muscle-strengthening activities) improve diet to heart healthy, low sodium diet.  Continue current treatment plan as previously prescribed  by PCP.    4. Type 2 diabetes mellitus without complication, without long-term current use of insulin  Chronic. Stable. Continue current treatment plan as previously prescribed by PCP.    5. Type 2 diabetes mellitus with cataract  Chronic. Stable. Uncontrolled. Last Hgb A1c=7.7. Continue current treatment plan as previously prescribed by PCP.    6. Overweight (BMI 25.0-29.9)  Chronic. Stable. Encouraged to increase exercise as tolerated and improve diet to heart healthy, low sodium diet. Continue current treatment plan as previously prescribed by PCP.    7. Cyanocobalamin deficiency  Chronic. Stable. Continue current treatment plan as previously prescribed by PCP.    8. Loose stools  Chronic. Stable. Continue current treatment plan as previously prescribed by PCP    9. Internal hemorrhoids  Chronic. Stable. Continue current treatment plan as previously prescribed by PCP.    10. Polyp of colon, unspecified part of colon, unspecified type  Chronic. Stable. Continue current treatment plan as previously prescribed by PCP.    11. Biliary calculus of other site without obstruction  Chronic. Stable. Continue current treatment plan as previously prescribed by PCP.    12. Fatigue, unspecified type  Chronic. Stable. Continue current treatment plan as previously prescribed by PCP.    13. Normocytic anemia  Chronic. Stable. Continue current treatment plan as previously prescribed by PCP.    14. Functional incontinence  Chronic. Stable. Continue current treatment plan as previously prescribed by PCP.    15. Seasonal allergic rhinitis due to other allergic trigger  Chronic. Stable. Continue current treatment plan as previously prescribed by PCP.    16. Intertrigo  Chronic. Stable. Continue current treatment plan as previously prescribed by PCP.    17. Mild episode of recurrent major depressive disorder  Chronic. Stable. Continue current treatment plan as previously prescribed by PCP.    18. Intellectual disability  Chronic.  Stable. Continue current treatment plan as previously prescribed by PCP.    19. Seizure  Chronic. Stable. Continue current treatment plan as previously prescribed by PCP.        Provided Herdine with a 5-10 year written screening schedule and personal prevention plan. Recommendations were developed using the USPSTF age appropriate recommendations. Education, counseling, and referrals were provided as needed. After Visit Summary printed and given to patient which includes a list of additional screenings\tests needed.      I offered to discuss end of life issues, including information on how to make advance directives that the patient could use to name someone who would make medical decisions on their behalf if they became too ill to make themselves.    ___Patient declined  _X_Patient is interested, I provided paper work and offered to discuss.    Follow up in about 1 year (around 11/9/2023).    Lucas Peña NP  I offered to discuss advanced care planning, including how to pick a person who would make decisions for you if you were unable to make them for yourself, called a health care power of , and what kind of decisions you might make such as use of life sustaining treatments such as ventilators and tube feeding when faced with a life limiting illness recorded on a living will that they will need to know. (How you want to be cared for as you near the end of your natural life)     X Patient is interested in learning more about how to make advanced directives.  I provided them paperwork and offered to discuss this with them.

## 2022-11-09 NOTE — PATIENT INSTRUCTIONS
Counseling and Referral of Other Preventative  (Italic type indicates deductible and co-insurance are waived)    Patient Name: Binta Yang  Today's Date: 11/9/2022    Health Maintenance       Date Due Completion Date    Pneumococcal Vaccines (Age 0-64) (1 - PCV) Never done ---    COVID-19 Vaccine (4 - Booster for Pfizer series) 01/13/2022 11/18/2021    Influenza Vaccine (1) 09/01/2022 12/24/2020    Foot Exam 10/07/2022 10/7/2021    Eye Exam 10/11/2022 10/11/2021    Hemoglobin A1c 02/26/2023 8/26/2022    Lipid Panel 05/19/2023 5/19/2022    High Dose Statin 08/26/2023 8/26/2022    Diabetes Urine Screening 08/26/2023 8/26/2022    Mammogram 09/01/2023 9/1/2022    Colorectal Cancer Screening 07/31/2024 7/31/2019    TETANUS VACCINE 02/11/2029 2/11/2019        No orders of the defined types were placed in this encounter.    The following information is provided to all patients.  This information is to help you find resources for any of the problems found today that may be affecting your health:                Living healthy guide: www.[facility].[place].gov      Understanding Diabetes: www.diabetes.org      Eating healthy: www.cdc.gov/healthyweight      CDC home safety checklist: www.cdc.gov/steadi/patient.html      Agency on Aging: www.goea.[place].[facility]      Alcoholics anonymous (AA): www.aa.org      Physical Activity: www.huma.nih.gov/ii3arpq      Tobacco use: www.quitwithusla.org

## 2022-11-09 NOTE — PROGRESS NOTES
"Patient was given vaccine information sheet for the Flu Vaccine. The area of injection was palpated using the acromion process as a landmark. This area was cleaned with alcohol. Using a 25g 1" safety needle, 0.5mL of the vaccine was placed into the right deltoid muscle. The injection site was dressed with a bandage. Patient experienced no complications and was discharged in stable condition. Fluarix vaccine Lot: [id number] Exp: 06/30/2023.      "

## 2022-11-28 ENCOUNTER — OFFICE VISIT (OUTPATIENT)
Dept: PODIATRY | Facility: CLINIC | Age: 62
End: 2022-11-28
Payer: MEDICARE

## 2022-11-28 ENCOUNTER — TELEPHONE (OUTPATIENT)
Dept: INTERNAL MEDICINE | Facility: CLINIC | Age: 62
End: 2022-11-28
Payer: MEDICARE

## 2022-11-28 VITALS
HEART RATE: 81 BPM | HEIGHT: 61 IN | WEIGHT: 167 LBS | BODY MASS INDEX: 31.53 KG/M2 | SYSTOLIC BLOOD PRESSURE: 142 MMHG | DIASTOLIC BLOOD PRESSURE: 63 MMHG

## 2022-11-28 DIAGNOSIS — E11.9 ENCOUNTER FOR DIABETIC FOOT EXAM: Primary | ICD-10-CM

## 2022-11-28 DIAGNOSIS — E11.9 TYPE 2 DIABETES MELLITUS WITHOUT COMPLICATION, WITHOUT LONG-TERM CURRENT USE OF INSULIN: ICD-10-CM

## 2022-11-28 PROCEDURE — 99999 PR PBB SHADOW E&M-EST. PATIENT-LVL IV: CPT | Mod: PBBFAC,,, | Performed by: PODIATRIST

## 2022-11-28 PROCEDURE — 99213 PR OFFICE/OUTPT VISIT, EST, LEVL III, 20-29 MIN: ICD-10-PCS | Mod: S$PBB,,, | Performed by: PODIATRIST

## 2022-11-28 PROCEDURE — 99214 OFFICE O/P EST MOD 30 MIN: CPT | Mod: PBBFAC,PN | Performed by: PODIATRIST

## 2022-11-28 PROCEDURE — 99999 PR PBB SHADOW E&M-EST. PATIENT-LVL IV: ICD-10-PCS | Mod: PBBFAC,,, | Performed by: PODIATRIST

## 2022-11-28 PROCEDURE — 99213 OFFICE O/P EST LOW 20 MIN: CPT | Mod: S$PBB,,, | Performed by: PODIATRIST

## 2022-11-28 NOTE — PATIENT INSTRUCTIONS
How to Check Your Feet    Below are tips to help you look for foot problems. Try to check your feet at the same time each day, such as when you get out of bed in the morning.    Check the top of each foot. The tops of toes, back of the heel, and outer edge of the foot can get a lot of rubbing from poor-fitting shoes.    Check the bottom of each foot. Daily wear and tear often leads to problems at pressure spots.    Check the toes and nails. Fungal infections often occur between toes. Toenail problems can also be a sign of fungal infections or lead to breaks in the skin.    Check your shoes, too. Loose objects inside a shoe can injure the foot. Use your hand to feel inside your shoes for things like joey, loose stitching, or rough areas that could irritate your skin.        Diabetic Foot Care    Diabetes can lead to a number of different foot complications. Fortunately, most of these complications can be prevented with a little extra foot care. If diabetes is not well controlled, the high blood sugar can cause damage to blood vessels and result in poor circulation to the foot. When the skin does not get enough blood flow, it becomes prone to pressure sores and ulcers, which heal slowly.  High blood sugar can also damage nerves, interfering with the ability to feel pain and pressure. When you cant feel your foot normally, it is easy to injure your skin, bones and joints without knowing it. For these reasons diabetes increases the risk of fungal infections, bunions and ulcers. Deep ulcers can lead to bone infection. Gangrene is the most serious foot complication of diabetes. It usually occurs on the tips of the toes as blacked areas of skin. The black area is dead tissue. In severe cases, gangrene spreads to involve the entire toe, other toes and the entire foot. Foot or toe amputation may be required. Good foot care and blood sugar control can prevent this.    Home Care  Wear comfortable, proper fitting  shoes.  Wash your feet daily with warm water and mild soap.  After drying, apply a moisturizing cream or lotion.  Check your feet daily for skin breaks, blisters, swelling, or redness. Look between your toes also.  Wear cotton socks and change them every day.  Trim toe nails carefully and do not cut your cuticles.  Strive to keep your blood sugar under control with a combination of medicines, diet and activity.  If you smoke and have diabetes, it is very important that you stop. Smoking reduces blood flow to your foot.  Avoid activities that increase your risk of foot injury:  Do not walk barefoot.  Do not use heating pads or hot water bottles on your feet.  Do not put your foot in a hot tub without first checking the temperature with your hand.  10) Schedule yearly foot exams.    Follow Up  with your doctor or as advised by our staff. Report any cut, puncture, scrape, other injury, blister, ingrown toenail or ulcer on your foot.    Get Prompt Medical Attention  if any of the following occur:  -- Open ulcer with pus draining from the wound  -- Increasing foot or leg pain  -- New areas of redness or swelling or tender areas of the foot    © 5306-5196 [employer]. [address] [place] [address], [place] [address]. All rights reserved. This information is not intended as a substitute for professional medical care. Always follow your healthcare professional's instructions.     General nail care measures for abnormal nails include:  Keeping nails trimmed short, but avoid overzealous trimming  Avoiding trauma   Avoiding contact irritants   Keeping nails dry (avoiding wet work)  Avoiding all nail cosmetics  Wearing shoes that fit well at the toe box.  Avoid tight shoes.

## 2022-11-28 NOTE — TELEPHONE ENCOUNTER
----- Message from Sobeida Burkett MA sent at 11/28/2022  1:03 PM CST -----  Type: Patient Call Back    Who called: Leticia - [facility]    What is the request in detail: [employer] supply order was faxed on Nov. 14th and they are still awaiting a response ..     Can the clinic reply by [facility]?No    Would the patient rather a call back or a response via [facility]? yes    Best call back number: [phone]

## 2022-11-28 NOTE — PROGRESS NOTES
Subjective:      Patient ID: Binta Yang is a 61 y.o. female.    Chief Complaint: Diabetic Foot Exam (Foot Exam/PCP Lori Weir MD 11/09/22)    Diabetes, increased risk amputation needing evaluation/management/optomization of foot care.    No current complaints.    Athletic lace up shoes.       Chief Complaint   Patient presents with    Diabetic Foot Exam     Foot Exam/PCP Lori Weir MD 11/09/22          Patient Active Problem List   Diagnosis    Depression    Normocytic anemia    Functional incontinence    Essential hypertension    Cyanocobalamin deficiency    Type 2 diabetes mellitus without complication, without long-term current use of insulin    Seasonal allergic rhinitis    Intertrigo    Colon polyp    Cholelithiasis    Internal hemorrhoids    Aortic atherosclerosis    Fatigue    Loose stools    Intellectual disability    Mild episode of recurrent major depressive disorder    Seizure    Type 2 diabetes mellitus with cataract    Overweight (BMI 25.0-29.9)         Current Outpatient Medications on File Prior to Visit   Medication Sig Dispense Refill    atorvastatin (LIPITOR) 40 MG tablet Take 1 tablet (40 mg total) by mouth once daily. 90 tablet 3    empagliflozin (JARDIANCE) 10 mg tablet Take 1 tablet (10 mg total) by mouth once daily. 30 tablet 11    fexofenadine (ALLEGRA) 180 MG tablet Take 1 tablet (180 mg total) by mouth once daily. 30 tablet 11    flash glucose scanning reader (FREESTYLE ALEJANDRO 14 DAY READER) Misc Check blood glucose before meals and nightly. 1 each 0    flash glucose sensor (FREESTYLE ALEJANDRO 14 DAY SENSOR) Kit Apply to skin for 14 days.  Check blood glucose before meals and nightly. 1 kit 11    levetiracetam XR (KEPPRA XR) 750 mg Tb24 tablet Take 1 tablet (750 mg total) by mouth every evening. 90 tablet 1    amLODIPine (NORVASC) 10 MG tablet Take 1 tablet (10 mg total) by mouth once daily. 90 tablet 3     No current facility-administered medications on file prior to visit.          Review of patient's allergies indicates:  No Known Allergies        Review of Systems   Constitutional: Negative for chills, diaphoresis, fever, malaise/fatigue and night sweats.   Cardiovascular:  Negative for claudication, cyanosis, leg swelling and syncope.   Skin:  Negative for color change, dry skin, nail changes, rash, suspicious lesions and unusual hair distribution.   Musculoskeletal:  Negative for falls, joint pain, joint swelling, muscle cramps, muscle weakness and stiffness.   Gastrointestinal:  Negative for constipation, diarrhea, nausea and vomiting.   Neurological:  Negative for brief paralysis, disturbances in coordination, focal weakness, numbness, paresthesias, sensory change and tremors.         Objective:      Physical Exam  Constitutional:       General: She is not in acute distress.     Appearance: She is well-developed. She is not diaphoretic.     Cardiovascular:      Pulses:           Popliteal pulses are 2+ on the right side and 2+ on the left side.        Dorsalis pedis pulses are 2+ on the right side and 2+ on the left side.        Posterior tibial pulses are 2+ on the right side and 2+ on the left side.      Comments: Capillary refill 3 seconds all toes/distal feet, all toes/both feet warm to touch.      Negative lymphadenopathy bilateral popliteal fossa and tarsal tunnel.      Negavie lower extremity edema bilateral.    Musculoskeletal:      Right ankle: No swelling, deformity, ecchymosis or lacerations. Normal range of motion. Normal pulse.      Right Achilles Tendon: Normal. No defects. Ornelas's test negative.      Comments: Patient has hammertoes of digit 2 left                   partially reducible without symptom today.     Otherwise, Normal angle, base, station of gait. All ten toes without clubbing, cyanosis, or signs of ischemia.  No pain to palpation bilateral lower extremities.  Range of motion, stability, muscle strength, and muscle tone normal bilateral feet and  legs.        Lymphadenopathy:      Lower Body: No right inguinal adenopathy. No left inguinal adenopathy.      Comments: Negative lymphadenopathy bilateral popliteal fossa and tarsal tunnel.    Negative lymphangitic streaking bilateral feet/ankles/legs.   Skin:     General: Skin is warm and dry.      Capillary Refill: Capillary refill takes 2 to 3 seconds.      Coloration: Skin is not pale.      Findings: No abrasion, bruising, burn, ecchymosis, erythema, laceration, lesion or rash.      Nails: There is no clubbing.      Comments:   Skin is normal age and health appropriate color, turgor, texture, and temperature bilateral lower extremities without ulceration, hyperpigmentation, discoloration, masses nodules or cords palpated.  No ecchymosis, erythema, edema, or cardinal signs of infection bilateral lower extremities.       Neurological:      Mental Status: She is alert and oriented to person, place, and time.      Sensory: No sensory deficit.      Motor: No tremor, atrophy or abnormal muscle tone.      Gait: Gait normal.      Deep Tendon Reflexes:      Reflex Scores:       Patellar reflexes are 2+ on the right side and 2+ on the left side.       Achilles reflexes are 2+ on the right side and 2+ on the left side.     Comments: Negative tinel sign to percussion sural, superficial peroneal, deep peroneal, saphenous, and posterior tibial nerves right and left ankles and feet.            Assessment:       Encounter Diagnoses   Name Primary?    Type 2 diabetes mellitus without complication, without long-term current use of insulin     Encounter for diabetic foot exam Yes         Plan:       Binta was seen today for diabetic foot exam.    Diagnoses and all orders for this visit:    Encounter for diabetic foot exam    Type 2 diabetes mellitus without complication, without long-term current use of insulin  -     Ambulatory referral/consult to Podiatry    I counseled the patient on her conditions, their implications and  medical management.    The patient has received literature on basic diabetic foot care.  Patient will inspect feet daily, wear protective shoe gear when ambulatory, and apply moisturizer to skin as needed to maintain elasticity and help prevent ulceration.    Discussed conservative treatment with shoes of adequate dimensions, material, and style to alleviate symptoms and delay or prevent surgical intervention.    Annual diabetes foot exam.

## 2022-12-01 DIAGNOSIS — I10 ESSENTIAL HYPERTENSION: ICD-10-CM

## 2022-12-01 DIAGNOSIS — E11.36 TYPE 2 DIABETES MELLITUS WITH CATARACT: Primary | ICD-10-CM

## 2022-12-01 NOTE — TELEPHONE ENCOUNTER
Care Due:                  Date            Visit Type   Department     Provider  --------------------------------------------------------------------------------                                EP -                              PRIMARY      [facility] INTERNAL  Last Visit: 08-      CARE ([facility])   MEDICINE       Lori Weir  Next Visit: None Scheduled  None         None Found                                                            Last  Test          Frequency    Reason                     Performed    Due Date  --------------------------------------------------------------------------------    HBA1C.......  6 months...  empagliflozin............  08- 02-    [facility] Embedded Care Gaps. Reference number: [id number]. 12/01/2022   11:26:25 AM CST

## 2022-12-02 RX ORDER — AMLODIPINE BESYLATE 10 MG/1
TABLET ORAL
Qty: 90 TABLET | Refills: 3 | Status: SHIPPED | OUTPATIENT
Start: 2022-12-02 | End: 2023-06-07 | Stop reason: SDUPTHER

## 2022-12-02 NOTE — TELEPHONE ENCOUNTER
Refill Routing Note   Medication(s) are not appropriate for processing by [facility] for the following reason(s):      - Required vitals are abnormal    [facility] action(s):  Defer Medication-related problems identified: Requires labs        Medication reconciliation completed: No     Appointments  past 12m or future 3m with PCP    Date Provider   Last Visit   8/26/2022 Lori Weir MD   Next Visit   Visit date not found Lori Weir MD   ED visits in past 90 days: 0        Note composed:6:38 PM 12/01/2022

## 2022-12-02 NOTE — TELEPHONE ENCOUNTER
I noticed that her last blood pressure number in our records was high.  Please ask the patient what her home blood pressure numbers have been.  Please add it in the remote BP vitals and let me know.     Please schedule an in person appointment for DM, HTN.    Please schedule lab anytime before our visit.  Thanks!

## 2022-12-06 ENCOUNTER — TELEPHONE (OUTPATIENT)
Dept: INTERNAL MEDICINE | Facility: CLINIC | Age: 62
End: 2022-12-06
Payer: MEDICARE

## 2022-12-06 NOTE — TELEPHONE ENCOUNTER
----- Message from Leora Vergara sent at 12/6/2022 12:14 PM CST -----  Type: Patient Call Back    Who called:Jazmin/ [facility]    What is the request in detail: Calling regarding [employer] order that was faxed on Nov. 14. States a response was never given.     Can the clinic reply by [facility]? no    Would the patient rather a call back or a response via [facility]? Call back    Best call back number: [phone]      Additional Information:

## 2022-12-07 NOTE — TELEPHONE ENCOUNTER
Spoke to Nate at [facility]    He stated that a fax was sent to the office a few hours ago. He reached out to a  to see if there was another method of communication since we are not receiving the faxes. He is going to attempt to re-fax order as well.

## 2022-12-16 ENCOUNTER — TELEPHONE (OUTPATIENT)
Dept: INTERNAL MEDICINE | Facility: CLINIC | Age: 62
End: 2022-12-16
Payer: MEDICARE

## 2022-12-16 NOTE — TELEPHONE ENCOUNTER
Spoke to [facility] to inform them that paperwork was received and faxed back to [phone].

## 2022-12-16 NOTE — TELEPHONE ENCOUNTER
----- Message from Maya Morales MA sent at 12/16/2022 10:53 AM CST -----    ----- Message -----  From: Hammad Gross  Sent: 12/16/2022   8:31 AM CST  To: [facility] Staff        Name of Who is Calling: NOVA MARCUS [[id number]]      What is the request in detail: [facility] called to confirm a fax for the pt.Please contact to further discuss and advise.          Can the clinic reply by [facility]: N      What Number to Call Back [facility]: [facility] [phone]

## 2023-01-12 ENCOUNTER — TELEPHONE (OUTPATIENT)
Dept: INTERNAL MEDICINE | Facility: CLINIC | Age: 63
End: 2023-01-12
Payer: MEDICARE

## 2023-01-12 NOTE — TELEPHONE ENCOUNTER
Signed the 90L form, but please answer the mental status questions with her family.  Please also print my clinic note from 8/26/22.  90L form given to Wing to complete. Thank you!

## 2023-01-25 ENCOUNTER — CLINICAL SUPPORT (OUTPATIENT)
Dept: AUDIOLOGY | Facility: CLINIC | Age: 63
End: 2023-01-25
Payer: MEDICARE

## 2023-01-25 ENCOUNTER — OFFICE VISIT (OUTPATIENT)
Dept: OTOLARYNGOLOGY | Facility: CLINIC | Age: 63
End: 2023-01-25
Payer: MEDICARE

## 2023-01-25 VITALS — WEIGHT: 161.63 LBS | BODY MASS INDEX: 30.53 KG/M2

## 2023-01-25 DIAGNOSIS — H90.3 SENSORINEURAL HEARING LOSS, BILATERAL: Primary | ICD-10-CM

## 2023-01-25 DIAGNOSIS — H91.90 HEARING LOSS, UNSPECIFIED HEARING LOSS TYPE, UNSPECIFIED LATERALITY: ICD-10-CM

## 2023-01-25 PROCEDURE — 92557 COMPREHENSIVE HEARING TEST: CPT | Mod: PBBFAC

## 2023-01-25 PROCEDURE — 99999 PR PBB SHADOW E&M-EST. PATIENT-LVL I: CPT | Mod: PBBFAC,,, | Performed by: OTOLARYNGOLOGY

## 2023-01-25 PROCEDURE — 99213 OFFICE O/P EST LOW 20 MIN: CPT | Mod: S$PBB,,, | Performed by: OTOLARYNGOLOGY

## 2023-01-25 PROCEDURE — 99999 PR PBB SHADOW E&M-EST. PATIENT-LVL I: ICD-10-PCS | Mod: PBBFAC,,,

## 2023-01-25 PROCEDURE — 99211 OFF/OP EST MAY X REQ PHY/QHP: CPT | Mod: PBBFAC,27 | Performed by: OTOLARYNGOLOGY

## 2023-01-25 PROCEDURE — 99213 PR OFFICE/OUTPT VISIT, EST, LEVL III, 20-29 MIN: ICD-10-PCS | Mod: S$PBB,,, | Performed by: OTOLARYNGOLOGY

## 2023-01-25 PROCEDURE — 99999 PR PBB SHADOW E&M-EST. PATIENT-LVL I: CPT | Mod: PBBFAC,,,

## 2023-01-25 PROCEDURE — 99999 PR PBB SHADOW E&M-EST. PATIENT-LVL I: ICD-10-PCS | Mod: PBBFAC,,, | Performed by: OTOLARYNGOLOGY

## 2023-01-25 PROCEDURE — 99211 OFF/OP EST MAY X REQ PHY/QHP: CPT | Mod: PBBFAC

## 2023-01-25 PROCEDURE — 92567 TYMPANOMETRY: CPT | Mod: PBBFAC

## 2023-01-25 NOTE — PROGRESS NOTES
Subjective:       Patient ID: Binta Yagn is a 62 y.o. female.    Chief Complaint: Hearing loss    Hearing Loss:   Chronicity:  New  Onset:  More than 1 month ago  Progression since onset:  Gradually worsening (Seen by PCP 10/4/2021 for annual health assessment and referred to ENT to assess hearing level.)  Frequency:  Constantly  Severity:  Moderate  Hearing loss characteristics:  Moderate, muffled, trouble hearing TV, difficult on telephone and worse background noiseNo dizziness, no ear pain, no fever, no headaches and no rhinorrhea.  Aggravated by:  Noise  Treatments tried:  NothingNo dizziness.  Review of Systems   Constitutional:  Negative for activity change, appetite change and fever.   HENT:  Positive for hearing loss. Negative for congestion, ear discharge, ear pain, nosebleeds, postnasal drip, rhinorrhea and sneezing.    Eyes:  Negative for redness and visual disturbance.   Respiratory:  Negative for apnea, cough, shortness of breath and wheezing.    Cardiovascular:  Negative for chest pain and palpitations.   Gastrointestinal:  Negative for diarrhea and vomiting.   Genitourinary:  Negative for difficulty urinating and frequency.   Musculoskeletal:  Negative for arthralgias, back pain, gait problem and neck pain.   Skin:  Negative for color change and rash.   Neurological:  Negative for dizziness, speech difficulty, weakness and headaches.   Hematological:  Negative for adenopathy. Does not bruise/bleed easily.   Psychiatric/Behavioral:  Negative for agitation and behavioral problems.      Objective:        Constitutional:   Vital signs are normal. She appears well-developed and well-nourished. She is active. Normal speech.      Head:  Normocephalic and atraumatic. Salivary glands normal.  Facial strength is normal.      Ears:  Hearing normal to normal and whispered voice; external ear normal without scars, lesions, or masses; ear canal, tympanic membrane, and middle ear normal..     Nose:  Nose normal  including turbinates, nasal mucosa, sinuses and nasal septum.     Mouth/Throat  Oropharynx clear and moist without lesions or asymmetry and normal uvula midline.     Neck:  Neck normal without thyromegaly masses, asymmetry, normal tracheal structure, crepitus, and tenderness, thyroid normal, trachea normal, phonation normal and full range of motion with neck supple.     Psychiatric:   She has a normal mood and affect. Her speech is normal and behavior is normal.     Neurological:   She has neurological normal, alert and oriented.     Skin:   No abrasions, lacerations, lesions, or rashes.       As a result of this patients history and examination findings, a comprehensive audiogram was ordered to determine the level of hearing/hearing loss.        Symmetric HFSNHL with Type-A tympanograms.         Assessment:       1. Hearing loss, unspecified hearing loss type, unspecified laterality      Low risk/ HF ayssm mostly conductive.  Plan:       1. Hearing conservation strongly recommended.  2. Trial of amplification bilaterally also recommended. Instructions to contact [facility] for assistance in gaining hearing aids.  3. Re-check of hearing in 18-24 months or sooner if subjective change noted.  4. F/U with PCP as per schedule.

## 2023-01-25 NOTE — PROGRESS NOTES
Binta Yang was seen today in the clinic for an audiologic evaluation.  Patient's main complaint was continued decreased hearing bilaterally. Ms. Yang reported no perceived changes in hearing since her last audiogram in 2021. She denied tinnitus, otalgia, aural fullness, dizziness, and history of noise exposure.    Tympanometry revealed Type A in the right ear and Type A in the left ear.     Audiogram results revealed a mild to moderate sensorineural hearing loss (SNHL) in the right ear and a mild to severe SNHL in the left ear. There was a significant asymmetry noted between ears from 8128-7922 Hz, with the left ear poorer than the right ear.    Speech reception thresholds were noted at 20 dBHL in the right ear and 25 dBHL in the left ear.    Speech discrimination scores were 100% in the right ear and 92% in the left ear.    Recommendations:  Otologic evaluation  Hearing aid consultation pending medical clearance  Annual audiogram or sooner if change perceived  Hearing protection in noise

## 2023-01-30 ENCOUNTER — OFFICE VISIT (OUTPATIENT)
Dept: OPTOMETRY | Facility: CLINIC | Age: 63
End: 2023-01-30
Payer: MEDICARE

## 2023-01-30 DIAGNOSIS — E11.9 DIABETES MELLITUS WITHOUT COMPLICATION: Primary | ICD-10-CM

## 2023-01-30 DIAGNOSIS — H25.13 NUCLEAR SCLEROSIS OF BOTH EYES: ICD-10-CM

## 2023-01-30 PROCEDURE — 99212 OFFICE O/P EST SF 10 MIN: CPT | Mod: PBBFAC | Performed by: OPTOMETRIST

## 2023-01-30 PROCEDURE — 99999 PR PBB SHADOW E&M-EST. PATIENT-LVL II: CPT | Mod: PBBFAC,,, | Performed by: OPTOMETRIST

## 2023-01-30 PROCEDURE — 92014 COMPRE OPH EXAM EST PT 1/>: CPT | Mod: S$PBB,ICN,, | Performed by: OPTOMETRIST

## 2023-01-30 PROCEDURE — 99999 PR PBB SHADOW E&M-EST. PATIENT-LVL II: ICD-10-PCS | Mod: PBBFAC,,, | Performed by: OPTOMETRIST

## 2023-01-30 PROCEDURE — 92014 PR EYE EXAM, EST PATIENT,COMPREHESV: ICD-10-PCS | Mod: S$PBB,ICN,, | Performed by: OPTOMETRIST

## 2023-02-06 NOTE — PROGRESS NOTES
INSESA    BUCK: 1 yrs; Pt presents today with her family member due to cognitive   level  Glasses? No has them doesn't wear them   Contacts? no  H/o eye surgery, injections or laser: no  H/o eye injury: no  Known eye conditions? no  Family h/o eye conditions? no  Eye gtts? no  (-) Flashes (-) Floaters (-) Mucous   (-) Tearing (-) Itching (-) Burning   (-) Headaches (-) Eye Pain/discomfort (-) Irritation   (-) Redness (-) Double vision (-) Blurry vision  Hemoglobin A1C       Date                     Value               Ref Range             Status                08/26/2022               7.7 (H)             4.0 - 5.6 %           Final            Last edited by Myranda Muller, OD on 2/6/2023 11:51 AM.            Assessment /Plan     For exam results, see Encounter Report.    Diabetes mellitus without complication  -     Ambulatory referral/consult to Optometry    Nuclear sclerosis of both eyes      1. No retinopathy noted today.  Continued control with primary care physician and annual comprehensive eye exam.   2. Educated pt on findings. Not visually significant. No need for removal at this time. Monitor yearly.    Eyeglass Final Rx       Eyeglass Final Rx         Sphere Cylinder Axis    Right -2.00 +1.50 180    Left -0.25 +0.25 070      Type: DVO    Expiration Date: 1/30/2024                     RTC in 1 year for annual eye exam unless changes noted sooner.

## 2023-04-18 ENCOUNTER — PATIENT MESSAGE (OUTPATIENT)
Dept: ADMINISTRATIVE | Facility: HOSPITAL | Age: 63
End: 2023-04-18
Payer: MEDICARE

## 2023-05-03 DIAGNOSIS — Z71.89 COMPLEX CARE COORDINATION: ICD-10-CM

## 2023-05-27 ENCOUNTER — PATIENT MESSAGE (OUTPATIENT)
Dept: ADMINISTRATIVE | Facility: HOSPITAL | Age: 63
End: 2023-05-27
Payer: MEDICARE

## 2023-06-07 DIAGNOSIS — E11.9 TYPE 2 DIABETES MELLITUS WITHOUT COMPLICATION, WITHOUT LONG-TERM CURRENT USE OF INSULIN: ICD-10-CM

## 2023-06-07 DIAGNOSIS — I10 ESSENTIAL HYPERTENSION: ICD-10-CM

## 2023-06-07 DIAGNOSIS — I70.0 AORTIC ATHEROSCLEROSIS: ICD-10-CM

## 2023-06-07 NOTE — TELEPHONE ENCOUNTER
----- Message from [place] sent at 6/7/2023  3:10 PM CDT -----  Regarding: CAll BAck  Name of Who is Calling: Astrid Brother              What is the request in detail: Astrid requesting a call back about pt medication. States she's running out Pt is a patient of Dr Weir.Please assist              Can the clinic reply by [facility]: No              What Number to Call Back if not in [facility]: [phone]

## 2023-06-07 NOTE — TELEPHONE ENCOUNTER
Care Due:                  Date            Visit Type   Department     Provider  --------------------------------------------------------------------------------                                EP -                              PRIMARY      [facility] INTERNAL  Last Visit: 08-      CARE ([facility])   MEDICINE       Lori Weir                              EP -                              PRIMARY      [facility] INTERNAL  Next Visit: 08-      CARE ([facility])   MEDICINE       Rose Li                                                            Last  Test          Frequency    Reason                     Performed    Due Date  --------------------------------------------------------------------------------    CMP.........  12 months..  atorvastatin,              05- 05-                             empagliflozin............    HBA1C.......  6 months...  empagliflozin............  08- 02-    Lipid Panel.  12 months..  atorvastatin.............  05- 05-    [facility] Embedded Care Due Messages. Reference number: [id number].   6/07/2023 3:38:47 PM CDT

## 2023-06-08 RX ORDER — ATORVASTATIN CALCIUM 40 MG/1
40 TABLET, FILM COATED ORAL DAILY
Qty: 90 TABLET | Refills: 0 | Status: SHIPPED | OUTPATIENT
Start: 2023-06-08 | End: 2023-09-13 | Stop reason: SDUPTHER

## 2023-06-08 RX ORDER — LEVETIRACETAM 750 MG/1
750 TABLET, EXTENDED RELEASE ORAL NIGHTLY
Qty: 90 TABLET | Refills: 0 | Status: SHIPPED | OUTPATIENT
Start: 2023-06-08 | End: 2023-09-13 | Stop reason: SDUPTHER

## 2023-06-08 RX ORDER — AMLODIPINE BESYLATE 10 MG/1
10 TABLET ORAL DAILY
Qty: 90 TABLET | Refills: 0 | Status: SHIPPED | OUTPATIENT
Start: 2023-06-08 | End: 2023-09-13 | Stop reason: SDUPTHER

## 2023-09-13 DIAGNOSIS — I10 ESSENTIAL HYPERTENSION: ICD-10-CM

## 2023-09-13 DIAGNOSIS — E11.9 TYPE 2 DIABETES MELLITUS WITHOUT COMPLICATION, WITHOUT LONG-TERM CURRENT USE OF INSULIN: ICD-10-CM

## 2023-09-13 DIAGNOSIS — I70.0 AORTIC ATHEROSCLEROSIS: ICD-10-CM

## 2023-09-13 NOTE — TELEPHONE ENCOUNTER
Care Due:                  Date            Visit Type   Department     Provider  --------------------------------------------------------------------------------                                EP -                              PRIMARY      [facility] INTERNAL  Last Visit: 08-      CARE ([facility])   MEDICINE       Lori Weir                              EP -                              PRIMARY      [facility] INTERNAL  Next Visit: 11-      CARE ([facility])   MEDICINE       Rose Li                                                            Last  Test          Frequency    Reason                     Performed    Due Date  --------------------------------------------------------------------------------    CMP.........  12 months..  atorvastatin,              05- 05-                             empagliflozin............    HBA1C.......  6 months...  empagliflozin............  08- 02-    Lipid Panel.  12 months..  atorvastatin.............  05- 05-    [facility] Embedded Care Due Messages. Reference number: [id number].   9/13/2023 4:29:39 PM CDT

## 2023-09-14 ENCOUNTER — OFFICE VISIT (OUTPATIENT)
Dept: PSYCHIATRY | Facility: CLINIC | Age: 63
End: 2023-09-14
Payer: MEDICARE

## 2023-09-14 VITALS
WEIGHT: 157.5 LBS | BODY MASS INDEX: 29.74 KG/M2 | SYSTOLIC BLOOD PRESSURE: 142 MMHG | HEIGHT: 61 IN | DIASTOLIC BLOOD PRESSURE: 69 MMHG | HEART RATE: 81 BPM

## 2023-09-14 DIAGNOSIS — F43.21 GRIEF: ICD-10-CM

## 2023-09-14 DIAGNOSIS — F33.1 MAJOR DEPRESSIVE DISORDER, RECURRENT, MODERATE: Primary | ICD-10-CM

## 2023-09-14 DIAGNOSIS — F79 INTELLECTUAL DISABILITY: ICD-10-CM

## 2023-09-14 DIAGNOSIS — E11.9 TYPE 2 DIABETES MELLITUS WITHOUT COMPLICATION, WITHOUT LONG-TERM CURRENT USE OF INSULIN: Primary | ICD-10-CM

## 2023-09-14 PROCEDURE — 99205 PR OFFICE/OUTPT VISIT, NEW, LEVL V, 60-74 MIN: ICD-10-PCS | Mod: S$PBB,,, | Performed by: NURSE PRACTITIONER

## 2023-09-14 PROCEDURE — 99999 PR PBB SHADOW E&M-EST. PATIENT-LVL II: ICD-10-PCS | Mod: PBBFAC,,, | Performed by: NURSE PRACTITIONER

## 2023-09-14 PROCEDURE — 99999 PR PBB SHADOW E&M-EST. PATIENT-LVL II: CPT | Mod: PBBFAC,,, | Performed by: NURSE PRACTITIONER

## 2023-09-14 PROCEDURE — 99212 OFFICE O/P EST SF 10 MIN: CPT | Mod: PBBFAC | Performed by: NURSE PRACTITIONER

## 2023-09-14 PROCEDURE — 99205 OFFICE O/P NEW HI 60 MIN: CPT | Mod: S$PBB,,, | Performed by: NURSE PRACTITIONER

## 2023-09-14 RX ORDER — LEVETIRACETAM 750 MG/1
750 TABLET, EXTENDED RELEASE ORAL NIGHTLY
Qty: 90 TABLET | Refills: 0 | Status: SHIPPED | OUTPATIENT
Start: 2023-09-14 | End: 2023-12-13

## 2023-09-14 RX ORDER — AMLODIPINE BESYLATE 10 MG/1
10 TABLET ORAL DAILY
Qty: 90 TABLET | Refills: 0 | Status: SHIPPED | OUTPATIENT
Start: 2023-09-14

## 2023-09-14 RX ORDER — ATORVASTATIN CALCIUM 40 MG/1
40 TABLET, FILM COATED ORAL DAILY
Qty: 90 TABLET | Refills: 0 | Status: SHIPPED | OUTPATIENT
Start: 2023-09-14 | End: 2023-12-13

## 2023-09-14 RX ORDER — FLUOXETINE HYDROCHLORIDE 20 MG/1
20 CAPSULE ORAL DAILY
Qty: 90 CAPSULE | Refills: 1 | Status: SHIPPED | OUTPATIENT
Start: 2023-09-14

## 2023-09-14 NOTE — TELEPHONE ENCOUNTER
----- Message from Tawnya Ryan sent at 9/13/2023  4:00 PM CDT -----  Regarding: sooner appointment  Name of Who is Calling: alisha Shaver           What is the request in detail: Chhaya is requesting a call back to find out if an early appointment is still available. She missed the portal message to call and see if it was still available.            Can the clinic reply by [facility]: Yes           What Number to Call Back if not in [facility]: [phone]

## 2023-09-14 NOTE — PROGRESS NOTES
Outpatient Psychiatry Initial Visit (MD/NP)    9/14/2023    Binta Yang, a 62 y.o. female, presenting for initial evaluation visit. Met with patient and her brother (caregiver    Reason for Encounter: self-referral. Patient complains of depression. .    History of Present Illness:     Pt is a 62 year old female who presents for psychiatric evaluation. Pt presents with blunted affect and poverty of speech. Accompanied by her brother who provided most of history.  He reports that he and his wife are her caregivers and they have been concerned about her behaviors/      Pt lost her daughter 7 years ago. Pt has not been on psych medications since 2019 when she was seeing Dr. Muñoz for same reason. According to his records she did well on Prozac.     Depression sx: not wanting to get up in morning, not taking care of herself or bathe, soiled clothing, not wanting to leave house or participate in activites. Often reacts hostile when directed to do things. Hides her soiled clothers or will hang     Pt is quiet with poand answers only brief one-word responses.     No hx of inpatient care.     No hx of self harming    Appetite is good. Sleeps well.      Psychiatric Medications: currently taking (none)    Past trials include: Prozac, Geodon (caused lethargy)    Psychosocial History: Lives with brother and his wife.   Tobacco-none  Etoh+ none  Drugs=none    Medical History: HTN, Intellectual disability, hx of seizures    Review Of Systems:     GENERAL:  No weight gain or loss  SKIN:  No rashes or lacerations  HEAD:  No headaches  EYES:  No exophthalmos, jaundice or blindness  EARS:  No dizziness, tinnitus or hearing loss  NOSE:  No changes in smell  MOUTH & THROAT:  No dyskinetic movements or obvious goiter  CHEST:  No shortness of breath, hyperventilation or cough  CARDIOVASCULAR:  No tachycardia or chest pain  ABDOMEN:  No nausea, vomiting, pain, constipation or diarrhea  URINARY:  No frequency, dysuria or sexual  "dysfunction  ENDOCRINE:  No polydipsia, polyuria  MUSCULOSKELETAL:  No pain or stiffness of the joints  NEUROLOGIC:  No weakness, sensory changes, seizures, confusion, memory loss, tremor or other abnormal movements    Current Evaluation:     Nutritional Screening: Considering the patient's height and weight, medications, medical history and preferences, should a referral be made to the dietitian? no    Constitutional  Vitals:  Most recent vital signs, dated greater than 90 days prior to this appointment, were reviewed.    Vitals:    09/14/23 0905   BP: (!) 142/69   Pulse: 81   Weight: 71.5 kg (157 lb 8.3 oz)   Height: 5' 1" (1.549 m)        General:  unremarkable, age appropriate     Musculoskeletal  Muscle Strength/Tone:  Some akathesia   Gait & Station:  non-ataxic     Psychiatric  Speech:  Poverty of speech   Mood & Affect:  steady  congruent and appropriate   Thought Process:  normal and logical   Associations:  intact   Thought Content:  normal, no suicidality, no homicidality, delusions, or paranoia   Insight:  limited awareness of illness   Judgement: behavior is adequate to circumstances   Orientation:  grossly intact   Memory: intact for content of interview   Language: grossly intact   Attention Span & Concentration:  able to focus   Fund of Knowledge:  limited       Relevant Elements of Neurological Exam: normal gait    Functioning in Relationships:  Spouse/partner: see above HPI  Peers: see above HPI  Employers: see above HPI    Laboratory Data  No visits with results within 1 Month(s) from this visit.   Latest known visit with results is:   Lab Visit on 08/26/2022   Component Date Value Ref Range Status    Hemoglobin A1C 08/26/2022 7.7 (H)  4.0 - 5.6 % Final    Estimated Avg Glucose 08/26/2022 174 (H)  68 - 131 mg/dL Final         Medications  Outpatient Encounter Medications as of 9/14/2023   Medication Sig Dispense Refill    amLODIPine (NORVASC) 10 MG tablet Take 1 tablet (10 mg total) by mouth once " daily. 90 tablet 0    atorvastatin (LIPITOR) 40 MG tablet Take 1 tablet (40 mg total) by mouth once daily. 90 tablet 0    empagliflozin (JARDIANCE) 10 mg tablet Take 1 tablet (10 mg total) by mouth once daily. 90 tablet 0    fexofenadine (ALLEGRA) 180 MG tablet Take 1 tablet (180 mg total) by mouth once daily. 30 tablet 11    flash glucose scanning reader (FREESTYLE ALEJANDRO 14 DAY READER) Misc Check blood glucose before meals and nightly. 1 each 0    flash glucose sensor (FREESTYLE ALEJANDRO 14 DAY SENSOR) Kit Apply to skin for 14 days.  Check blood glucose before meals and nightly. 1 kit 11    FLUoxetine 20 MG capsule Take 1 capsule (20 mg total) by mouth once daily. 90 capsule 1    levetiracetam XR (KEPPRA XR) 750 mg Tb24 tablet Take 1 tablet (750 mg total) by mouth every evening. 90 tablet 0     No facility-administered encounter medications on file as of 9/14/2023.       Assessment - Diagnosis - Goals:     Impression:       ICD-10-CM ICD-9-CM   1. Major depressive disorder, recurrent, moderate  F33.1 296.32   2. Grief  F43.21 309.0   3. Intellectual disability  F79 319       Strengths and Liabilities: Strength: Patient accepts guidance/feedback, Strength: Patient has positive support network., Strength: Patient has reasonable judgment., Strength: Patient is stable.    Treatment Goals:  Specify outcomes written in observable, behavioral terms:   Depression: reducing fatigue and reducing negative automatic thoughts    Treatment Plan/Recommendations:   Medication Management: The risks and benefits of medication were discussed with the patient.  The treatment plan and follow up plan were reviewed with the patient.  Reviewed available medical records and labs  Re start Prozac 20 mg daily  Follow-up with PCP for other medications. Pt has appt in November.  Follow-up with me in 6-8 weeks.   Counseling this visit focused on building adaptive coping skills, medication teaching, and cognitive behavioral therapy (CBT)    Return  to Clinic: 6 weeks    Counseling time: 35 minutes  Total time: 60 minutes

## 2023-09-26 ENCOUNTER — LAB VISIT (OUTPATIENT)
Dept: LAB | Facility: OTHER | Age: 63
End: 2023-09-26
Attending: INTERNAL MEDICINE
Payer: MEDICARE

## 2023-09-26 ENCOUNTER — CLINICAL SUPPORT (OUTPATIENT)
Dept: DIABETES | Facility: CLINIC | Age: 63
End: 2023-09-26
Payer: MEDICARE

## 2023-09-26 VITALS — BODY MASS INDEX: 30.28 KG/M2 | WEIGHT: 160.25 LBS

## 2023-09-26 DIAGNOSIS — E11.9 TYPE 2 DIABETES MELLITUS WITHOUT COMPLICATION, WITHOUT LONG-TERM CURRENT USE OF INSULIN: ICD-10-CM

## 2023-09-26 DIAGNOSIS — E11.36 TYPE 2 DIABETES MELLITUS WITH CATARACT: ICD-10-CM

## 2023-09-26 LAB
ESTIMATED AVG GLUCOSE: 160 MG/DL (ref 68–131)
HBA1C MFR BLD: 7.2 % (ref 4–5.6)

## 2023-09-26 PROCEDURE — 36415 COLL VENOUS BLD VENIPUNCTURE: CPT | Performed by: INTERNAL MEDICINE

## 2023-09-26 PROCEDURE — G0108 DIAB MANAGE TRN  PER INDIV: HCPCS | Mod: PBBFAC | Performed by: DIETITIAN, REGISTERED

## 2023-09-26 PROCEDURE — 83036 HEMOGLOBIN GLYCOSYLATED A1C: CPT | Performed by: INTERNAL MEDICINE

## 2023-09-26 PROCEDURE — 99211 OFF/OP EST MAY X REQ PHY/QHP: CPT | Mod: PBBFAC | Performed by: DIETITIAN, REGISTERED

## 2023-09-26 PROCEDURE — 99999PBSHW PR PBB SHADOW TECHNICAL ONLY FILED TO HB: Mod: PBBFAC,,,

## 2023-09-26 PROCEDURE — 99999 PR PBB SHADOW E&M-EST. PATIENT-LVL I: CPT | Mod: PBBFAC,,, | Performed by: DIETITIAN, REGISTERED

## 2023-09-26 PROCEDURE — 99999 PR PBB SHADOW E&M-EST. PATIENT-LVL I: ICD-10-PCS | Mod: PBBFAC,,, | Performed by: DIETITIAN, REGISTERED

## 2023-09-26 PROCEDURE — 99999PBSHW PR PBB SHADOW TECHNICAL ONLY FILED TO HB: ICD-10-PCS | Mod: PBBFAC,,,

## 2023-09-27 NOTE — PROGRESS NOTES
Diabetes Care Specialist Progress Note  Author: Mirna Timmons RD  Date: 9/27/2023    Program Intake  Reason for Diabetes Program Visit:: Initial Diabetes Assessment  Current diabetes risk level:: low  In the last 12 months, have you:: none  Permission to speak with others about care:: yes (sister in law here who is her main caretaker, brother is also here for support)    Lab Results   Component Value Date    HGBA1C 7.2 (H) 09/26/2023       Clinical    Weight: 72.7 kg (160 lb 4.4 oz)       Body mass index is 30.28 kg/m².    Patient Health Rating  Compared to other people your age, how would you rate your health?: Unable to state    Problem Review  Reviewed Problem List with Patient: no (see comments)  Active comorbidities affecting diabetes self-care.: yes  Comorbidities: Hypertension  Reviewed health maintenance: no (see comments)    Clinical Assessment  Current Diabetes Treatment: Oral Medication  Have you ever experienced hypoglycemia (low blood sugar)?: no  Have you ever experienced hyperglycemia (high blood sugar)?: no    Medication Information  How do you obtain your medications?: Family picks up  How many days a week do you miss your medications?: Never  Do you use a pill box or medication chart to help you manage your medications?: Other (see comment) (family despenses medications)  Do you sometimes have difficulty refilling your medications?: No  Medication adherence impacting ability to self-manage diabetes?: No    Labs  Do you have regular lab work to monitor your medications?: Yes  Type of Regular Lab Work: BMP, CBC, A1c, Cholesterol  Where do you get your labs drawn?: [facility]  Lab Compliance Barriers: No    Nutritional Status  Diet: Regular, Low cholesterol (family tries to avoid fried foods, bakes most meats, fried chicken on wednesdays after [employer]ian[employer])  Meal Plan 24 Hour Recall: Breakfast, Lunch, Dinner  Meal Plan 24 Hour Recall - Breakfast: wakes around 9 am eats captain crunch, fried egg with 2  pieces of toast with butter and water  Meal Plan 24 Hour Recall - Lunch: ham sandwich, with barron, cheese, chips or cookies with water  Meal Plan 24 Hour Recall - Dinner: family cooks dinner and she can reheat leftovers, ate salmon and mac n cheese on sunday  Change in appetite?: No  Dentation:: Intact (some broken teeth, needs dental work, hard to find dentist with patience for her situation, suggested [facility])  Recent Changes in Weight: No Recent Weight Change  Current nutritional status an area of need that is impacting patient's ability to self-manage diabetes?: No    Additional Social History    Support  Does anyone support you with your diabetes care?: yes  Who supports you?: family member (sister in law is her main care taker)  Who takes you to your medical appointments?: family member (sister in law and brother)  Does the current support meet the patient's needs?: Yes    Access to Mass Media & Technology  Does the patient have access to any of the following devices or technologies?: Internet Access  Media or technology needs impacting ability to self-manage diabetes?: No    Cognitive/Behavioral Health  Alert and Oriented: Yes  Difficulty Thinking: Yes (Intellectual disability)  Requires Prompting: Yes  Requires assistance for routine expression?: Yes  Cognitive or behavioral barriers impacting ability to self-manage diabetes?: Yes    Culture/Alevism  Culture or Mormonism beliefs that may impact ability to access healthcare: No    Communication  Language preference: English  Hearing Problems: No  Vision Problems: Yes  Vision problem type:: Decreased Vision  Vision Assistance: Glasses (does not wear her glasses)  Communication needs impacting ability to self-manage diabetes?: Yes    Health Literacy  Preferred Learning Method: Face to Face  How often do you need to have someone help you read instructions, pamphlets, or written material from your doctor or pharmacy?: Always  Health literacy needs  impacting ability to self-manage diabetes?: Yes (but family members take care of all of her needs)      Diabetes Self-Management Skills Assessment    Diabetes Disease Process/Treatment Options  Patient/caregiver able to state what happens when someone has diabetes.: somewhat  Patient/caregiver knows what type of diabetes they have.: no  Diabetes Type : Type II  Patient/caregiver able to identify at least three signs and symptoms of diabetes.: no  Patient able to identify at least three risk factors for diabetes.: no  Diabetes Disease Process/Treatment Options: Skills Assessment Completed: Yes  Assessment indicates:: Knowledge deficit, Instruction Needed  Area of need?: Yes    Nutrition/Healthy Eating  Challenges to healthy eating:: lack of will power  Method of carbohydrate measurement:: no method  Patient-identified foods:: sweets, soda  Nutrition/Healthy Eating Skills Assessment Completed:: Yes  Assessment indicates:: Knowledge deficit, Instruction Needed  Area of need?: Yes    Physical Activity/Exercise  Patient's daily activity level:: sedentary  Patient formally exercises outside of work.: no  Patient can identify forms of physical activity.: no  Patient can identify reasons why exercise/physical activity is important in diabetes management.: no  Physical Activity/Exercise Skills Assessment Completed: : Yes  Assessment indicates:: Knowledge deficit, Instruction Needed  Area of need?: Yes    Medications  Patient is able to describe current diabetes management routine.: yes  Diabetes management routine:: oral medications  Patient is able to identify current diabetes medications, dosages, and appropriate timing of medications.: yes  Assessment indicates:: Adequate understanding    Home Blood Glucose Monitoring  Patient states that blood sugar is checked at home daily.: no  Reasons for not monitoring:: finger pain  Home Blood Glucose Monitoring Skills Assessment Completed: : Yes  Assessment indicates:: Knowledge  deficit, Instruction Needed  Area of need?: Yes    Acute Complications  Patient is able to identify types of acute complications: No  Acute Complications Skills Assessment Completed: : Yes  Assessment indicates:: Knowledge deficit, Instruction Needed  Area of need?: Yes    Chronic Complications  Patient can identify major chronic complications of diabetes.: no  Patient can identify ways to prevent or delay diabetes complications.: no  Patient is aware that having diabetes increases risk of heart disease?: No  Patient is aware that heart disease is the leading cause of death and disability in people with diabetes?: No  Patient able to state risk factors for heart disease?: No  Patient is taking statin?: Yes  Chronic Complications Skills Assessment Completed: : Yes  Assessment indicates:: Knowledge deficit, Instruction Needed  Area of need?: Yes    Psychosocial/Coping  Patient can identify ways of coping with chronic disease.: yes  Patient-stated ways of coping with chronic disease:: support from loved ones, counseling/actively seeing behavioral professional (Very supportive family who act as her main caretakers. She is followed by [facility]. She has a pleasant demenor today.)  Psychosocial/Coping Skills Assessment Completed: : Yes  Assessment indicates:: Adequate understanding  Area of need?: No (family and psyc support)      Assessment Summary and Plan    Based on today's diabetes care assessment, the following areas of need were identified:          9/26/2023    12:01 AM   Social   Access to Mass Media/Tech No   Cognitive/Behavioral Health Yes   Culture/Quaker No   Communication Yes   Health Literacy Yes            9/26/2023    12:01 AM   Clinical   Medication Adherence No   Lab Compliance No   Nutritional Status No            9/26/2023    12:01 AM   Diabetes Self-Management Skills   Diabetes Disease Process/Treatment Options Yes-ed on risk factors associated with DM dx   Nutrition/Healthy Eating Yes-see care plan  "  Physical Activity/Exercise Yes-time deferred   Home Blood Glucose Monitoring Yes-family presents with Freestyle stephany. They are concerned that she will not tolerate wearing it on her arm and will rip it off. When asked if she wants to wear the monitor, she said no. A1c in goal range. No need to wear a CGM at this time.   Acute Complications Yes-ed to family on s/s, causes and Tx for hyperglycemia, no Hx of hyper or hypo in the recent past   Chronic Complications Yes-pt needs dental work but family cant find a dentist that is "patient enough." She has some broken teeth and needs a deep cleaning. Suggested they reach out to the [facility].   Psychosocial/Coping No          Today's interventions were provided through individual discussion, instruction, and written materials were provided.      Patient verbalized understanding of instruction and written materials.  Pt was able to return back demonstration of instructions today. Patient understood key points, needs reinforcement and further instruction.     Diabetes Self-Management Care Plan:    Today's Diabetes Self-Management Care Plan was developed with Binta's input. Adilsonleida has agreed to work toward the following goal(s) to improve his/her overall diabetes control.      Care Plan: Diabetes Management   Updates made since 8/28/2023 12:00 AM        Problem: Healthy Eating         Long-Range Goal: Family will continue to omit sweet beverages and will follow plate method    Start Date: 9/26/2023   Expected End Date: 12/4/2023   Priority: High   Barriers: Cognitive Deficits   Note:    9/26/23-Sister in law(main care taker) and brother are here. Her family has done a nice job trying to omit sweet beverages. She still enjoys cookies often. Like Oreos. She can fix her own cereal (Captain Crunch)in the morning and makes a sandwich with chips or oreos for herself at lunch. Buying low Na ham. Ed on carb choices and plate method. Suggested she limit oreos to a " serving of 2 or eat fresh fruit as a replacement. Also suggested switching Captain Crunch cereal with bran flakes with 1/2 banana or 1 box of raisins. Her sister in law states they will all benefit from following this meal plan.       Task: Reviewed the sources and role of Carbohydrate, Protein, and Fat and how each nutrient impacts blood sugar. Completed 9/27/2023        Task: Provided visual examples using dry measuring cups, food models, and other familiar objects such as computer mouse, deck or cards, tennis ball etc. to help with visualization of portions. Completed 9/27/2023        Task: Explained how to count carbohydrates using the food label and the use of dry measuring cups for accurate carb counting.         Task: Discussed strategies for choosing healthier menu options when dining out.         Task: Recommended replacing beverages containing high sugar content with noncaloric/sugar free options and/or water. Completed 9/27/2023        Task: Review the importance of balancing carbohydrates with each meal using portion control techniques to count servings of carbohydrate and label reading to identify serving size and amount of total carbs per serving.         Task: Provided Sample plate method and reviewed the use of the plate to estimate amounts of carbohydrate per meal. Completed 9/27/2023        Problem: Medications         Goal: Patient Agrees to take Diabetes Medication as prescribed.    Start Date: 9/26/2023   Expected End Date: 12/4/2023   Priority: Medium   Barriers: No Barriers Identified   Note:    9/26/23-currently taking meds daily with no issues.       Task: Reviewed with patient all current diabetes medications and provided basic review of the purpose, dosage, frequency, side effects, and storage of both oral and injectable diabetes medications. Completed 9/27/2023        Task: Reviewed possible resources for acquiring cost prohibitive medication.         Task: Discussed guidelines for  preventing, detecting and treating hypoglycemia and hyperglycemia and reviewed the importance of meal and medication timing with diabetes mediations for prevention of hypoglycemia and maximum drug benefit.           Follow Up Plan     No follow-ups on file.    Today's care plan and follow up schedule was discussed with patient.  Binta verbalized understanding of the care plan, goals, and agrees to follow up plan.        The patient was encouraged to communicate with his/her health care provider/physician and care team regarding his/her condition(s) and treatment.  I provided the patient with my contact information today and encouraged to contact me via phone or [facility] Patient Portal as needed.     Length of Visit   Total Time: 60 Minutes

## 2023-10-26 ENCOUNTER — OFFICE VISIT (OUTPATIENT)
Dept: PSYCHIATRY | Facility: CLINIC | Age: 63
End: 2023-10-26
Payer: MEDICARE

## 2023-10-26 VITALS
BODY MASS INDEX: 29.12 KG/M2 | SYSTOLIC BLOOD PRESSURE: 136 MMHG | HEART RATE: 85 BPM | WEIGHT: 154.13 LBS | DIASTOLIC BLOOD PRESSURE: 56 MMHG

## 2023-10-26 DIAGNOSIS — F79 INTELLECTUAL DISABILITY: ICD-10-CM

## 2023-10-26 DIAGNOSIS — F33.1 MAJOR DEPRESSIVE DISORDER, RECURRENT, MODERATE: Primary | ICD-10-CM

## 2023-10-26 DIAGNOSIS — F43.21 GRIEF: ICD-10-CM

## 2023-10-26 PROCEDURE — 90833 PSYTX W PT W E/M 30 MIN: CPT | Mod: ,,, | Performed by: NURSE PRACTITIONER

## 2023-10-26 PROCEDURE — 90833 PR PSYCHOTHERAPY W/PATIENT W/E&M, 30 MIN (ADD ON): ICD-10-PCS | Mod: ,,, | Performed by: NURSE PRACTITIONER

## 2023-10-26 PROCEDURE — 99213 OFFICE O/P EST LOW 20 MIN: CPT | Mod: PBBFAC | Performed by: NURSE PRACTITIONER

## 2023-10-26 PROCEDURE — 99999 PR PBB SHADOW E&M-EST. PATIENT-LVL III: CPT | Mod: PBBFAC,,, | Performed by: NURSE PRACTITIONER

## 2023-10-26 PROCEDURE — 99999 PR PBB SHADOW E&M-EST. PATIENT-LVL III: ICD-10-PCS | Mod: PBBFAC,,, | Performed by: NURSE PRACTITIONER

## 2023-10-26 PROCEDURE — 99213 OFFICE O/P EST LOW 20 MIN: CPT | Mod: S$PBB,,, | Performed by: NURSE PRACTITIONER

## 2023-10-26 PROCEDURE — 99213 PR OFFICE/OUTPT VISIT, EST, LEVL III, 20-29 MIN: ICD-10-PCS | Mod: S$PBB,,, | Performed by: NURSE PRACTITIONER

## 2023-10-26 RX ORDER — ARIPIPRAZOLE 2 MG/1
2 TABLET ORAL DAILY
Qty: 30 TABLET | Refills: 11 | Status: SHIPPED | OUTPATIENT
Start: 2023-10-26 | End: 2024-10-25

## 2023-10-26 RX ORDER — FLUOXETINE 10 MG/1
10 CAPSULE ORAL DAILY
Qty: 90 CAPSULE | Refills: 1 | Status: SHIPPED | OUTPATIENT
Start: 2023-10-26

## 2023-10-26 NOTE — PROGRESS NOTES
Outpatient Psychiatry Follow-Up Visit (MD/NP)    10/26/2023    Clinical Status of Patient:  Outpatient (Ambulatory)    Chief Complaint:  Binta Yang is a 62 y.o. female who presents today for follow-up of depression.  Met with patient and brother.      Last visit was: 9/14/23. Chart and  reviewed.     Interval History and Content of Current Session:  Current Psychiatric Medications/changes  Re start Prozac 20 mg daily    Accompanied by her brother who provided most of history. Reports no observable changes since starting medication. Will titrate to 20 mg and try low dose of Abilify 2 mg to augment. Brother reports her sleep and appetite are good. Still has challenges with the hygiene.  Brother also reports possible hoarding behavior. Pt smiles when responding with brief responses to questions. She used to go to a day program until her daughter passed away.  Day program at [facility].      Psychotherapy:  Target symptoms: depression  Why chosen therapy is appropriate versus another modality: relevant to diagnosis  Outcome monitoring methods: self-report  Therapeutic intervention type: insight oriented psychotherapy  Topics discussed/themes: building skills sets for symptom management, symptom recognition  The patient's response to the intervention is accepting. The patient's progress toward treatment goals is good.   Duration of intervention: 17 minutes.    Review of Systems   PSYCHIATRIC: Pertinant items are noted in the narrative.  CONSTITUTIONAL: No weight gain or loss.   MUSCULOSKELETAL: No pain or stiffness of the joints.  NEUROLOGIC: No weakness, sensory changes, seizures, confusion, memory loss, tremor or other abnormal movements.  ENDOCRINE: No polydipsia or polyuria.  INTEGUMENTARY: No rashes or lacerations.  EYES: No exophthalmos, jaundice or blindness.  ENT: No dizziness, tinnitus or hearing loss.  RESPIRATORY: No shortness of breath.  CARDIOVASCULAR: No tachycardia or chest  pain.  GASTROINTESTINAL: No nausea, vomiting, pain, constipation or diarrhea.  GENITOURINARY: No frequency, dysuria or sexual dysfunction.  HEMATOLOGIC/LYMPHATIC: No excessive bleeding, prolonged or excessive bleeding after dental extraction/injury.  ALLERGIC/IMMUNOLOGIC: No allergic response to materials, foods or animals at this time.    Past Medical, Family and Social History: The patient's past medical, family and social history have been reviewed and updated as appropriate within the electronic medical record - see encounter notes.    Compliance: yes    Side effects: see above    Risk Parameters:  Patient reports no suicidal ideation  Patient reports no homicidal ideation  Patient reports no self-injurious behavior  Patient reports no violent behavior    Exam (detailed: at least 9 elements; comprehensive: all 15 elements)   Constitutional  Vitals:  Most recent vital signs, dated greater than 90 days prior to this appointment, were reviewed.   Vitals:    10/26/23 0906   BP: (!) 136/56   Pulse: 85   Weight: 69.9 kg (154 lb 1.6 oz)        General:  unremarkable, age appropriate     Musculoskeletal  Muscle Strength/Tone:  not examined   Gait & Station:  non-ataxic     Psychiatric  Speech:  no latency; no press   Mood & Affect:  steady  congruent and appropriate   Thought Process:  normal and logical   Associations:  intact   Thought Content:  normal, no suicidality, no homicidality, delusions, or paranoia   Insight:  limited awareness of illness   Judgement: behavior is adequate to circumstances, limited   Orientation:  grossly intact   Memory: intact for content of interview   Language: grossly intact   Attention Span & Concentration:  able to focus   Fund of Knowledge:  not tested     Assessment and Diagnosis   Status/Progress: Based on the examination today, the patient's problem(s) is/are inadequately controlled.  New problems have not been presented today.   Co-morbidities and Lack of compliance are not  complicating management of the primary condition.  There are no active rule-out diagnoses for this patient at this time.     General Impression:       ICD-10-CM ICD-9-CM   1. Major depressive disorder, recurrent, moderate  F33.1 296.32   2. Grief  F43.21 309.0   3. Intellectual disability  F79 [id number]       Intervention/Counseling/Treatment Plan   Medication Management: The risks and benefits of medication were discussed with the patient.  Increase to Prozac 30 mg (20 + 10 mg capsules)  Abilify 2 mg daily  Consider starting a day program. Gave info on [facility].     Return to Clinic: 6 weeks    Risks, benefits, side effects and alternative treatments discussed with patient. Patient agrees with the current plan as documented.  Encouraged Patient to keep future appointments.  Take medications as prescribed and abstain from substance abuse.  Pt to present to ED for thoughts to harm herself or others

## 2023-10-26 NOTE — PATIENT INSTRUCTIONS
Increase to Prozac 30 mg (20 + 10 mg capsules)  Abilify 2 mg daily  Consider starting a day program

## 2023-11-06 ENCOUNTER — TELEPHONE (OUTPATIENT)
Dept: ADMINISTRATIVE | Facility: OTHER | Age: 63
End: 2023-11-06
Payer: MEDICARE

## 2023-11-06 ENCOUNTER — OFFICE VISIT (OUTPATIENT)
Dept: INTERNAL MEDICINE | Facility: CLINIC | Age: 63
End: 2023-11-06
Payer: MEDICARE

## 2023-11-06 ENCOUNTER — PATIENT MESSAGE (OUTPATIENT)
Dept: ADMINISTRATIVE | Facility: OTHER | Age: 63
End: 2023-11-06
Payer: MEDICARE

## 2023-11-06 ENCOUNTER — LAB VISIT (OUTPATIENT)
Dept: LAB | Facility: OTHER | Age: 63
End: 2023-11-06
Attending: STUDENT IN AN ORGANIZED HEALTH CARE EDUCATION/TRAINING PROGRAM
Payer: MEDICARE

## 2023-11-06 VITALS
WEIGHT: 149.94 LBS | SYSTOLIC BLOOD PRESSURE: 126 MMHG | BODY MASS INDEX: 28.33 KG/M2 | DIASTOLIC BLOOD PRESSURE: 80 MMHG | HEART RATE: 82 BPM | OXYGEN SATURATION: 98 %

## 2023-11-06 DIAGNOSIS — E53.8 VITAMIN B12 DEFICIENCY: ICD-10-CM

## 2023-11-06 DIAGNOSIS — F79 INTELLECTUAL DISABILITY: ICD-10-CM

## 2023-11-06 DIAGNOSIS — Z00.00 HEALTH MAINTENANCE EXAMINATION: ICD-10-CM

## 2023-11-06 DIAGNOSIS — F33.0 MILD EPISODE OF RECURRENT MAJOR DEPRESSIVE DISORDER: ICD-10-CM

## 2023-11-06 DIAGNOSIS — I10 ESSENTIAL HYPERTENSION: ICD-10-CM

## 2023-11-06 DIAGNOSIS — D64.9 NORMOCYTIC ANEMIA: ICD-10-CM

## 2023-11-06 DIAGNOSIS — E11.9 TYPE 2 DIABETES MELLITUS WITHOUT COMPLICATION, WITHOUT LONG-TERM CURRENT USE OF INSULIN: ICD-10-CM

## 2023-11-06 DIAGNOSIS — Z23 NEED FOR VACCINATION: ICD-10-CM

## 2023-11-06 DIAGNOSIS — E78.2 MIXED HYPERLIPIDEMIA: ICD-10-CM

## 2023-11-06 DIAGNOSIS — R56.9 SEIZURE: ICD-10-CM

## 2023-11-06 DIAGNOSIS — E55.9 VITAMIN D DEFICIENCY: ICD-10-CM

## 2023-11-06 DIAGNOSIS — Z00.00 HEALTH MAINTENANCE EXAMINATION: Primary | ICD-10-CM

## 2023-11-06 DIAGNOSIS — Z12.31 SCREENING MAMMOGRAM FOR BREAST CANCER: ICD-10-CM

## 2023-11-06 LAB
25(OH)D3+25(OH)D2 SERPL-MCNC: 11 NG/ML (ref 30–96)
ALBUMIN SERPL BCP-MCNC: 3.7 G/DL (ref 3.5–5.2)
ALP SERPL-CCNC: 107 U/L (ref 55–135)
ALT SERPL W/O P-5'-P-CCNC: 15 U/L (ref 10–44)
ANION GAP SERPL CALC-SCNC: 11 MMOL/L (ref 8–16)
AST SERPL-CCNC: 18 U/L (ref 10–40)
BASOPHILS # BLD AUTO: 0.04 K/UL (ref 0–0.2)
BASOPHILS NFR BLD: 0.7 % (ref 0–1.9)
BILIRUB SERPL-MCNC: 0.8 MG/DL (ref 0.1–1)
BUN SERPL-MCNC: 13 MG/DL (ref 8–23)
CALCIUM SERPL-MCNC: 9.4 MG/DL (ref 8.7–10.5)
CHLORIDE SERPL-SCNC: 105 MMOL/L (ref 95–110)
CHOLEST SERPL-MCNC: 143 MG/DL (ref 120–199)
CHOLEST/HDLC SERPL: 2.6 {RATIO} (ref 2–5)
CO2 SERPL-SCNC: 23 MMOL/L (ref 23–29)
CREAT SERPL-MCNC: 0.9 MG/DL (ref 0.5–1.4)
DIFFERENTIAL METHOD: ABNORMAL
EOSINOPHIL # BLD AUTO: 0.1 K/UL (ref 0–0.5)
EOSINOPHIL NFR BLD: 2.1 % (ref 0–8)
ERYTHROCYTE [DISTWIDTH] IN BLOOD BY AUTOMATED COUNT: 14.3 % (ref 11.5–14.5)
EST. GFR  (NO RACE VARIABLE): >60 ML/MIN/1.73 M^2
ESTIMATED AVG GLUCOSE: 154 MG/DL (ref 68–131)
FERRITIN SERPL-MCNC: 82 NG/ML (ref 20–300)
FOLATE SERPL-MCNC: 7.6 NG/ML (ref 4–24)
GLUCOSE SERPL-MCNC: 154 MG/DL (ref 70–110)
HBA1C MFR BLD: 7 % (ref 4–5.6)
HCT VFR BLD AUTO: 38.3 % (ref 37–48.5)
HDLC SERPL-MCNC: 54 MG/DL (ref 40–75)
HDLC SERPL: 37.8 % (ref 20–50)
HGB BLD-MCNC: 12 G/DL (ref 12–16)
IMM GRANULOCYTES # BLD AUTO: 0.01 K/UL (ref 0–0.04)
IMM GRANULOCYTES NFR BLD AUTO: 0.2 % (ref 0–0.5)
IRON SERPL-MCNC: 63 UG/DL (ref 30–160)
LDLC SERPL CALC-MCNC: 76.8 MG/DL (ref 63–159)
LYMPHOCYTES # BLD AUTO: 1.4 K/UL (ref 1–4.8)
LYMPHOCYTES NFR BLD: 26.4 % (ref 18–48)
MCH RBC QN AUTO: 25.5 PG (ref 27–31)
MCHC RBC AUTO-ENTMCNC: 31.3 G/DL (ref 32–36)
MCV RBC AUTO: 81 FL (ref 82–98)
MONOCYTES # BLD AUTO: 0.5 K/UL (ref 0.3–1)
MONOCYTES NFR BLD: 8.6 % (ref 4–15)
NEUTROPHILS # BLD AUTO: 3.3 K/UL (ref 1.8–7.7)
NEUTROPHILS NFR BLD: 62 % (ref 38–73)
NONHDLC SERPL-MCNC: 89 MG/DL
NRBC BLD-RTO: 0 /100 WBC
PATH REV BLD -IMP: NORMAL
PLATELET # BLD AUTO: 263 K/UL (ref 150–450)
PMV BLD AUTO: 9.7 FL (ref 9.2–12.9)
POTASSIUM SERPL-SCNC: 3.4 MMOL/L (ref 3.5–5.1)
PROT SERPL-MCNC: 8.1 G/DL (ref 6–8.4)
RBC # BLD AUTO: 4.71 M/UL (ref 4–5.4)
SATURATED IRON: 21 % (ref 20–50)
SODIUM SERPL-SCNC: 139 MMOL/L (ref 136–145)
TOTAL IRON BINDING CAPACITY: 296 UG/DL (ref 250–450)
TRANSFERRIN SERPL-MCNC: 200 MG/DL (ref 200–375)
TRIGL SERPL-MCNC: 61 MG/DL (ref 30–150)
TSH SERPL DL<=0.005 MIU/L-ACNC: 0.58 UIU/ML (ref 0.4–4)
VIT B12 SERPL-MCNC: 433 PG/ML (ref 210–950)
WBC # BLD AUTO: 5.34 K/UL (ref 3.9–12.7)

## 2023-11-06 PROCEDURE — 99999PBSHW FLU VACCINE (QUAD) GREATER THAN OR EQUAL TO 3YO PRESERVATIVE FREE IM: ICD-10-PCS | Mod: PBBFAC,,,

## 2023-11-06 PROCEDURE — 99214 PR OFFICE/OUTPT VISIT, EST, LEVL IV, 30-39 MIN: ICD-10-PCS | Mod: S$PBB,,, | Performed by: STUDENT IN AN ORGANIZED HEALTH CARE EDUCATION/TRAINING PROGRAM

## 2023-11-06 PROCEDURE — 85025 COMPLETE CBC W/AUTO DIFF WBC: CPT | Performed by: STUDENT IN AN ORGANIZED HEALTH CARE EDUCATION/TRAINING PROGRAM

## 2023-11-06 PROCEDURE — 80053 COMPREHEN METABOLIC PANEL: CPT | Performed by: STUDENT IN AN ORGANIZED HEALTH CARE EDUCATION/TRAINING PROGRAM

## 2023-11-06 PROCEDURE — 84443 ASSAY THYROID STIM HORMONE: CPT | Performed by: STUDENT IN AN ORGANIZED HEALTH CARE EDUCATION/TRAINING PROGRAM

## 2023-11-06 PROCEDURE — 80061 LIPID PANEL: CPT | Performed by: STUDENT IN AN ORGANIZED HEALTH CARE EDUCATION/TRAINING PROGRAM

## 2023-11-06 PROCEDURE — 83036 HEMOGLOBIN GLYCOSYLATED A1C: CPT | Performed by: STUDENT IN AN ORGANIZED HEALTH CARE EDUCATION/TRAINING PROGRAM

## 2023-11-06 PROCEDURE — 84466 ASSAY OF TRANSFERRIN: CPT | Performed by: STUDENT IN AN ORGANIZED HEALTH CARE EDUCATION/TRAINING PROGRAM

## 2023-11-06 PROCEDURE — 99999 PR PBB SHADOW E&M-EST. PATIENT-LVL IV: ICD-10-PCS | Mod: PBBFAC,,, | Performed by: STUDENT IN AN ORGANIZED HEALTH CARE EDUCATION/TRAINING PROGRAM

## 2023-11-06 PROCEDURE — 36415 COLL VENOUS BLD VENIPUNCTURE: CPT | Performed by: STUDENT IN AN ORGANIZED HEALTH CARE EDUCATION/TRAINING PROGRAM

## 2023-11-06 PROCEDURE — G0008 ADMIN INFLUENZA VIRUS VAC: HCPCS | Mod: PBBFAC

## 2023-11-06 PROCEDURE — 99999 PR PBB SHADOW E&M-EST. PATIENT-LVL IV: CPT | Mod: PBBFAC,,, | Performed by: STUDENT IN AN ORGANIZED HEALTH CARE EDUCATION/TRAINING PROGRAM

## 2023-11-06 PROCEDURE — 82306 VITAMIN D 25 HYDROXY: CPT | Performed by: STUDENT IN AN ORGANIZED HEALTH CARE EDUCATION/TRAINING PROGRAM

## 2023-11-06 PROCEDURE — 82607 VITAMIN B-12: CPT | Performed by: STUDENT IN AN ORGANIZED HEALTH CARE EDUCATION/TRAINING PROGRAM

## 2023-11-06 PROCEDURE — 99214 OFFICE O/P EST MOD 30 MIN: CPT | Mod: PBBFAC,25 | Performed by: STUDENT IN AN ORGANIZED HEALTH CARE EDUCATION/TRAINING PROGRAM

## 2023-11-06 PROCEDURE — 99214 OFFICE O/P EST MOD 30 MIN: CPT | Mod: S$PBB,,, | Performed by: STUDENT IN AN ORGANIZED HEALTH CARE EDUCATION/TRAINING PROGRAM

## 2023-11-06 PROCEDURE — 85060 PATHOLOGIST REVIEW: ICD-10-PCS | Mod: ,,, | Performed by: STUDENT IN AN ORGANIZED HEALTH CARE EDUCATION/TRAINING PROGRAM

## 2023-11-06 PROCEDURE — 99999PBSHW FLU VACCINE (QUAD) GREATER THAN OR EQUAL TO 3YO PRESERVATIVE FREE IM: Mod: PBBFAC,,,

## 2023-11-06 PROCEDURE — 85060 BLOOD SMEAR INTERPRETATION: CPT | Mod: ,,, | Performed by: STUDENT IN AN ORGANIZED HEALTH CARE EDUCATION/TRAINING PROGRAM

## 2023-11-06 PROCEDURE — 82728 ASSAY OF FERRITIN: CPT | Performed by: STUDENT IN AN ORGANIZED HEALTH CARE EDUCATION/TRAINING PROGRAM

## 2023-11-06 PROCEDURE — 82746 ASSAY OF FOLIC ACID SERUM: CPT | Performed by: STUDENT IN AN ORGANIZED HEALTH CARE EDUCATION/TRAINING PROGRAM

## 2023-11-06 NOTE — PROGRESS NOTES
"Patient was given vaccine information sheet for the Flu Vaccine. The area of injection was palpated using the acromion process as a landmark. This area was cleaned with alcohol. Using a 25g 1" safety needle, 0.5mL of the vaccine was placed into the right deltoid muscle. The injection site was dressed with a bandage. Patient experienced no complications and was discharged in stable condition. Fluarix vaccine Lot: [id number] Exp: 06/30/24     "

## 2023-11-06 NOTE — TELEPHONE ENCOUNTER
DELTA with scheduled Neurology appointment of 11-15-23, and contact number provided for any questions. My Chart message to be sent.

## 2023-11-06 NOTE — PROGRESS NOTES
Subjective:       Patient ID: Binta Yang is a 62 y.o. female.    Chief Complaint: Health maintenance examination [Z00.00]    Patient is new to me, here to establish care.    Presents with brother who provides history    Health maintenance -   Colonoscopy performed JULY2019. Told to repeat in 5 years.  Denies family history of colorectal cancer.  Mammogram BI-RADS 1 in SEP2022. Due for repeat.  Denies history of abnormal mammogram.  Denies family history of breast cancer.  Denies family history of ovarian cancer.  Hysterectomy performed after childbirth.  Denies family history of osteoporosis.  Family history of cardiac disease.  UTD on Tdap, COVID primary/booster, shingles vaccinations.  Due for influenza, COVID, PCV20, RSV vaccinations.  Never smoker.  Denies current alcohol use.   Denies drug use.  Completed HIV and hepatitis C screening.         Plans to restart [facility], excited to see friends again  Had stopped temporarily after daughter passed  Cared for by brother and sister in law    T2DM -   Currently taking Jardiance  UTD on foot exam, last completed NOV2022.  UTD on eye exam, last completed JAN2023.  Lab Results       Component                Value               Date                       HGBA1C                   7.2 (H)             09/26/2023                 HGBA1C                   7.7 (H)             08/26/2022                 HGBA1C                   7.5 (H)             05/19/2022                 HGBA1C                   7.5 (H)             05/19/2022            Lab Results       Component                Value               Date                       MICALBCREAT              7.5                 08/26/2022              HLD -   Endorses taking atorvastatin as directed  Denies side effects or concerns while taking medication  Lab Results       Component                Value               Date                       CHOL                     221 (H)             05/19/2022            Lab  Results       Component                Value               Date                       TRIG                     95                  05/19/2022            Lab Results       Component                Value               Date                       LDLCALC                  142.0               05/19/2022            Lab Results       Component                Value               Date                       HDL                      60                  05/19/2022            Due for lipid recheck.    HTN -   Currently prescribed amlodipine.  Patient endorses taking medication as directed.  Denies side effects or concerns while taking medication.  Endorses BP well controlled at home.  Due for micro albumin creatinine ratio.   Lab Results       Component                Value               Date                       MICALBCREAT              7.5                 08/26/2022            BP Readings from Last 5 Encounters:  10/26/23 : (!) 136/56  09/14/23 : (!) 142/69  11/28/22 : (!) 142/63  11/09/22 : 118/68  08/26/22 : 120/64    Following with Dr. Pearce routinely for psychiatry.  Taking fluoxetine and Abilify with good effect    Taking Keppra for seizure with good effect  No seizures since >2 years ago   Previously following with Dr. Goetz for neurology.    Anemia -  Vitamin B12 deficiency -   Lab Results       Component                Value               Date                       HGB                      11.9 (L)            05/19/2022                 HCT                      37.1                05/19/2022                 MCV                      83                  05/19/2022                 RDW                      13.9                05/19/2022            Lab Results       Component                Value               Date                       IRON                     75                  04/06/2021                 TRANSFERRIN              214                 04/06/2021                 TIBC                     317                  04/06/2021                 FESATURATED              24                  04/06/2021             Lab Results       Component                Value               Date                       FERRITIN                 76                  04/06/2021            Lab Results       Component                Value               Date                       [id number]               505                 05/19/2022            Lab Results       Component                Value               Date                       FOLATE                   8.7                 12/21/2018              Vitamin D deficiency -  Not currently taking vitamin D supplementation.             Review of Systems   Constitutional:  Negative for activity change, fatigue and fever.   Respiratory:  Negative for cough and shortness of breath.    Cardiovascular:  Negative for chest pain and palpitations.   Gastrointestinal:  Negative for abdominal pain, constipation, diarrhea, nausea and vomiting.   Neurological:  Negative for syncope and headaches.         Current Outpatient Medications   Medication Instructions    amLODIPine (NORVASC) 10 mg, Oral, Daily    ARIPiprazole (ABILIFY) 2 mg, Oral, Daily    atorvastatin (LIPITOR) 40 mg, Oral, Daily    empagliflozin (JARDIANCE) 25 mg, Oral, Daily    fexofenadine (ALLEGRA) 180 mg, Oral, Daily    flash glucose scanning reader (KROGNISTYLE ALEJANDRO 14 DAY READER) Misc Check blood glucose before meals and nightly.    flash glucose sensor (FREESTYLE ALEJANDRO 14 DAY SENSOR) Kit Apply to skin for 14 days.  Check blood glucose before meals and nightly.    FLUoxetine 20 mg, Oral, Daily    FLUoxetine 10 mg, Oral, Daily, Take with Prozac 20 mg to equal 30 mg daily    levetiracetam XR (KEPPRA XR) 750 mg, Oral, Nightly     Objective:      Vitals:    11/06/23 1004   BP: 126/80   Pulse: 82   SpO2: 98%   Weight: 68 kg (149 lb 14.6 oz)   PainSc: 0-No pain     Body mass index is 28.33 kg/m².    Physical Exam  Vitals reviewed.   Constitutional:        General: She is not in acute distress.     Appearance: Normal appearance. She is not ill-appearing or diaphoretic.   HENT:      Head: Normocephalic and atraumatic.      Right Ear: Tympanic membrane, ear canal and external ear normal. There is no impacted cerumen.      Left Ear: Tympanic membrane, ear canal and external ear normal. There is no impacted cerumen.      Nose: Nose normal. No rhinorrhea.      Mouth/Throat:      Mouth: Mucous membranes are moist.      Pharynx: Oropharynx is clear. No oropharyngeal exudate or posterior oropharyngeal erythema.   Eyes:      General: No scleral icterus.        Right eye: No discharge.         Left eye: No discharge.      Conjunctiva/sclera: Conjunctivae normal.   Neck:      Thyroid: No thyromegaly or thyroid tenderness.      Trachea: Trachea normal.   Cardiovascular:      Rate and Rhythm: Normal rate and regular rhythm.      Heart sounds: Normal heart sounds. No murmur heard.     No friction rub. No gallop.   Pulmonary:      Effort: Pulmonary effort is normal. No respiratory distress.      Breath sounds: Normal breath sounds. No stridor. No wheezing, rhonchi or rales.   Abdominal:      General: There is no distension.      Palpations: Abdomen is soft.      Tenderness: There is no abdominal tenderness. There is no guarding or rebound.   Musculoskeletal:         General: No swelling or deformity.      Cervical back: Neck supple.   Lymphadenopathy:      Head:      Right side of head: No submandibular or posterior auricular adenopathy.      Left side of head: No submandibular or posterior auricular adenopathy.      Cervical: No cervical adenopathy.      Right cervical: No superficial, deep or posterior cervical adenopathy.     Left cervical: No superficial, deep or posterior cervical adenopathy.      Upper Body:      Right upper body: No supraclavicular adenopathy.      Left upper body: No supraclavicular adenopathy.   Skin:     General: Skin is warm and dry.   Neurological:       General: No focal deficit present.      Mental Status: She is alert. Mental status is at baseline.      Gait: Gait normal.   Psychiatric:         Attention and Perception: Attention normal.         Mood and Affect: Mood normal.         Behavior: Behavior is cooperative.         Cognition and Memory: Memory is impaired.         Assessment:       1. Health maintenance examination    2. Intellectual disability    3. Type 2 diabetes mellitus without complication, without long-term current use of insulin    4. Mixed hyperlipidemia    5. Essential hypertension    6. Mild episode of recurrent major depressive disorder    7. Seizure    8. Vitamin B12 deficiency    9. Vitamin D deficiency    10. Normocytic anemia    11. Need for vaccination    12. Screening mammogram for breast cancer        Plan:       Intellectual disability  Completed 90L form today    Type 2 diabetes mellitus without complication, without long-term current use of insulin  Increase Jardiance to 25 mg   RTC in 6 months for follow up.  -     empagliflozin (JARDIANCE) 25 mg tablet; Take 1 tablet (25 mg total) by mouth once daily.  -     Ambulatory referral/consult to Optometry; Future  -     Hemoglobin A1C; Future    Mixed hyperlipidemia  Continue current medications.  RTC in 6 months for follow up.  -     Lipid Panel; Future    Essential hypertension  Continue current medications.  RTC in 6 months for follow up.  -     Comprehensive Metabolic Panel; Future  -     TSH; Future  -     Microalbumin/Creatinine Ratio, Urine; Future  -     Pathologist Interpretation Differential; Future    Mild episode of recurrent major depressive disorder  Continue medication, evaluation, and management per psychiatrist.    Seizure  Continue current medications.  RTC in 6 months for follow up.  -     Ambulatory referral/consult to Neurology; Future    Vitamin B12 deficiency  -     Vitamin B12; Future  -     Folate; Future    Vitamin D deficiency  -     Vitamin D;  Future    Normocytic anemia  -     CBC Auto Differential; Future  -     Pathologist Interpretation Differential; Future  -     Vitamin B12; Future  -     Folate; Future  -     Iron and TIBC; Future  -     Ferritin; Future    Health maintenance examination  Reviewed and discussed age appropriate screenings and immunizations.  -     Comprehensive Metabolic Panel; Future  -     TSH; Future  -     Lipid Panel; Future  -     Hemoglobin A1C; Future  -     CBC Auto Differential; Future  -     Vitamin D; Future  -     Microalbumin/Creatinine Ratio, Urine; Future  -     Pathologist Interpretation Differential; Future  -     Vitamin B12; Future  -     Folate; Future  -     Iron and TIBC; Future  -     Ferritin; Future    Need for vaccination  -     Influenza - Quadrivalent *Preferred* (6 months+) (PF)    Screening mammogram for breast cancer  -     Mammo Digital Screening Bilat w/ Arnold; Future      Rose Li MD  11/6/2023

## 2023-11-07 LAB — PATH REV BLD -IMP: NORMAL

## 2023-11-09 ENCOUNTER — TELEPHONE (OUTPATIENT)
Dept: ADMINISTRATIVE | Facility: OTHER | Age: 63
End: 2023-11-09
Payer: MEDICARE

## 2023-11-09 NOTE — TELEPHONE ENCOUNTER
DELTA with scheduled Neurology appointment of 11-15-23, and contact number provided for any questions.

## 2023-11-30 ENCOUNTER — TELEPHONE (OUTPATIENT)
Dept: ADMINISTRATIVE | Facility: OTHER | Age: 63
End: 2023-11-30
Payer: MEDICARE

## 2023-11-30 NOTE — TELEPHONE ENCOUNTER
Spoke to Melinda Yang (Relative) who declined scheduling with Dr.Mary Patel. Advised that a message will be sent to Neurology staff to review/schedule.

## 2023-12-03 DIAGNOSIS — Z71.89 COMPLEX CARE COORDINATION: ICD-10-CM

## 2023-12-13 ENCOUNTER — OFFICE VISIT (OUTPATIENT)
Dept: INTERNAL MEDICINE | Facility: CLINIC | Age: 63
End: 2023-12-13
Payer: MEDICARE

## 2023-12-13 VITALS
BODY MASS INDEX: 29.22 KG/M2 | WEIGHT: 154.75 LBS | HEIGHT: 61 IN | DIASTOLIC BLOOD PRESSURE: 80 MMHG | OXYGEN SATURATION: 98 % | SYSTOLIC BLOOD PRESSURE: 126 MMHG | HEART RATE: 82 BPM

## 2023-12-13 DIAGNOSIS — K80.80 BILIARY CALCULUS OF OTHER SITE WITHOUT OBSTRUCTION: ICD-10-CM

## 2023-12-13 DIAGNOSIS — F43.21 GRIEF: ICD-10-CM

## 2023-12-13 DIAGNOSIS — E53.8 CYANOCOBALAMIN DEFICIENCY: ICD-10-CM

## 2023-12-13 DIAGNOSIS — R53.83 FATIGUE, UNSPECIFIED TYPE: ICD-10-CM

## 2023-12-13 DIAGNOSIS — E11.9 TYPE 2 DIABETES MELLITUS WITHOUT COMPLICATION, WITHOUT LONG-TERM CURRENT USE OF INSULIN: ICD-10-CM

## 2023-12-13 DIAGNOSIS — R19.5 LOOSE STOOLS: ICD-10-CM

## 2023-12-13 DIAGNOSIS — E66.3 OVERWEIGHT (BMI 25.0-29.9): ICD-10-CM

## 2023-12-13 DIAGNOSIS — D64.9 NORMOCYTIC ANEMIA: ICD-10-CM

## 2023-12-13 DIAGNOSIS — I10 ESSENTIAL HYPERTENSION: ICD-10-CM

## 2023-12-13 DIAGNOSIS — F79 INTELLECTUAL DISABILITY: ICD-10-CM

## 2023-12-13 DIAGNOSIS — J30.89 SEASONAL ALLERGIC RHINITIS DUE TO OTHER ALLERGIC TRIGGER: ICD-10-CM

## 2023-12-13 DIAGNOSIS — H91.90 HEARING LOSS, UNSPECIFIED HEARING LOSS TYPE, UNSPECIFIED LATERALITY: ICD-10-CM

## 2023-12-13 DIAGNOSIS — R56.9 SEIZURE: ICD-10-CM

## 2023-12-13 DIAGNOSIS — K64.8 INTERNAL HEMORRHOIDS: ICD-10-CM

## 2023-12-13 DIAGNOSIS — E11.36 TYPE 2 DIABETES MELLITUS WITH CATARACT: ICD-10-CM

## 2023-12-13 DIAGNOSIS — F33.0 MILD EPISODE OF RECURRENT MAJOR DEPRESSIVE DISORDER: ICD-10-CM

## 2023-12-13 DIAGNOSIS — Z00.00 ENCOUNTER FOR PREVENTIVE HEALTH EXAMINATION: Primary | ICD-10-CM

## 2023-12-13 DIAGNOSIS — R39.81 FUNCTIONAL INCONTINENCE: ICD-10-CM

## 2023-12-13 DIAGNOSIS — L30.4 INTERTRIGO: ICD-10-CM

## 2023-12-13 DIAGNOSIS — F33.1 MAJOR DEPRESSIVE DISORDER, RECURRENT, MODERATE: ICD-10-CM

## 2023-12-13 DIAGNOSIS — I70.0 AORTIC ATHEROSCLEROSIS: ICD-10-CM

## 2023-12-13 DIAGNOSIS — K63.5 POLYP OF COLON, UNSPECIFIED PART OF COLON, UNSPECIFIED TYPE: ICD-10-CM

## 2023-12-13 PROCEDURE — G0439 PR MEDICARE ANNUAL WELLNESS SUBSEQUENT VISIT: ICD-10-PCS | Mod: ,,, | Performed by: NURSE PRACTITIONER

## 2023-12-13 PROCEDURE — G0439 PPPS, SUBSEQ VISIT: HCPCS | Mod: ,,, | Performed by: NURSE PRACTITIONER

## 2023-12-13 PROCEDURE — 99999 PR PBB SHADOW E&M-EST. PATIENT-LVL V: ICD-10-PCS | Mod: PBBFAC,,, | Performed by: NURSE PRACTITIONER

## 2023-12-13 PROCEDURE — 99999 PR PBB SHADOW E&M-EST. PATIENT-LVL V: CPT | Mod: PBBFAC,,, | Performed by: NURSE PRACTITIONER

## 2023-12-13 PROCEDURE — 99215 OFFICE O/P EST HI 40 MIN: CPT | Mod: PBBFAC | Performed by: NURSE PRACTITIONER

## 2023-12-13 NOTE — PATIENT INSTRUCTIONS
Counseling and Referral of Other Preventative  (Italic type indicates deductible and co-insurance are waived)    Patient Name: Binta Yang  Today's Date: 12/13/2023    Health Maintenance       Date Due Completion Date    RSV Vaccine (Age 60+ and Pregnant patients) (1 - 1-dose 60+ series) Never done ---    Diabetes Urine Screening 08/26/2023 8/26/2022    COVID-19 Vaccine (5 - 2023-24 season) 09/01/2023 5/5/2023    Mammogram 09/01/2023 9/1/2022    Foot Exam 11/28/2023 11/28/2022    Eye Exam 01/30/2024 1/30/2023    Hemoglobin A1c 05/06/2024 11/6/2023    Colorectal Cancer Screening 07/31/2024 7/31/2019    High Dose Statin 11/06/2024 11/6/2023    Lipid Panel 11/06/2024 11/6/2023    TETANUS VACCINE 02/11/2029 2/11/2019        No orders of the defined types were placed in this encounter.    The following information is provided to all patients.  This information is to help you find resources for any of the problems found today that may be affecting your health:                Living healthy guide: www.[facility].[place].gov      Understanding Diabetes: www.diabetes.org      Eating healthy: www.cdc.gov/healthyweight      CDC home safety checklist: www.cdc.gov/steadi/patient.html      Agency on Aging: www.goea.[place].[facility]      Alcoholics anonymous (AA): www.aa.org      Physical Activity: www.huma.nih.gov/td8yhyf      Tobacco use: www.quitwithusla.org

## 2023-12-13 NOTE — PROGRESS NOTES
"  Binta Yang presented for a  Medicare AWV and comprehensive Health Risk Assessment today. The following components were reviewed and updated:    Medical history  Family History  Social history  Allergies and Current Medications  Health Risk Assessment  Health Maintenance  Care Team         ** See Completed Assessments for Annual Wellness Visit within the encounter summary.**         The following assessments were completed:  Living Situation  CAGE  Depression Screening  Timed Get Up and Go  Whisper Test  Cognitive Function Screening  Nutrition Screening  ADL Screening  PAQ Screening        Vitals:    12/13/23 1258   BP: 126/80   BP Location: Right arm   Patient Position: Sitting   Pulse: 82   SpO2: 98%   Weight: 70.2 kg (154 lb 12.2 oz)   Height: 5' 1" (1.549 m)     Body mass index is 29.24 kg/m².    Physical Exam  Vitals reviewed.   Constitutional:       Appearance: She is well-developed.   HENT:      Head: Normocephalic and atraumatic. Not macrocephalic and not microcephalic. No raccoon eyes, Gomez's sign, abrasion, contusion, right periorbital erythema, left periorbital erythema or laceration. Hair is normal.      Right Ear: No decreased hearing noted. No laceration, drainage, swelling or tenderness. No middle ear effusion. No foreign body. No mastoid tenderness. No hemotympanum. Tympanic membrane is not injected, scarred, perforated, erythematous, retracted or bulging. Tympanic membrane has normal mobility.      Left Ear: No decreased hearing noted. No laceration, drainage, swelling or tenderness.  No middle ear effusion. No foreign body. No mastoid tenderness. No hemotympanum. Tympanic membrane is not injected, scarred, perforated, erythematous, retracted or bulging. Tympanic membrane has normal mobility.      Nose: Nose normal. No nasal deformity, laceration or mucosal edema.      Mouth/Throat:      Pharynx: Uvula midline.   Eyes:      General: Lids are normal.      Conjunctiva/sclera: Conjunctivae " normal.   Neck:      Thyroid: No thyroid mass or thyromegaly.      Trachea: Trachea normal.   Cardiovascular:      Rate and Rhythm: Normal rate and regular rhythm.   Pulmonary:      Effort: Pulmonary effort is normal.      Breath sounds: Normal breath sounds.   Abdominal:      Palpations: Abdomen is soft.   Musculoskeletal:         General: Normal range of motion.      Cervical back: Neck supple. No edema or erythema. No spinous process tenderness or muscular tenderness. Normal range of motion.   Lymphadenopathy:      Head:      Right side of head: No submental, submandibular, tonsillar, preauricular or posterior auricular adenopathy.      Left side of head: No submental, submandibular, tonsillar, preauricular, posterior auricular or occipital adenopathy.   Skin:     General: Skin is warm and dry.   Neurological:      Mental Status: She is alert and oriented to person, place, and time.      Cranial Nerves: No cranial nerve deficit.      Sensory: No sensory deficit.   Psychiatric:         Behavior: Behavior normal.         Thought Content: Thought content normal.         Judgment: Judgment normal.             Diagnoses and health risks identified today and associated recommendations/orders:    1. Encounter for preventive health examination  Annual Health Risk Assessment (HRA) visit today.  Counseling and referral of health maintenance and preventative health measures performed.  Patient given annual wellness paperwork to take home.  Encouraged to return in 1 year for subsequent HRA visit.     2. Type 2 diabetes mellitus with cataract  Chronic. Stable. Uncontrolled. Last Hgb A1c=7.0 from 11/6/23. Continue current treatment plan as previously prescribed by PCP.    3. Aortic atherosclerosis  Chronic. Stable. Continue current treatment plan as previously prescribed by PCP.    4. Essential hypertension  Chronic. Stable. Controlled. Encouraged to increase exercise as tolerated (moderate-intensity aerobic activity and  muscle-strengthening activities) improve diet to heart healthy, low sodium diet.  Continue current treatment plan as previously prescribed by PCP.    5. Major depressive disorder, recurrent, moderate  Chronic. Stable. Continue current treatment plan as previously prescribed by PCP.    6. Mild episode of recurrent major depressive disorder  Chronic. Stable. Continue current treatment plan as previously prescribed by PCP.    7. Grief  Chronic. Stable. Continue current treatment plan as previously prescribed by PCP.    8. Seasonal allergic rhinitis due to other allergic trigger  Chronic. Stable. Continue current treatment plan as previously prescribed by PCP.    9. Intertrigo  Chronic. Stable. Continue current treatment plan as previously prescribed by PCP.    10. Functional incontinence  Chronic. Stable. Continue current treatment plan as previously prescribed by PCP.    11. Normocytic anemia  Chronic. Stable. Continue current treatment plan as previously prescribed by PCP.    12. Cyanocobalamin deficiency  Chronic. Stable. Continue current treatment plan as previously prescribed by PCP.    13. Overweight (BMI 25.0-29.9)  Chronic. Stable. Continue current treatment plan as previously prescribed by PCP.    14. Type 2 diabetes mellitus without complication, without long-term current use of insulin  Chronic. Stable. Continue current treatment plan as previously prescribed by PCP.    15. Biliary calculus of other site without obstruction  Chronic. Stable. Continue current treatment plan as previously prescribed by PCP.    16. Polyp of colon, unspecified part of colon, unspecified type  Chronic. Stable. Continue current treatment plan as previously prescribed by PCP.    17. Internal hemorrhoids  Chronic. Stable. Continue current treatment plan as previously prescribed by PCP.    18. Loose stools  Chronic. Stable. Continue current treatment plan as previously prescribed by PCP.    19. Fatigue, unspecified type  Chronic.  Stable. Continue current treatment plan as previously prescribed by PCP.    20. Seizure  Chronic. Stable. Continue current treatment plan as previously prescribed by PCP.    21. Intellectual disability  Chronic. Stable. Continue current treatment plan as previously prescribed by PCP.        Provided Herdine with a 5-10 year written screening schedule and personal prevention plan. Recommendations were developed using the USPSTF age appropriate recommendations. Education, counseling, and referrals were provided as needed. After Visit Summary printed and given to patient which includes a list of additional screenings\tests needed.      I offered to discuss end of life issues, including information on how to make advance directives that the patient could use to name someone who would make medical decisions on their behalf if they became too ill to make themselves.    ___Patient declined  _X_Patient is interested, I provided paper work and offered to discuss.    Follow up in about 1 year (around 12/13/2024).    CINTHIA Vaughan offered to discuss advanced care planning, including how to pick a person who would make decisions for you if you were unable to make them for yourself, called a health care power of , and what kind of decisions you might make such as use of life sustaining treatments such as ventilators and tube feeding when faced with a life limiting illness recorded on a living will that they will need to know. (How you want to be cared for as you near the end of your natural life)     X Patient is interested in learning more about how to make advanced directives.  I provided them paperwork and offered to discuss this with them.

## 2023-12-26 ENCOUNTER — HOSPITAL ENCOUNTER (OUTPATIENT)
Dept: RADIOLOGY | Facility: OTHER | Age: 63
Discharge: HOME | End: 2023-12-26
Attending: STUDENT IN AN ORGANIZED HEALTH CARE EDUCATION/TRAINING PROGRAM
Payer: MEDICARE

## 2023-12-26 DIAGNOSIS — Z12.31 SCREENING MAMMOGRAM FOR BREAST CANCER: ICD-10-CM

## 2023-12-26 PROCEDURE — 77067 SCR MAMMO BI INCL CAD: CPT | Mod: TC

## 2023-12-26 PROCEDURE — 77063 MAMMO DIGITAL SCREENING BILAT WITH TOMO: ICD-10-PCS | Mod: 26,,, | Performed by: RADIOLOGY

## 2023-12-26 PROCEDURE — 77067 MAMMO DIGITAL SCREENING BILAT WITH TOMO: ICD-10-PCS | Mod: 26,,, | Performed by: RADIOLOGY

## 2023-12-26 PROCEDURE — 77067 SCR MAMMO BI INCL CAD: CPT | Mod: 26,,, | Performed by: RADIOLOGY

## 2023-12-26 PROCEDURE — 77063 BREAST TOMOSYNTHESIS BI: CPT | Mod: 26,,, | Performed by: RADIOLOGY

## 2024-01-02 ENCOUNTER — OFFICE VISIT (OUTPATIENT)
Dept: PODIATRY | Facility: CLINIC | Age: 64
End: 2024-01-02
Payer: MEDICARE

## 2024-01-02 VITALS
HEIGHT: 61 IN | BODY MASS INDEX: 29.07 KG/M2 | SYSTOLIC BLOOD PRESSURE: 147 MMHG | WEIGHT: 154 LBS | DIASTOLIC BLOOD PRESSURE: 77 MMHG | HEART RATE: 81 BPM

## 2024-01-02 DIAGNOSIS — E11.36 TYPE 2 DIABETES MELLITUS WITH CATARACT: ICD-10-CM

## 2024-01-02 DIAGNOSIS — E11.9 ENCOUNTER FOR DIABETIC FOOT EXAM: Primary | ICD-10-CM

## 2024-01-02 DIAGNOSIS — E11.9 TYPE 2 DIABETES MELLITUS WITHOUT COMPLICATION, WITHOUT LONG-TERM CURRENT USE OF INSULIN: ICD-10-CM

## 2024-01-02 PROCEDURE — 99213 OFFICE O/P EST LOW 20 MIN: CPT | Mod: S$PBB,,, | Performed by: PODIATRIST

## 2024-01-02 PROCEDURE — 99214 OFFICE O/P EST MOD 30 MIN: CPT | Mod: PBBFAC,PN | Performed by: PODIATRIST

## 2024-01-02 PROCEDURE — 99999 PR PBB SHADOW E&M-EST. PATIENT-LVL IV: CPT | Mod: PBBFAC,,, | Performed by: PODIATRIST

## 2024-01-02 NOTE — PROGRESS NOTES
Subjective:      Patient ID: Binta Yang is a 63 y.o. female.    Chief Complaint: Diabetic Foot Exam (Foot Exam/PCP Rose Li MD  12/13/23)    Diabetes, increased risk amputation needing evaluation/management/optomization of foot care.    No current concerns.    Athletic lace up shoes.       Chief Complaint   Patient presents with    Diabetic Foot Exam     Foot Exam/PCP Rose Li MD  12/13/23          Patient Active Problem List   Diagnosis    Depression    Normocytic anemia    Functional incontinence    Essential hypertension    Cyanocobalamin deficiency    Type 2 diabetes mellitus without complication, without long-term current use of insulin    Seasonal allergic rhinitis    Intertrigo    Colon polyp    Cholelithiasis    Internal hemorrhoids    Aortic atherosclerosis    Fatigue    Loose stools    Intellectual disability    Mild episode of recurrent major depressive disorder    Seizure    Type 2 diabetes mellitus with cataract    Overweight (BMI 25.0-29.9)    Major depressive disorder, recurrent, moderate    Grief         Current Outpatient Medications on File Prior to Visit   Medication Sig Dispense Refill    amLODIPine (NORVASC) 10 MG tablet Take 1 tablet (10 mg total) by mouth once daily. 90 tablet 0    ARIPiprazole (ABILIFY) 2 MG Tab Take 1 tablet (2 mg total) by mouth once daily. 30 tablet 11    empagliflozin (JARDIANCE) 25 mg tablet Take 1 tablet (25 mg total) by mouth once daily. 90 tablet 1    fexofenadine (ALLEGRA) 180 MG tablet Take 1 tablet (180 mg total) by mouth once daily. 30 tablet 11    flash glucose scanning reader (FREESTYLE ALEJANDRO 14 DAY READER) Misc Check blood glucose before meals and nightly. 1 each 0    flash glucose sensor (FREESTYLE ALEJANDRO 14 DAY SENSOR) Kit Apply to skin for 14 days.  Check blood glucose before meals and nightly. 1 kit 11    FLUoxetine 10 MG capsule Take 1 capsule (10 mg total) by mouth once daily. Take with Prozac 20 mg to equal 30 mg daily 90  "capsule 1    FLUoxetine 20 MG capsule Take 1 capsule (20 mg total) by mouth once daily. 90 capsule 1    atorvastatin (LIPITOR) 40 MG tablet Take 1 tablet (40 mg total) by mouth once daily. 90 tablet 0    levetiracetam XR (KEPPRA XR) 750 mg Tb24 tablet Take 1 tablet (750 mg total) by mouth every evening. 90 tablet 0     No current facility-administered medications on file prior to visit.         Review of patient's allergies indicates:  No Known Allergies            Objective:        Vitals:    01/02/24 1437   BP: (!) 147/77   Pulse: 81   Weight: 69.9 kg (154 lb)   Height: 5' 1" (1.549 m)       Physical Exam  Constitutional:       General: She is not in acute distress.     Appearance: She is well-developed. She is not diaphoretic.     Cardiovascular:      Pulses:           Popliteal pulses are 2+ on the right side and 2+ on the left side.        Dorsalis pedis pulses are 2+ on the right side and 2+ on the left side.        Posterior tibial pulses are 2+ on the right side and 2+ on the left side.      Comments: Capillary refill 3 seconds all toes/distal feet, all toes/both feet warm to touch.      Negative lymphadenopathy bilateral popliteal fossa and tarsal tunnel.      Negavie lower extremity edema bilateral.    Musculoskeletal:      Right ankle: No swelling, deformity, ecchymosis or lacerations. Normal range of motion. Normal pulse.      Right Achilles Tendon: Normal. No defects. Ornelas's test negative.      Comments: Patient has hammertoes of digit 2 left                   partially reducible without symptom today.     Otherwise, Normal angle, base, station of gait. All ten toes without clubbing, cyanosis, or signs of ischemia.  No pain to palpation bilateral lower extremities.  Range of motion, stability, muscle strength, and muscle tone normal bilateral feet and legs.        Lymphadenopathy:      Lower Body: No right inguinal adenopathy. No left inguinal adenopathy.      Comments: Negative lymphadenopathy " bilateral popliteal fossa and tarsal tunnel.    Negative lymphangitic streaking bilateral feet/ankles/legs.   Skin:     General: Skin is warm and dry.      Capillary Refill: Capillary refill takes 2 to 3 seconds.      Coloration: Skin is not pale.      Findings: No abrasion, bruising, burn, ecchymosis, erythema, laceration, lesion or rash.      Nails: There is no clubbing.      Comments:   Skin is normal age and health appropriate color, turgor, texture, and temperature bilateral lower extremities without ulceration, hyperpigmentation, discoloration, masses nodules or cords palpated.  No ecchymosis, erythema, edema, or cardinal signs of infection bilateral lower extremities.       Neurological:      Mental Status: She is alert and oriented to person, place, and time.      Sensory: No sensory deficit.      Motor: No tremor, atrophy or abnormal muscle tone.      Gait: Gait normal.      Deep Tendon Reflexes:      Reflex Scores:       Patellar reflexes are 2+ on the right side and 2+ on the left side.       Achilles reflexes are 2+ on the right side and 2+ on the left side.     Comments: Negative tinel sign to percussion sural, superficial peroneal, deep peroneal, saphenous, and posterior tibial nerves right and left ankles and feet.            Assessment:       Encounter Diagnoses   Name Primary?    Type 2 diabetes mellitus with cataract     Encounter for diabetic foot exam Yes    Type 2 diabetes mellitus without complication, without long-term current use of insulin          Plan:       Binta was seen today for diabetic foot exam.    Diagnoses and all orders for this visit:    Encounter for diabetic foot exam    Type 2 diabetes mellitus with cataract  -     Ambulatory referral/consult to Podiatry    Type 2 diabetes mellitus without complication, without long-term current use of insulin      I counseled the patient on her conditions, their implications and medical management.  Shoe inspection.     Continue good  nutrition and blood sugar control to help prevent podiatric complications of diabetes.   Maintain proper foot hygiene.   Continue wearing proper shoe gear, daily foot inspections, never walking without protective shoe gear, never putting sharp instruments to feet.    Annual diabetes foot exam.        I spent a total of 20 minutes on the day of the visit.  This includes face to face time and non-face to face time preparing to see the patient (eg, review of tests), obtaining and/or reviewing separately obtained history, documenting clinical information in the electronic or other health record, independently interpreting results and communicating results to the patient/family/caregiver, or care coordinator.

## 2024-01-02 NOTE — PATIENT INSTRUCTIONS
How to Check Your Feet    Below are tips to help you look for foot problems. Try to check your feet at the same time each day, such as when you get out of bed in the morning.    Check the top of each foot. The tops of toes, back of the heel, and outer edge of the foot can get a lot of rubbing from poor-fitting shoes.    Check the bottom of each foot. Daily wear and tear often leads to problems at pressure spots.    Check the toes and nails. Fungal infections often occur between toes. Toenail problems can also be a sign of fungal infections or lead to breaks in the skin.    Check your shoes, too. Loose objects inside a shoe can injure the foot. Use your hand to feel inside your shoes for things like joey, loose stitching, or rough areas that could irritate your skin.        Diabetic Foot Care    Diabetes can lead to a number of different foot complications. Fortunately, most of these complications can be prevented with a little extra foot care. If diabetes is not well controlled, the high blood sugar can cause damage to blood vessels and result in poor circulation to the foot. When the skin does not get enough blood flow, it becomes prone to pressure sores and ulcers, which heal slowly.  High blood sugar can also damage nerves, interfering with the ability to feel pain and pressure. When you cant feel your foot normally, it is easy to injure your skin, bones and joints without knowing it. For these reasons diabetes increases the risk of fungal infections, bunions and ulcers. Deep ulcers can lead to bone infection. Gangrene is the most serious foot complication of diabetes. It usually occurs on the tips of the toes as blacked areas of skin. The black area is dead tissue. In severe cases, gangrene spreads to involve the entire toe, other toes and the entire foot. Foot or toe amputation may be required. Good foot care and blood sugar control can prevent this.    Home Care  Wear comfortable, proper fitting  shoes.  Wash your feet daily with warm water and mild soap.  After drying, apply a moisturizing cream or lotion.  Check your feet daily for skin breaks, blisters, swelling, or redness. Look between your toes also.  Wear cotton socks and change them every day.  Trim toe nails carefully and do not cut your cuticles.  Strive to keep your blood sugar under control with a combination of medicines, diet and activity.  If you smoke and have diabetes, it is very important that you stop. Smoking reduces blood flow to your foot.  Avoid activities that increase your risk of foot injury:  Do not walk barefoot.  Do not use heating pads or hot water bottles on your feet.  Do not put your foot in a hot tub without first checking the temperature with your hand.  10) Schedule yearly foot exams.    Follow Up  with your doctor or as advised by our staff. Report any cut, puncture, scrape, other injury, blister, ingrown toenail or ulcer on your foot.    Get Prompt Medical Attention  if any of the following occur:  -- Open ulcer with pus draining from the wound  -- Increasing foot or leg pain  -- New areas of redness or swelling or tender areas of the foot    © 0189-5164 [employer]. [address] [place] [address], [place] [address]. All rights reserved. This information is not intended as a substitute for professional medical care. Always follow your healthcare professional's instructions.

## 2024-01-05 ENCOUNTER — PATIENT MESSAGE (OUTPATIENT)
Dept: OTOLARYNGOLOGY | Facility: CLINIC | Age: 64
End: 2024-01-05
Payer: MEDICARE

## 2024-02-08 ENCOUNTER — PATIENT OUTREACH (OUTPATIENT)
Dept: ADMINISTRATIVE | Facility: HOSPITAL | Age: 64
End: 2024-02-08
Payer: MEDICARE

## 2024-02-08 NOTE — PROGRESS NOTES
Population Health Chart Review & Patient Outreach Details    Outreach Performed: YES Telephone Successful    Additional Pop Health Notes:           Updates Requested / Reviewed:      Updated Care Coordination Note, Care Everywhere, Care Team Updated, Removed  or Duplicate Orders, and Immunizations Reconciliation Completed or Queried: [place]         Health Maintenance Topics Overdue:    Health Maintenance Due   Topic Date Due    Pneumococcal Vaccines (Age 65+) (1 of 2 - PCV) Never done    High Dose Statin  Never done    DEXA Scan  Never done    RSV Vaccine (Age 60+ and Pregnant patients) (1 - 1-dose 60+ series) Never done    Diabetes Urine Screening  2023    COVID-19 Vaccine (2023- season) 2023         Health Maintenance Topic(s) Outreach Outcomes & Actions Taken:    Medication Adherence / Statins - Outreach Outcomes & Actions Taken  : message to pcp to have medication refilled.

## 2024-03-08 DIAGNOSIS — E11.9 TYPE 2 DIABETES MELLITUS WITHOUT COMPLICATION, WITHOUT LONG-TERM CURRENT USE OF INSULIN: ICD-10-CM

## 2024-03-08 NOTE — TELEPHONE ENCOUNTER
Care Due:                  Date            Visit Type   Department     Provider  --------------------------------------------------------------------------------                                EP -                              PRIMARY      [facility] INTERNAL  Last Visit: 11-      CARE ([facility])   CHRISTINA Li                               -                              PRIMARY      [facility] INTERNAL  Next Visit: 05-      CARE ([facility])   CHRISTINA Li                                                            Last  Test          Frequency    Reason                     Performed    Due Date  --------------------------------------------------------------------------------    HBA1C.......  6 months...  empagliflozin............  11- 05-    [employer] Embedded Care Due Messages. Reference number: [id number].   3/08/2024 9:54:31 AM CST

## 2024-03-26 ENCOUNTER — OFFICE VISIT (OUTPATIENT)
Dept: OPTOMETRY | Facility: CLINIC | Age: 64
End: 2024-03-26
Payer: MEDICARE

## 2024-03-26 DIAGNOSIS — H25.13 NUCLEAR SCLEROSIS OF BOTH EYES: ICD-10-CM

## 2024-03-26 DIAGNOSIS — E11.9 TYPE 2 DIABETES MELLITUS WITHOUT COMPLICATION, WITHOUT LONG-TERM CURRENT USE OF INSULIN: Primary | ICD-10-CM

## 2024-03-26 DIAGNOSIS — H40.053 BORDERLINE GLAUCOMA WITH OCULAR HYPERTENSION, BILATERAL: ICD-10-CM

## 2024-03-26 PROCEDURE — 99213 OFFICE O/P EST LOW 20 MIN: CPT | Mod: PBBFAC | Performed by: OPTOMETRIST

## 2024-03-26 PROCEDURE — 99999 PR PBB SHADOW E&M-EST. PATIENT-LVL III: CPT | Mod: PBBFAC,,, | Performed by: OPTOMETRIST

## 2024-03-26 PROCEDURE — 99214 OFFICE O/P EST MOD 30 MIN: CPT | Mod: S$PBB,,, | Performed by: OPTOMETRIST

## 2024-03-26 RX ORDER — LATANOPROST 50 UG/ML
1 SOLUTION/ DROPS OPHTHALMIC NIGHTLY
Qty: 2.5 ML | Refills: 6 | Status: SHIPPED | OUTPATIENT
Start: 2024-03-26 | End: 2024-04-25

## 2024-03-26 NOTE — PROGRESS NOTES
HPI    DLS:  1/30/23    OTC eyedrops PRN OU   No eye surgery     PFamHx:  Glaucoma-father, paternal uncle, brother (Zac)  Glaucoma suspect-brother (Henry)    Hemoglobin A1C (%)       Date                     Value                 11/06/2023               7.0 (H)               09/26/2023               7.2 (H)               08/26/2022               7.7 (H)          ----------   Pt here for diabetic eye exam. Pt brother states pt squints and does   things with the lights off.  Pt denies flashes, floaters, headaches or eye   pain OU.  Pt denies itching, tearing or burning OU.     Last edited by Elizabet Navarrete MA on 3/26/2024  9:42 AM.            Assessment /Plan     For exam results, see Encounter Report.    Type 2 diabetes mellitus without complication, without long-term current use of insulin  Comments:  A1C elevated d/t dietary indescretions. Hard to regulate diet; family would benefit from DM edu. A1C today. Jardiance d/t borderline low GFR. Addtl note below.  Orders:  -     Ambulatory referral/consult to Optometry  -No retinopathy noted today.  Continued control with primary care physician and annual comprehensive eye exam.    Borderline glaucoma with ocular hypertension, bilateral  -     latanoprost 0.005 % ophthalmic solution; Place 1 drop into both eyes every evening.  Dispense: 2.5 mL; Refill: 6  -Start Latanprost, target low 20s  -OCT and IOP 1 mo  -Unable HVF 2/2 cognitive    Nuclear sclerosis of both eyes  -Educated patient on presence of cataracts at today's exam, monitor at annual dilated fundus exam. 5+ years surgical estimate.  -Pt natural monovision, sRx not necessary      RTC 1 mo

## 2024-04-09 DIAGNOSIS — I10 ESSENTIAL HYPERTENSION: ICD-10-CM

## 2024-04-09 DIAGNOSIS — F33.1 MAJOR DEPRESSIVE DISORDER, RECURRENT, MODERATE: ICD-10-CM

## 2024-04-09 DIAGNOSIS — I70.0 AORTIC ATHEROSCLEROSIS: ICD-10-CM

## 2024-04-09 RX ORDER — FLUOXETINE 10 MG/1
10 CAPSULE ORAL DAILY
Qty: 90 CAPSULE | Refills: 1 | Status: SHIPPED | OUTPATIENT
Start: 2024-04-09

## 2024-04-09 RX ORDER — ATORVASTATIN CALCIUM 40 MG/1
40 TABLET, FILM COATED ORAL DAILY
Qty: 90 TABLET | Refills: 1 | Status: SHIPPED | OUTPATIENT
Start: 2024-04-09 | End: 2024-10-06

## 2024-04-09 RX ORDER — LEVETIRACETAM 750 MG/1
750 TABLET, EXTENDED RELEASE ORAL NIGHTLY
Qty: 90 TABLET | Refills: 1 | Status: SHIPPED | OUTPATIENT
Start: 2024-04-09 | End: 2024-10-06

## 2024-04-09 RX ORDER — AMLODIPINE BESYLATE 10 MG/1
10 TABLET ORAL DAILY
Qty: 90 TABLET | Refills: 1 | Status: SHIPPED | OUTPATIENT
Start: 2024-04-09

## 2024-04-09 NOTE — TELEPHONE ENCOUNTER
No care due was identified.  [facility] Embedded Care Due Messages. Reference number: [id number].   4/09/2024 3:14:09 PM CDT

## 2024-04-09 NOTE — TELEPHONE ENCOUNTER
----- Message from Renato Ruelas sent at 4/9/2024  9:39 AM CDT -----   Name of Who is Calling:     What is the request in detail:  request call back in reference to 90L  and refill request on all medications  (caller do not know name of  medications )  Please contact to further discuss and advise      Can the clinic reply by [facility]:     [facility]  [phone] / ciro mendoza / [id number] - [facility] [address] AT [facility] [address]

## 2024-04-11 ENCOUNTER — OFFICE VISIT (OUTPATIENT)
Dept: OPTOMETRY | Facility: CLINIC | Age: 64
End: 2024-04-11
Payer: MEDICARE

## 2024-04-11 ENCOUNTER — TELEPHONE (OUTPATIENT)
Dept: INTERNAL MEDICINE | Facility: CLINIC | Age: 64
End: 2024-04-11
Payer: MEDICARE

## 2024-04-11 DIAGNOSIS — H40.053 BORDERLINE GLAUCOMA WITH OCULAR HYPERTENSION, BILATERAL: Primary | ICD-10-CM

## 2024-04-11 DIAGNOSIS — H25.13 NUCLEAR SCLEROSIS OF BOTH EYES: ICD-10-CM

## 2024-04-11 PROCEDURE — 99213 OFFICE O/P EST LOW 20 MIN: CPT | Mod: PBBFAC,25 | Performed by: OPTOMETRIST

## 2024-04-11 PROCEDURE — 92133 CPTRZD OPH DX IMG PST SGM ON: CPT | Mod: PBBFAC | Performed by: OPTOMETRIST

## 2024-04-11 PROCEDURE — 99214 OFFICE O/P EST MOD 30 MIN: CPT | Mod: S$PBB,,, | Performed by: OPTOMETRIST

## 2024-04-11 PROCEDURE — 99999 PR PBB SHADOW E&M-EST. PATIENT-LVL III: CPT | Mod: PBBFAC,,, | Performed by: OPTOMETRIST

## 2024-04-11 NOTE — PROGRESS NOTES
HPI    Borderline glaucoma with ocular hypertension, bilateral  latanoprost  OU QAM (not aware it was evening)   Started Latanprost,   target low 20s  -OCT and IOP 1 mo  -Unable HVF 2/2 cognitive       Last edited by Ruperto Ruelas, OD on 4/11/2024  9:05 AM.            Assessment /Plan     For exam results, see Encounter Report.    Borderline glaucoma with ocular hypertension, bilateral  -     OCT, Optic Nerve - OU - Both Eyes  -Latanoprost QHS, reviewed benefit of evening dose vs AM dose  -Target low 20's poor IOP pt severe lid squeezer    Nuclear sclerosis of both eyes  -Educated patient on presence of cataracts at today's exam, monitor at annual dilated fundus exam. 1-4 years surgical estimate.      RTC 1 yr OCT, IOP, DFE

## 2024-05-06 ENCOUNTER — OFFICE VISIT (OUTPATIENT)
Dept: INTERNAL MEDICINE | Facility: CLINIC | Age: 64
End: 2024-05-06
Payer: MEDICARE

## 2024-05-06 ENCOUNTER — LAB VISIT (OUTPATIENT)
Dept: LAB | Facility: OTHER | Age: 64
End: 2024-05-06
Attending: STUDENT IN AN ORGANIZED HEALTH CARE EDUCATION/TRAINING PROGRAM
Payer: MEDICARE

## 2024-05-06 VITALS
BODY MASS INDEX: 30.78 KG/M2 | HEART RATE: 80 BPM | OXYGEN SATURATION: 100 % | SYSTOLIC BLOOD PRESSURE: 138 MMHG | DIASTOLIC BLOOD PRESSURE: 70 MMHG | WEIGHT: 162.94 LBS

## 2024-05-06 DIAGNOSIS — I10 ESSENTIAL HYPERTENSION: ICD-10-CM

## 2024-05-06 DIAGNOSIS — E78.2 MIXED HYPERLIPIDEMIA: ICD-10-CM

## 2024-05-06 DIAGNOSIS — Z78.0 ASYMPTOMATIC MENOPAUSAL STATE: ICD-10-CM

## 2024-05-06 DIAGNOSIS — Z00.00 HEALTH MAINTENANCE EXAMINATION: Primary | ICD-10-CM

## 2024-05-06 DIAGNOSIS — Z12.12 SCREENING FOR COLORECTAL CANCER: ICD-10-CM

## 2024-05-06 DIAGNOSIS — E11.65 TYPE 2 DIABETES MELLITUS WITH HYPERGLYCEMIA, WITHOUT LONG-TERM CURRENT USE OF INSULIN: ICD-10-CM

## 2024-05-06 DIAGNOSIS — Z23 NEED FOR VACCINATION: ICD-10-CM

## 2024-05-06 DIAGNOSIS — D64.9 NORMOCYTIC ANEMIA: ICD-10-CM

## 2024-05-06 DIAGNOSIS — E53.8 VITAMIN B12 DEFICIENCY: ICD-10-CM

## 2024-05-06 DIAGNOSIS — F33.1 MAJOR DEPRESSIVE DISORDER, RECURRENT, MODERATE: ICD-10-CM

## 2024-05-06 DIAGNOSIS — E55.9 VITAMIN D DEFICIENCY: ICD-10-CM

## 2024-05-06 DIAGNOSIS — Z12.11 SCREENING FOR COLORECTAL CANCER: ICD-10-CM

## 2024-05-06 DIAGNOSIS — R35.1 NOCTURIA: ICD-10-CM

## 2024-05-06 DIAGNOSIS — Z00.00 HEALTH MAINTENANCE EXAMINATION: ICD-10-CM

## 2024-05-06 LAB
ALBUMIN SERPL BCP-MCNC: 3.3 G/DL (ref 3.5–5.2)
ALP SERPL-CCNC: 104 U/L (ref 55–135)
ALT SERPL W/O P-5'-P-CCNC: 15 U/L (ref 10–44)
ANION GAP SERPL CALC-SCNC: 11 MMOL/L (ref 8–16)
AST SERPL-CCNC: 17 U/L (ref 10–40)
BACTERIA #/AREA URNS AUTO: ABNORMAL /HPF
BASOPHILS # BLD AUTO: 0.05 K/UL (ref 0–0.2)
BASOPHILS NFR BLD: 0.7 % (ref 0–1.9)
BILIRUB SERPL-MCNC: 0.7 MG/DL (ref 0.1–1)
BILIRUB UR QL STRIP: NEGATIVE
BUN SERPL-MCNC: 16 MG/DL (ref 8–23)
CALCIUM SERPL-MCNC: 9.4 MG/DL (ref 8.7–10.5)
CHLORIDE SERPL-SCNC: 108 MMOL/L (ref 95–110)
CLARITY UR REFRACT.AUTO: ABNORMAL
CO2 SERPL-SCNC: 22 MMOL/L (ref 23–29)
COLOR UR AUTO: YELLOW
CREAT SERPL-MCNC: 1 MG/DL (ref 0.5–1.4)
DIFFERENTIAL METHOD BLD: ABNORMAL
EOSINOPHIL # BLD AUTO: 0.2 K/UL (ref 0–0.5)
EOSINOPHIL NFR BLD: 2.2 % (ref 0–8)
ERYTHROCYTE [DISTWIDTH] IN BLOOD BY AUTOMATED COUNT: 14.1 % (ref 11.5–14.5)
EST. GFR  (NO RACE VARIABLE): >60 ML/MIN/1.73 M^2
ESTIMATED AVG GLUCOSE: 148 MG/DL (ref 68–131)
GLUCOSE SERPL-MCNC: 172 MG/DL (ref 70–110)
GLUCOSE UR QL STRIP: NEGATIVE
HBA1C MFR BLD: 6.8 % (ref 4–5.6)
HCT VFR BLD AUTO: 37.2 % (ref 37–48.5)
HGB BLD-MCNC: 11.6 G/DL (ref 12–16)
HGB UR QL STRIP: ABNORMAL
HYALINE CASTS UR QL AUTO: 0 /LPF
IMM GRANULOCYTES # BLD AUTO: 0.02 K/UL (ref 0–0.04)
IMM GRANULOCYTES NFR BLD AUTO: 0.3 % (ref 0–0.5)
KETONES UR QL STRIP: NEGATIVE
LEUKOCYTE ESTERASE UR QL STRIP: ABNORMAL
LYMPHOCYTES # BLD AUTO: 1.3 K/UL (ref 1–4.8)
LYMPHOCYTES NFR BLD: 19.3 % (ref 18–48)
MCH RBC QN AUTO: 25.8 PG (ref 27–31)
MCHC RBC AUTO-ENTMCNC: 31.2 G/DL (ref 32–36)
MCV RBC AUTO: 83 FL (ref 82–98)
MICROSCOPIC COMMENT: ABNORMAL
MONOCYTES # BLD AUTO: 0.5 K/UL (ref 0.3–1)
MONOCYTES NFR BLD: 7.5 % (ref 4–15)
NEUTROPHILS # BLD AUTO: 4.7 K/UL (ref 1.8–7.7)
NEUTROPHILS NFR BLD: 70 % (ref 38–73)
NITRITE UR QL STRIP: NEGATIVE
NRBC BLD-RTO: 0 /100 WBC
PH UR STRIP: 5 [PH] (ref 5–8)
PLATELET # BLD AUTO: 275 K/UL (ref 150–450)
PMV BLD AUTO: 9.7 FL (ref 9.2–12.9)
POTASSIUM SERPL-SCNC: 3.9 MMOL/L (ref 3.5–5.1)
PROT SERPL-MCNC: 7.6 G/DL (ref 6–8.4)
PROT UR QL STRIP: ABNORMAL
RBC # BLD AUTO: 4.49 M/UL (ref 4–5.4)
RBC #/AREA URNS AUTO: 75 /HPF (ref 0–4)
SODIUM SERPL-SCNC: 141 MMOL/L (ref 136–145)
SP GR UR STRIP: 1.01 (ref 1–1.03)
SQUAMOUS #/AREA URNS AUTO: 1 /HPF
URN SPEC COLLECT METH UR: ABNORMAL
WBC # BLD AUTO: 6.67 K/UL (ref 3.9–12.7)
WBC #/AREA URNS AUTO: >100 /HPF (ref 0–5)

## 2024-05-06 PROCEDURE — 87186 SC STD MICRODIL/AGAR DIL: CPT | Performed by: STUDENT IN AN ORGANIZED HEALTH CARE EDUCATION/TRAINING PROGRAM

## 2024-05-06 PROCEDURE — 87088 URINE BACTERIA CULTURE: CPT | Performed by: STUDENT IN AN ORGANIZED HEALTH CARE EDUCATION/TRAINING PROGRAM

## 2024-05-06 PROCEDURE — G2211 COMPLEX E/M VISIT ADD ON: HCPCS | Mod: S$PBB,,, | Performed by: STUDENT IN AN ORGANIZED HEALTH CARE EDUCATION/TRAINING PROGRAM

## 2024-05-06 PROCEDURE — 81001 URINALYSIS AUTO W/SCOPE: CPT | Performed by: STUDENT IN AN ORGANIZED HEALTH CARE EDUCATION/TRAINING PROGRAM

## 2024-05-06 PROCEDURE — 36415 COLL VENOUS BLD VENIPUNCTURE: CPT | Performed by: STUDENT IN AN ORGANIZED HEALTH CARE EDUCATION/TRAINING PROGRAM

## 2024-05-06 PROCEDURE — 87086 URINE CULTURE/COLONY COUNT: CPT | Performed by: STUDENT IN AN ORGANIZED HEALTH CARE EDUCATION/TRAINING PROGRAM

## 2024-05-06 PROCEDURE — 83036 HEMOGLOBIN GLYCOSYLATED A1C: CPT | Performed by: STUDENT IN AN ORGANIZED HEALTH CARE EDUCATION/TRAINING PROGRAM

## 2024-05-06 PROCEDURE — G0009 ADMIN PNEUMOCOCCAL VACCINE: HCPCS | Mod: PBBFAC

## 2024-05-06 PROCEDURE — 99999PBSHW PR PBB SHADOW TECHNICAL ONLY FILED TO HB: Mod: PBBFAC,,,

## 2024-05-06 PROCEDURE — 99214 OFFICE O/P EST MOD 30 MIN: CPT | Mod: S$PBB,,, | Performed by: STUDENT IN AN ORGANIZED HEALTH CARE EDUCATION/TRAINING PROGRAM

## 2024-05-06 PROCEDURE — 80053 COMPREHEN METABOLIC PANEL: CPT | Performed by: STUDENT IN AN ORGANIZED HEALTH CARE EDUCATION/TRAINING PROGRAM

## 2024-05-06 PROCEDURE — 99214 OFFICE O/P EST MOD 30 MIN: CPT | Mod: PBBFAC,25 | Performed by: STUDENT IN AN ORGANIZED HEALTH CARE EDUCATION/TRAINING PROGRAM

## 2024-05-06 PROCEDURE — 90677 PCV20 VACCINE IM: CPT | Mod: PBBFAC

## 2024-05-06 PROCEDURE — 99999 PR PBB SHADOW E&M-EST. PATIENT-LVL IV: CPT | Mod: PBBFAC,,, | Performed by: STUDENT IN AN ORGANIZED HEALTH CARE EDUCATION/TRAINING PROGRAM

## 2024-05-06 PROCEDURE — 85025 COMPLETE CBC W/AUTO DIFF WBC: CPT | Performed by: STUDENT IN AN ORGANIZED HEALTH CARE EDUCATION/TRAINING PROGRAM

## 2024-05-06 PROCEDURE — 87077 CULTURE AEROBIC IDENTIFY: CPT | Performed by: STUDENT IN AN ORGANIZED HEALTH CARE EDUCATION/TRAINING PROGRAM

## 2024-05-06 RX ORDER — CEFDINIR 300 MG/1
300 CAPSULE ORAL 2 TIMES DAILY
Qty: 10 CAPSULE | Refills: 0 | Status: SHIPPED | OUTPATIENT
Start: 2024-05-06 | End: 2024-05-11

## 2024-05-06 RX ADMIN — PNEUMOCOCCAL 20-VALENT CONJUGATE VACCINE 0.5 ML
2.2; 2.2; 2.2; 2.2; 2.2; 2.2; 2.2; 2.2; 2.2; 2.2; 2.2; 2.2; 2.2; 2.2; 2.2; 2.2; 4.4; 2.2; 2.2; 2.2 INJECTION, SUSPENSION INTRAMUSCULAR at 11:05

## 2024-05-06 NOTE — PROGRESS NOTES
TWO PATIENT IDENTIFIERS VERIFIED.  ALLERGIES VERIFIED.     After obtaining verbal consent from the patient, and per orders of Dr. SANCHEZ, injection of  PREVNAR 20 PNEUMOCOCCAL VACCINE Lot [id number] 2025 AUG given in the RIGHT DELTOID by KENTON POWER LPN. Patient tolerated well and band aid applied. Patient instructed to remain in clinic for 15 minutes afterwards, and to report any adverse reaction to me immediately.

## 2024-05-06 NOTE — PROGRESS NOTES
Subjective:       Patient ID: Binta Yang is a 63 y.o. female.    Chief Complaint: Health maintenance examination [Z00.00]    Patient is established with me, here today for the following:    T2DM, HTN, HLD, intellectual disability, seizures, depression, vitamin D deficiency, vitamin B12 deficiency      Presents with brother who assists with patient's care     Health maintenance -   Colonoscopy performed JULY2019. Told to repeat in 5 years.  Denies family history of colorectal cancer.  Mammogram BI-RADS 1 in DEC2023.   Denies history of abnormal mammogram.  Denies family history of breast cancer.  Denies family history of ovarian cancer.  Hysterectomy performed after childbirth.  Due for DEXA scan, never previously started screening.  Denies family history of osteoporosis.  Family history of cardiac disease.  UTD on Tdap, COVID primary/booster, shingles vaccinations.  Due for COVID, PCV20, RSV vaccinations.  Never smoker.  Denies current alcohol use.   Denies drug use.  Completed HIV and hepatitis C screening.          Per brother, patient has been experiencing urinary incontinence at night over the past few days to weeks  Patient denies pain with urination or burning  Feels she's drinking more water  Drinks water within 2-3 hours before going to sleep     T2DM -   Currently taking Jardiance  UTD on foot exam, last completed JAN2024.  Due for eye exam.   Lab Results       Component                Value               Date                       HGBA1C                   7.0 (H)             11/06/2023                 HGBA1C                   7.2 (H)             09/26/2023                 HGBA1C                   7.7 (H)             08/26/2022            Lab Results       Component                Value               Date                       MICALBCREAT              7.5                 08/26/2022               HLD -   Endorses taking atorvastatin as directed  Denies side effects or concerns while taking  medication  Lab Results       Component                Value               Date                       CHOL                     143                 11/06/2023            Lab Results       Component                Value               Date                       TRIG                     61                  11/06/2023            Lab Results       Component                Value               Date                       LDLCALC                  76.8                11/06/2023            Lab Results       Component                Value               Date                       HDL                      54                  11/06/2023               HTN -   Currently prescribed amlodipine.  Patient endorses taking medication as directed.  Denies side effects or concerns while taking medication.  Due for micro albumin creatinine ratio.   Lab Results       Component                Value               Date                       MICALBCREAT              7.5                 08/26/2022            BP Readings from Last 5 Encounters:  01/02/24 : (!) 147/77  12/13/23 : 126/80  11/06/23 : 126/80  10/26/23 : (!) 136/56  09/14/23 : (!) 142/69     Following with Dr. Pearce routinely for psychiatry.  Taking fluoxetine and Abilify with good effect      Anemia -  Vitamin B12 deficiency -   Lab Results       Component                Value               Date                       HGB                      12.0                11/06/2023                 HCT                      38.3                11/06/2023                 MCV                      81 (L)              11/06/2023                 RDW                      14.3                11/06/2023            Lab Results       Component                Value               Date                       IRON                     63                  11/06/2023                 TRANSFERRIN              200                 11/06/2023                 TIBC                     296                 11/06/2023                  FESATURATED              21                  11/06/2023             Lab Results       Component                Value               Date                       FERRITIN                 82                  11/06/2023            Lab Results       Component                Value               Date                       [id number]               433                 11/06/2023            Lab Results       Component                Value               Date                       FOLATE                   7.6                 11/06/2023                     Vitamin D deficiency -  Currently taking vitamin D3 supplementation daily.   Lab Results       Component                Value               Date                       [id number]             11 (L)              11/06/2023                  Review of Systems   Constitutional:  Negative for activity change, fatigue and fever.   Respiratory:  Negative for cough and shortness of breath.    Cardiovascular:  Negative for chest pain and palpitations.   Genitourinary:  Positive for frequency. Negative for dysuria and pelvic pain.   Neurological:  Negative for syncope and headaches.         Current Outpatient Medications   Medication Instructions    amLODIPine (NORVASC) 10 mg, Oral, Daily    ARIPiprazole (ABILIFY) 2 mg, Oral, Daily    atorvastatin (LIPITOR) 40 mg, Oral, Daily    empagliflozin (JARDIANCE) 25 mg, Oral, Daily    fexofenadine (ALLEGRA) 180 mg, Oral, Daily    flash glucose scanning reader ([employer] ALEJANDRO 14 DAY READER) Misc Check blood glucose before meals and nightly.    flash glucose sensor (FREESTYLE ALEJANDRO 14 DAY SENSOR) Kit Apply to skin for 14 days.  Check blood glucose before meals and nightly.    FLUoxetine 20 mg, Oral, Daily    FLUoxetine 10 mg, Oral, Daily, Take with Prozac 20 mg to equal 30 mg daily    latanoprost 0.005 % ophthalmic solution 1 drop, Both Eyes, Nightly    levetiracetam XR (KEPPRA XR) 750 mg, Oral, Nightly     Objective:      Vitals:     05/06/24 1031   BP: 138/70   Pulse: 80   SpO2: 100%   Weight: 73.9 kg (162 lb 14.7 oz)   PainSc: 0-No pain     Body mass index is 30.78 kg/m².    Physical Exam  Vitals reviewed.   Constitutional:       General: She is not in acute distress.     Appearance: She is not ill-appearing or diaphoretic.   Cardiovascular:      Rate and Rhythm: Normal rate and regular rhythm.      Heart sounds: Normal heart sounds. No murmur heard.     No friction rub. No gallop.   Pulmonary:      Effort: Pulmonary effort is normal. No respiratory distress.      Breath sounds: Normal breath sounds. No stridor. No wheezing, rhonchi or rales.   Skin:     General: Skin is warm and dry.      Comments: Color WNL   Neurological:      Mental Status: She is alert. Mental status is at baseline.   Psychiatric:         Mood and Affect: Mood normal.         Behavior: Behavior normal.         Cognition and Memory: Cognition is impaired. Memory is impaired.         Assessment:       1. Health maintenance examination    2. Nocturia    3. Type 2 diabetes mellitus with hyperglycemia, without long-term current use of insulin    4. Mixed hyperlipidemia    5. Essential hypertension    6. Major depressive disorder, recurrent, moderate    7. Normocytic anemia    8. Vitamin B12 deficiency    9. Vitamin D deficiency    10. Need for vaccination    11. Asymptomatic menopausal state    12. Screening for colorectal cancer        Plan:       Nocturia  Check UA  Recommend restricting fluids within 2-3 hours of bedtime  RTC PRN.  -     Urinalysis, Reflex to Urine Culture Urine, Clean Catch  -     POCT URINE DIPSTICK WITHOUT MICROSCOPE    Type 2 diabetes mellitus with hyperglycemia, without long-term current use of insulin  Continue current medications.  RTC in 6 months for follow up.  -     Comprehensive Metabolic Panel; Future  -     Hemoglobin A1C; Future  -     CBC Auto Differential; Future  -     Ambulatory referral/consult to Ophthalmology; Future    Mixed  hyperlipidemia  Continue current medications.  RTC in 6 months for follow up.    Essential hypertension  Continue current medications.  RTC in 6 months for follow up.  -     Comprehensive Metabolic Panel; Future  -     CBC Auto Differential; Future  -     Cancel: Microalbumin/Creatinine Ratio, Urine; Future  -     Microalbumin/Creatinine Ratio, Urine; Future    Major depressive disorder, recurrent, moderate  Continue medication, evaluation, and management per psychiatrist.    Normocytic anemia  Vitamin B12 deficiency  RTC in 6 months for follow up.  -     CBC Auto Differential; Future    Vitamin D deficiency  Continue supplementation.  RTC in 6 months for follow up.    Health maintenance examination  Reviewed and discussed age appropriate screenings and immunizations.  -     Comprehensive Metabolic Panel; Future  -     Hemoglobin A1C; Future  -     CBC Auto Differential; Future  -     Ambulatory referral/consult to Endo Procedure ; Future  -     Cancel: Microalbumin/Creatinine Ratio, Urine; Future  -     DXA Bone Density Axial Skeleton 1 or more sites; Future  -     Microalbumin/Creatinine Ratio, Urine; Future    Need for vaccination  -     pneumoc 20-ivon conj-dip cr(PF) (PREVNAR-20 (PF)) injection Syrg 0.5 mL    Asymptomatic menopausal state  -     DXA Bone Density Axial Skeleton 1 or more sites; Future    Screening for colorectal cancer  -     Ambulatory referral/consult to Endo Procedure ; Future      Rose Li MD  5/6/2024

## 2024-05-07 ENCOUNTER — TELEPHONE (OUTPATIENT)
Dept: INTERNAL MEDICINE | Facility: CLINIC | Age: 64
End: 2024-05-07
Payer: MEDICARE

## 2024-05-07 NOTE — TELEPHONE ENCOUNTER
----- Message from Rose Li MD sent at 5/6/2024  7:40 PM CDT -----  Please let patient's brother know she has a UTI. I sent cefdinir to the pharmacy to start taking to treat the infection asap. Thank you!

## 2024-05-08 LAB — BACTERIA UR CULT: ABNORMAL

## 2024-05-23 ENCOUNTER — HOSPITAL ENCOUNTER (OUTPATIENT)
Dept: RADIOLOGY | Facility: OTHER | Age: 64
Discharge: HOME | End: 2024-05-23
Attending: STUDENT IN AN ORGANIZED HEALTH CARE EDUCATION/TRAINING PROGRAM
Payer: MEDICARE

## 2024-05-23 DIAGNOSIS — Z78.0 ASYMPTOMATIC MENOPAUSAL STATE: ICD-10-CM

## 2024-05-23 DIAGNOSIS — Z00.00 HEALTH MAINTENANCE EXAMINATION: ICD-10-CM

## 2024-05-23 PROCEDURE — 77080 DXA BONE DENSITY AXIAL: CPT | Mod: TC

## 2024-05-23 PROCEDURE — 77080 DXA BONE DENSITY AXIAL: CPT | Mod: 26,,, | Performed by: RADIOLOGY

## 2024-06-19 ENCOUNTER — TELEPHONE (OUTPATIENT)
Dept: ENDOSCOPY | Facility: HOSPITAL | Age: 64
End: 2024-06-19

## 2024-06-19 ENCOUNTER — CLINICAL SUPPORT (OUTPATIENT)
Dept: ENDOSCOPY | Facility: HOSPITAL | Age: 64
End: 2024-06-19
Attending: STUDENT IN AN ORGANIZED HEALTH CARE EDUCATION/TRAINING PROGRAM
Payer: MEDICARE

## 2024-06-19 DIAGNOSIS — Z12.11 SCREENING FOR COLORECTAL CANCER: ICD-10-CM

## 2024-06-19 DIAGNOSIS — Z86.010 HISTORY OF COLON POLYPS: Primary | ICD-10-CM

## 2024-06-19 DIAGNOSIS — Z00.00 HEALTH MAINTENANCE EXAMINATION: ICD-10-CM

## 2024-06-19 DIAGNOSIS — Z12.12 SCREENING FOR COLORECTAL CANCER: ICD-10-CM

## 2024-06-19 NOTE — TELEPHONE ENCOUNTER
Spoke to pt's sister-in-law, Melinda, to schedule procedure(s) Colonoscopy       Physician to perform procedure(s) Dr. YOVANNY Raphael  Date of Procedure (s) 9/30/24  Arrival Time 10:00 AM  Time of Procedure(s) 11:00 AM   Location of Procedure(s) [facility] [address]  Type of Rx Prep sent to patient: Miralax  Instructions provided to patient via [facility]    Patient was informed on the following information and verbalized understanding. Screening questionnaire reviewed with patient and complete. If procedure requires anesthesia, a responsible adult needs to be present to accompany the patient home, patient cannot drive after receiving anesthesia. Appointment details are tentative, especially check-in time. Patient will receive a prep-op call 7 days prior to confirm check-in time for procedure. If applicable the patient should contact their pharmacy to verify Rx for procedure prep is ready for pick-up. Patient was advised to call the scheduling department at [phone] if pharmacy states no Rx is available. Patient was advised to call the endoscopy scheduling department if any questions or concerns arise.       Endoscopy Scheduling Department

## 2024-07-03 DIAGNOSIS — Z71.89 COMPLEX CARE COORDINATION: ICD-10-CM

## 2024-09-23 ENCOUNTER — TELEPHONE (OUTPATIENT)
Dept: ENDOSCOPY | Facility: HOSPITAL | Age: 64
End: 2024-09-23
Payer: MEDICARE

## 2024-09-23 NOTE — TELEPHONE ENCOUNTER
Spoke with care giver. She was not able to complete pre call for colonoscopy on 9/30/24. She stated needed to speak with  but he was in a meeting.

## 2024-10-18 NOTE — Clinical Note
Patient blood pressure 184/79 , HR 83.  After 20min  170/78, HR 80.  Patient asymptomatic
Patient blood pressure 184/80, HR 83,  After 20min 
warm and dry/color normal/normal/no rashes/no ulcers

## 2024-11-14 ENCOUNTER — OFFICE VISIT (OUTPATIENT)
Dept: INTERNAL MEDICINE | Facility: CLINIC | Age: 64
End: 2024-11-14
Payer: MEDICARE

## 2024-11-14 VITALS
HEART RATE: 83 BPM | SYSTOLIC BLOOD PRESSURE: 142 MMHG | OXYGEN SATURATION: 100 % | DIASTOLIC BLOOD PRESSURE: 78 MMHG | HEIGHT: 61 IN | WEIGHT: 181 LBS | BODY MASS INDEX: 34.17 KG/M2

## 2024-11-14 DIAGNOSIS — F33.1 MAJOR DEPRESSIVE DISORDER, RECURRENT, MODERATE: ICD-10-CM

## 2024-11-14 DIAGNOSIS — I70.0 AORTIC ATHEROSCLEROSIS: ICD-10-CM

## 2024-11-14 DIAGNOSIS — E78.2 MIXED HYPERLIPIDEMIA: ICD-10-CM

## 2024-11-14 DIAGNOSIS — Z00.00 HEALTH MAINTENANCE EXAMINATION: Primary | ICD-10-CM

## 2024-11-14 DIAGNOSIS — Z12.11 SCREENING FOR COLORECTAL CANCER: ICD-10-CM

## 2024-11-14 DIAGNOSIS — E53.8 VITAMIN B12 DEFICIENCY: ICD-10-CM

## 2024-11-14 DIAGNOSIS — Z12.12 SCREENING FOR COLORECTAL CANCER: ICD-10-CM

## 2024-11-14 DIAGNOSIS — M85.851 OSTEOPENIA OF BOTH HIPS: ICD-10-CM

## 2024-11-14 DIAGNOSIS — D64.9 NORMOCYTIC ANEMIA: ICD-10-CM

## 2024-11-14 DIAGNOSIS — M85.852 OSTEOPENIA OF BOTH HIPS: ICD-10-CM

## 2024-11-14 DIAGNOSIS — Z23 NEED FOR VACCINATION: ICD-10-CM

## 2024-11-14 DIAGNOSIS — E11.9 TYPE 2 DIABETES MELLITUS WITHOUT COMPLICATION, WITHOUT LONG-TERM CURRENT USE OF INSULIN: ICD-10-CM

## 2024-11-14 DIAGNOSIS — E55.9 VITAMIN D DEFICIENCY: ICD-10-CM

## 2024-11-14 DIAGNOSIS — I10 ESSENTIAL HYPERTENSION: ICD-10-CM

## 2024-11-14 DIAGNOSIS — R35.1 NOCTURIA: ICD-10-CM

## 2024-11-14 DIAGNOSIS — Z12.31 SCREENING MAMMOGRAM FOR BREAST CANCER: ICD-10-CM

## 2024-11-14 PROCEDURE — 99215 OFFICE O/P EST HI 40 MIN: CPT | Mod: PBBFAC | Performed by: STUDENT IN AN ORGANIZED HEALTH CARE EDUCATION/TRAINING PROGRAM

## 2024-11-14 PROCEDURE — 90480 ADMN SARSCOV2 VAC 1/ONLY CMP: CPT | Mod: PBBFAC

## 2024-11-14 PROCEDURE — 90653 IIV ADJUVANT VACCINE IM: CPT | Mod: PBBFAC

## 2024-11-14 PROCEDURE — 99999PBSHW PR PBB SHADOW TECHNICAL ONLY FILED TO HB: Mod: PBBFAC,,,

## 2024-11-14 PROCEDURE — G0008 ADMIN INFLUENZA VIRUS VAC: HCPCS | Mod: PBBFAC

## 2024-11-14 PROCEDURE — 99999 PR PBB SHADOW E&M-EST. PATIENT-LVL V: CPT | Mod: PBBFAC,,, | Performed by: STUDENT IN AN ORGANIZED HEALTH CARE EDUCATION/TRAINING PROGRAM

## 2024-11-14 PROCEDURE — 91320 SARSCV2 VAC 30MCG TRS-SUC IM: CPT | Mod: PBBFAC

## 2024-11-14 RX ORDER — FLUOXETINE 10 MG/1
10 CAPSULE ORAL DAILY
Qty: 90 CAPSULE | Refills: 3 | Status: SHIPPED | OUTPATIENT
Start: 2024-11-14 | End: 2024-11-14

## 2024-11-14 RX ORDER — FLUOXETINE 10 MG/1
10 CAPSULE ORAL DAILY
Qty: 90 CAPSULE | Refills: 3 | Status: SHIPPED | OUTPATIENT
Start: 2024-11-14

## 2024-11-14 RX ORDER — ATORVASTATIN CALCIUM 40 MG/1
40 TABLET, FILM COATED ORAL DAILY
Qty: 90 TABLET | Refills: 3 | Status: SHIPPED | OUTPATIENT
Start: 2024-11-14

## 2024-11-14 RX ADMIN — INFLUENZA A VIRUS A/VICTORIA/4897/2022 IVR-238 (H1N1) ANTIGEN (FORMALDEHYDE INACTIVATED), INFLUENZA A VIRUS A/THAILAND/8/2022 IVR-237 (H3N2) ANTIGEN (FORMALDEHYDE INACTIVATED), INFLUENZA B VIRUS B/AUSTRIA/1359417/2021 BVR-26 ANTIGEN (FORMALDEHYDE INACTIVATED) 0.5 ML: 15; 15; 15 INJECTION, SUSPENSION INTRAMUSCULAR at 11:11

## 2024-11-14 RX ADMIN — COVID-19 VACCINE, MRNA 0.3 ML: 0.04 INJECTION, SUSPENSION INTRAMUSCULAR at 11:11

## 2024-11-14 NOTE — PATIENT INSTRUCTIONS
Your vitamin D is low, I recommend starting a daily vitamin D3 (cholecalciferol) 3,000 IU supplement. This can be obtained without a prescription at most pharmacies or grocery stores. We can recheck your vitamin D at a later time.     Discussed dietary fiber sources and directions for supplementing as desired.

## 2024-11-14 NOTE — PROGRESS NOTES
Subjective:       Patient ID: Binta Yang is a 63 y.o. female.    Chief Complaint: Health maintenance examination [Z00.00]    Patient is established with me, here today for the following:    T2DM, HTN, HLD, intellectual disability, seizures, depression, vitamin D deficiency, vitamin B12 deficiency    She is accompanied by daughter-in-law who provides supplemental history    History of Present Illness      PREVENTIVE CARE:  She is due for a colonoscopy, last performed in July 2019 with a recommended 5-year follow-up. A mammogram is scheduled for December. She is eligible for flu and COVID vaccines available at the clinic, and the RSV vaccine is recommended. An annual eye exam is due, though she reports no specific eye concerns. She is scheduled for annual labs tomorrow, including A1C, cholesterol, and CBC. A recent bone density screening revealed osteopenia.    CHRONIC DISEASE MANAGEMENT:  She continues Jardiance for diabetes management, acknowledging some compliance difficulties. She takes amlodipine in the morning for blood pressure control, occasionally forgetting the dose. She was diagnosed with osteopenia following a recent bone density screening.    MEDICATIONS:  She takes fluoxetine 20+10 mg (30 mg total) and reports issues with the prescription at [facility], where the dosage was split. VINOD Pearce continues to prescribe Abilify. She likely needs refills on all medications, particularly mentioning atorvastatin. She notes having only a few pills left of one medication but can't recall which one specifically.    URINARY INCONTINENCE:  She experiences urinary incontinence, primarily at night, resulting in skin irritation and redness in the genital area. She has attempted to reduce fluid intake 2-3 hours before bedtime and installed night lights for better bathroom access. Her family expresses reluctance to use a bedside commode, preferring to maintain current mobility levels.    GASTROINTESTINAL:  She  reports experiencing diarrhea approximately twice weekly.    FOLLOW-UP APPOINTMENTS:  A podiatry follow-up is due in January, with her last visit approximately one year ago. She expresses no specific foot-related concerns at this time.      ROS:  Eyes: -discharge  Gastrointestinal: +diarrhea, -constipation  Genitourinary: +urinary incontinence             Presents with brother who assists with patient's care     Health maintenance -   Colonoscopy performed JULY2019. Told to repeat in 5 years.   Denies family history of colorectal cancer.  Mammogram BI-RADS 1 in DEC2023. Due for repeat.  Denies history of abnormal mammogram.  Denies family history of breast cancer.  Denies family history of ovarian cancer.  Hysterectomy performed after childbirth.  Family history of cardiac disease.  UTD on Tdap, COVID primary/booster, shingles, PCV20 vaccinations.  Due for COVID, influenza, RSV vaccinations.  Never smoker.  Denies current alcohol use.   Denies drug use.  Completed HIV and hepatitis C screening.          T2DM -   Currently taking Jardiance  UTD on foot exam, last completed JAN2024.  Due for eye exam .   Lab Results   Component Value Date    HGBA1C 6.8 (H) 05/06/2024    HGBA1C 7.0 (H) 11/06/2023    HGBA1C 7.2 (H) 09/26/2023     Lab Results   Component Value Date    MICALBCREAT 7.5 08/26/2022                HLD -   Endorses taking atorvastatin as directed  Lab Results   Component Value Date    CHOL 143 11/06/2023     Lab Results   Component Value Date    TRIG 61 11/06/2023     Lab Results   Component Value Date    LDLCALC 76.8 11/06/2023     Lab Results   Component Value Date    HDL 54 11/06/2023   Due for lipid recheck.              HTN -   Currently prescribed amlodipine.  Due for micro albumin creatinine ratio.   Lab Results   Component Value Date    MICALBCREAT 7.5 08/26/2022     BP Readings from Last 5 Encounters:   11/14/24 (!) 142/78   05/06/24 138/70   01/02/24 (!) 147/77   12/13/23 126/80   11/06/23 126/80     "  Following with Dr. Pearce routinely for psychiatry.  Taking fluoxetine and Abilify       Anemia -  Vitamin B12 deficiency -   Lab Results   Component Value Date    HGB 11.6 (L) 05/06/2024    HCT 37.2 05/06/2024    MCV 83 05/06/2024    RDW 14.1 05/06/2024     Lab Results   Component Value Date    IRON 63 11/06/2023    TRANSFERRIN 200 11/06/2023    TIBC 296 11/06/2023    FESATURATED 21 11/06/2023      Lab Results   Component Value Date    FERRITIN 82 11/06/2023     Lab Results   Component Value Date    QUEHVTAJ60 433 11/06/2023     Lab Results   Component Value Date    FOLATE 7.6 11/06/2023     Osteopenia -   Vitamin D deficiency -   Completed DEXA in MAY2024.  Showed osteopenia of right and left hips.  "Ten year fracture probability:   Major osteoporotic 4.4%.   Hip 0.6%. "  Denies family history of osteoporosis.  Lab Results   Component Value Date    [id number] 11 (L) 11/06/2023         Current Outpatient Medications   Medication Instructions    amLODIPine (NORVASC) 10 mg, Oral, Daily    ARIPiprazole (ABILIFY) 2 mg, Oral, Daily    atorvastatin (LIPITOR) 40 mg, Oral, Daily    empagliflozin (JARDIANCE) 25 mg, Oral, Daily    fexofenadine (ALLEGRA) 180 mg, Oral, Daily    flash glucose scanning reader (FREESTYLE ALEJANDRO 14 DAY READER) Misc Check blood glucose before meals and nightly.    flash glucose sensor (FREESTYLE ALEJANDRO 14 DAY SENSOR) Kit Apply to skin for 14 days.  Check blood glucose before meals and nightly.    FLUoxetine 20 mg, Oral, Daily    FLUoxetine 10 mg, Oral, Daily, Take with Prozac 20 mg to equal 30 mg daily    latanoprost 0.005 % ophthalmic solution 1 drop, Both Eyes, Nightly    levetiracetam XR (KEPPRA XR) 750 mg, Oral, Nightly    zinc oxide 10 % Oint 1 Application, Topical (Top), 2 times daily     Objective:      Vitals:    11/14/24 1106   BP: (!) 142/78   Pulse: 83   SpO2: 100%   Weight: 82.1 kg (181 lb)   Height: 5' 1" (1.549 m)   PainSc: 0-No pain     Body mass index is 34.2 kg/m².    Physical " Exam  Vitals reviewed.   Constitutional:       General: She is not in acute distress.     Appearance: Normal appearance. She is not ill-appearing or diaphoretic.   HENT:      Head: Normocephalic and atraumatic.      Right Ear: Tympanic membrane, ear canal and external ear normal. There is no impacted cerumen.      Left Ear: Tympanic membrane, ear canal and external ear normal. There is no impacted cerumen.      Nose: Nose normal. No rhinorrhea.      Mouth/Throat:      Mouth: Mucous membranes are moist.      Pharynx: Oropharynx is clear. No oropharyngeal exudate or posterior oropharyngeal erythema.   Eyes:      General: No scleral icterus.        Right eye: No discharge.         Left eye: No discharge.      Conjunctiva/sclera: Conjunctivae normal.   Neck:      Thyroid: No thyromegaly or thyroid tenderness.      Trachea: Trachea normal.   Cardiovascular:      Rate and Rhythm: Normal rate and regular rhythm.      Heart sounds: Normal heart sounds. No murmur heard.     No friction rub. No gallop.   Pulmonary:      Effort: Pulmonary effort is normal. No respiratory distress.      Breath sounds: Normal breath sounds. No stridor. No wheezing, rhonchi or rales.   Abdominal:      General: There is no distension.      Palpations: Abdomen is soft.      Tenderness: There is no abdominal tenderness. There is no guarding or rebound.   Musculoskeletal:         General: No swelling or deformity.      Cervical back: Neck supple.   Lymphadenopathy:      Head:      Right side of head: No submandibular or posterior auricular adenopathy.      Left side of head: No submandibular or posterior auricular adenopathy.      Cervical: No cervical adenopathy.      Right cervical: No superficial, deep or posterior cervical adenopathy.     Left cervical: No superficial, deep or posterior cervical adenopathy.      Upper Body:      Right upper body: No supraclavicular adenopathy.      Left upper body: No supraclavicular adenopathy.   Skin:      General: Skin is warm and dry.   Neurological:      General: No focal deficit present.      Mental Status: She is alert. Mental status is at baseline.      Gait: Gait normal.   Psychiatric:         Mood and Affect: Mood normal.         Speech: Speech is delayed.         Behavior: Behavior is cooperative.         Assessment:       1. Health maintenance examination    2. Screening mammogram for breast cancer    3. Need for vaccination    4. Type 2 diabetes mellitus without complication, without long-term current use of insulin    5. Mixed hyperlipidemia    6. Vitamin B12 deficiency    7. Vitamin D deficiency    8. Osteopenia of both hips    9. Essential hypertension    10. Normocytic anemia    11. Screening for colorectal cancer    12. Major depressive disorder, recurrent, moderate    13. Nocturia    14. Aortic atherosclerosis        Plan:   Screening mammogram for breast cancer  -     Mammo Digital Screening Bilat w/ Arnold; Future; Expected date: 12/30/2024    Need for vaccination  -     influenza (adjuvanted) (Fluad) 45 mcg/0.5 mL IM vaccine (> or = 66 yo) 0.5 mL  -     COVID-19 (Pfizer) 30 mcg/0.3 mL IM vaccine (>/= 13 yo) 0.3 mL    Type 2 diabetes mellitus without complication, without long-term current use of insulin  -     Hemoglobin A1C; Future; Expected date: 11/14/2024  -     Ambulatory referral/consult to Optometry; Future; Expected date: 11/21/2024  -     Ambulatory referral/consult to Podiatry; Future; Expected date: 01/14/2025    Mixed hyperlipidemia  -     Lipid Panel; Future; Expected date: 11/14/2024    Vitamin B12 deficiency  -     Vitamin B12; Future; Expected date: 11/14/2024  -     Folate; Future; Expected date: 11/14/2024    Vitamin D deficiency  -     Vitamin D; Future; Expected date: 11/14/2024    Osteopenia of both hips    Health maintenance examination  -     Microalbumin/Creatinine Ratio, Urine; Future; Expected date: 11/14/2024  -     Comprehensive Metabolic Panel; Future; Expected date:  11/14/2024  -     TSH; Future; Expected date: 11/14/2024  -     Lipid Panel; Future; Expected date: 11/14/2024  -     Hemoglobin A1C; Future; Expected date: 11/14/2024  -     CBC Auto Differential; Future; Expected date: 11/14/2024  -     Vitamin D; Future; Expected date: 11/14/2024  -     Vitamin B12; Future; Expected date: 11/14/2024  -     Folate; Future; Expected date: 11/14/2024  -     Iron and TIBC; Future; Expected date: 11/14/2024  -     Ferritin; Future; Expected date: 11/14/2024    Essential hypertension  -     Microalbumin/Creatinine Ratio, Urine; Future; Expected date: 11/14/2024  -     Comprehensive Metabolic Panel; Future; Expected date: 11/14/2024  -     TSH; Future; Expected date: 11/14/2024    Normocytic anemia  -     CBC Auto Differential; Future; Expected date: 11/14/2024  -     Vitamin B12; Future; Expected date: 11/14/2024  -     Folate; Future; Expected date: 11/14/2024  -     Iron and TIBC; Future; Expected date: 11/14/2024  -     Ferritin; Future; Expected date: 11/14/2024    Screening for colorectal cancer  -     Ambulatory referral/consult to Endo Procedure ; Future; Expected date: 11/15/2024    Major depressive disorder, recurrent, moderate  -     Discontinue: FLUoxetine 10 MG capsule; Take 1 capsule (10 mg total) by mouth once daily. Take with Prozac 20 mg to equal 30 mg daily  Dispense: 90 capsule; Refill: 3  -     FLUoxetine 10 MG capsule; Take 1 capsule (10 mg total) by mouth once daily. Take with Prozac 20 mg to equal 30 mg daily  Dispense: 90 capsule; Refill: 3    Nocturia  -     Ambulatory referral/consult to Urogynecology; Future; Expected date: 11/21/2024  -     Discontinue: zinc oxide 10 % Oint; Apply 1 Application topically 2 (two) times a day.  Dispense: 85 g; Refill: 5  -     zinc oxide 10 % Oint; Apply 1 Application topically 2 (two) times a day.  Dispense: 85 g; Refill: 5    Aortic atherosclerosis      Assessment & Plan    A1C improved to 6.8, at goal   Blood  pressure slightly elevated not having taken medication today; continued current amlodipine regimen  Bone density screen showed osteopenia; focusing on vitamin D and calcium supplementation  Addressing nighttime urinary incontinence; considering structural causes  Recommending fiber supplementation for diarrhea management    TYPE 2 DIABETES MELLITUS:  - Continued Jardiance for diabetes management.  - Fasting labs ordered: A1C, cholesterol, blood counts, kidney function, liver function, vitamin D, and thyroid.  - Referred to podiatrist for foot exam in January.    HYPERTENSION:  - Continued amlodipine for blood pressure control, taken in the morning.  - Check BP at home 3-4 times weekly, keep log for review.   - Contact office with home blood pressure logs in 2-3 weeks.     OSTEOPENIA:  - Explained osteopenia as decrease in bone density, not yet osteoporosis.  - Discussed importance of vitamin D and calcium for bone health.  - Started daily vitamin D supplement.  - Herdine to increase outdoor activity and walking.    NOCTURNAL ENURESIS:  - Herdine to limit water intake 2-3 hours before bedtime to reduce nighttime urination.  - Explained zinc barrier cream use for protecting skin from moisture irritation.  - Started zinc barrier cream for skin protection during nighttime incontinence.  - Referred to urogynecologist for evaluation of nighttime urinary incontinence.  - Contact office if nighttime incontinence worsens or if barrier cream is ineffective.    FUNCTIONAL DIARRHEA:  - Educated on fiber supplementation options for managing diarrhea.  - Recommend using fiber supplement (e.g., psyllium husk or wheat dextrin) to manage diarrhea.    HYPERLIPIDEMIA:  - Continued atorvastatin; refill provided.    PREVENTIVE CARE AND FOLLOW-UPS:  - Flu shot administered in office.  - COVID vaccine administered in office.  - Referred to optometrist for annual eye exam.  - Follow up to schedule colonoscopy; verify 5-year follow-up  recommendation with [facility].  - Follow up to schedule mammogram for December.  - Follow up for fasting labs tomorrow morning.    OTHER:  - Continued fluoxetine 20mg; added 10mg dose to reach desired total dosage of 30mg.         38 minutes were spent in chart review, documentation and review of results, and evaluation, treatment, and counseling of patient on the same day of service.    Rose Li MD  11/14/2024

## 2024-11-15 ENCOUNTER — LAB VISIT (OUTPATIENT)
Dept: LAB | Facility: OTHER | Age: 64
End: 2024-11-15
Attending: STUDENT IN AN ORGANIZED HEALTH CARE EDUCATION/TRAINING PROGRAM
Payer: MEDICARE

## 2024-11-15 DIAGNOSIS — E11.9 TYPE 2 DIABETES MELLITUS WITHOUT COMPLICATION, WITHOUT LONG-TERM CURRENT USE OF INSULIN: ICD-10-CM

## 2024-11-15 DIAGNOSIS — E53.8 VITAMIN B12 DEFICIENCY: ICD-10-CM

## 2024-11-15 DIAGNOSIS — E55.9 VITAMIN D DEFICIENCY: ICD-10-CM

## 2024-11-15 DIAGNOSIS — D64.9 NORMOCYTIC ANEMIA: ICD-10-CM

## 2024-11-15 DIAGNOSIS — I10 ESSENTIAL HYPERTENSION: ICD-10-CM

## 2024-11-15 DIAGNOSIS — E78.2 MIXED HYPERLIPIDEMIA: ICD-10-CM

## 2024-11-15 DIAGNOSIS — Z00.00 HEALTH MAINTENANCE EXAMINATION: ICD-10-CM

## 2024-11-15 LAB
25(OH)D3+25(OH)D2 SERPL-MCNC: 7 NG/ML (ref 30–96)
ALBUMIN SERPL BCP-MCNC: 3.3 G/DL (ref 3.5–5.2)
ALP SERPL-CCNC: 110 U/L (ref 40–150)
ALT SERPL W/O P-5'-P-CCNC: 15 U/L (ref 10–44)
ANION GAP SERPL CALC-SCNC: 10 MMOL/L (ref 8–16)
AST SERPL-CCNC: 16 U/L (ref 10–40)
BASOPHILS # BLD AUTO: 0.04 K/UL (ref 0–0.2)
BASOPHILS NFR BLD: 0.9 % (ref 0–1.9)
BILIRUB SERPL-MCNC: 1 MG/DL (ref 0.1–1)
BUN SERPL-MCNC: 14 MG/DL (ref 8–23)
CALCIUM SERPL-MCNC: 9 MG/DL (ref 8.7–10.5)
CHLORIDE SERPL-SCNC: 109 MMOL/L (ref 95–110)
CHOLEST SERPL-MCNC: 157 MG/DL (ref 120–199)
CHOLEST/HDLC SERPL: 2.1 {RATIO} (ref 2–5)
CO2 SERPL-SCNC: 22 MMOL/L (ref 23–29)
CREAT SERPL-MCNC: 0.9 MG/DL (ref 0.5–1.4)
DIFFERENTIAL METHOD BLD: ABNORMAL
EOSINOPHIL # BLD AUTO: 0.3 K/UL (ref 0–0.5)
EOSINOPHIL NFR BLD: 6.1 % (ref 0–8)
ERYTHROCYTE [DISTWIDTH] IN BLOOD BY AUTOMATED COUNT: 14.6 % (ref 11.5–14.5)
EST. GFR  (NO RACE VARIABLE): >60 ML/MIN/1.73 M^2
ESTIMATED AVG GLUCOSE: 169 MG/DL (ref 68–131)
FERRITIN SERPL-MCNC: 71 NG/ML (ref 20–300)
FOLATE SERPL-MCNC: 11.6 NG/ML (ref 4–24)
GLUCOSE SERPL-MCNC: 193 MG/DL (ref 70–110)
HBA1C MFR BLD: 7.5 % (ref 4–5.6)
HCT VFR BLD AUTO: 35.5 % (ref 37–48.5)
HDLC SERPL-MCNC: 74 MG/DL (ref 40–75)
HDLC SERPL: 47.1 % (ref 20–50)
HGB BLD-MCNC: 11.2 G/DL (ref 12–16)
IMM GRANULOCYTES # BLD AUTO: 0.01 K/UL (ref 0–0.04)
IMM GRANULOCYTES NFR BLD AUTO: 0.2 % (ref 0–0.5)
IRON SERPL-MCNC: 73 UG/DL (ref 30–160)
LDLC SERPL CALC-MCNC: 68.4 MG/DL (ref 63–159)
LYMPHOCYTES # BLD AUTO: 1.2 K/UL (ref 1–4.8)
LYMPHOCYTES NFR BLD: 27.6 % (ref 18–48)
MCH RBC QN AUTO: 26.3 PG (ref 27–31)
MCHC RBC AUTO-ENTMCNC: 31.5 G/DL (ref 32–36)
MCV RBC AUTO: 83 FL (ref 82–98)
MONOCYTES # BLD AUTO: 0.5 K/UL (ref 0.3–1)
MONOCYTES NFR BLD: 11.2 % (ref 4–15)
NEUTROPHILS # BLD AUTO: 2.3 K/UL (ref 1.8–7.7)
NEUTROPHILS NFR BLD: 54 % (ref 38–73)
NONHDLC SERPL-MCNC: 83 MG/DL
NRBC BLD-RTO: 0 /100 WBC
PLATELET # BLD AUTO: 300 K/UL (ref 150–450)
PMV BLD AUTO: 10 FL (ref 9.2–12.9)
POTASSIUM SERPL-SCNC: 4 MMOL/L (ref 3.5–5.1)
PROT SERPL-MCNC: 7.4 G/DL (ref 6–8.4)
RBC # BLD AUTO: 4.26 M/UL (ref 4–5.4)
SATURATED IRON: 24 % (ref 20–50)
SODIUM SERPL-SCNC: 141 MMOL/L (ref 136–145)
TOTAL IRON BINDING CAPACITY: 300 UG/DL (ref 250–450)
TRANSFERRIN SERPL-MCNC: 203 MG/DL (ref 200–375)
TRIGL SERPL-MCNC: 73 MG/DL (ref 30–150)
TSH SERPL DL<=0.005 MIU/L-ACNC: 1.05 UIU/ML (ref 0.4–4)
VIT B12 SERPL-MCNC: 312 PG/ML (ref 210–950)
WBC # BLD AUTO: 4.28 K/UL (ref 3.9–12.7)

## 2024-11-15 PROCEDURE — 82607 VITAMIN B-12: CPT | Performed by: STUDENT IN AN ORGANIZED HEALTH CARE EDUCATION/TRAINING PROGRAM

## 2024-11-15 PROCEDURE — 83540 ASSAY OF IRON: CPT | Performed by: STUDENT IN AN ORGANIZED HEALTH CARE EDUCATION/TRAINING PROGRAM

## 2024-11-15 PROCEDURE — 82306 VITAMIN D 25 HYDROXY: CPT | Performed by: STUDENT IN AN ORGANIZED HEALTH CARE EDUCATION/TRAINING PROGRAM

## 2024-11-15 PROCEDURE — 82728 ASSAY OF FERRITIN: CPT | Performed by: STUDENT IN AN ORGANIZED HEALTH CARE EDUCATION/TRAINING PROGRAM

## 2024-11-15 PROCEDURE — 85025 COMPLETE CBC W/AUTO DIFF WBC: CPT | Performed by: STUDENT IN AN ORGANIZED HEALTH CARE EDUCATION/TRAINING PROGRAM

## 2024-11-15 PROCEDURE — 80061 LIPID PANEL: CPT | Performed by: STUDENT IN AN ORGANIZED HEALTH CARE EDUCATION/TRAINING PROGRAM

## 2024-11-15 PROCEDURE — 80053 COMPREHEN METABOLIC PANEL: CPT | Performed by: STUDENT IN AN ORGANIZED HEALTH CARE EDUCATION/TRAINING PROGRAM

## 2024-11-15 PROCEDURE — 84443 ASSAY THYROID STIM HORMONE: CPT | Performed by: STUDENT IN AN ORGANIZED HEALTH CARE EDUCATION/TRAINING PROGRAM

## 2024-11-15 PROCEDURE — 82746 ASSAY OF FOLIC ACID SERUM: CPT | Performed by: STUDENT IN AN ORGANIZED HEALTH CARE EDUCATION/TRAINING PROGRAM

## 2024-11-15 PROCEDURE — 83036 HEMOGLOBIN GLYCOSYLATED A1C: CPT | Performed by: STUDENT IN AN ORGANIZED HEALTH CARE EDUCATION/TRAINING PROGRAM

## 2024-11-15 PROCEDURE — 36415 COLL VENOUS BLD VENIPUNCTURE: CPT | Performed by: STUDENT IN AN ORGANIZED HEALTH CARE EDUCATION/TRAINING PROGRAM

## 2024-12-04 RX ORDER — LEVETIRACETAM 750 MG/1
750 TABLET, EXTENDED RELEASE ORAL NIGHTLY
Qty: 90 TABLET | Refills: 1 | Status: SHIPPED | OUTPATIENT
Start: 2024-12-04 | End: 2025-06-02

## 2024-12-04 NOTE — TELEPHONE ENCOUNTER
Refill Encounter    PCP Visits: Recent Visits  Date Type Provider Dept   11/14/24 Office Visit Rose Li MD [facility]   05/06/24 Office Visit Rose Li MD [facility]   Showing recent visits within past 360 days and meeting all other requirements  Future Appointments  No visits were found meeting these conditions.  Showing future appointments within next 720 days and meeting all other requirements     Last 3 Blood Pressure:   BP Readings from Last 3 Encounters:   11/14/24 (!) 142/78   05/06/24 138/70   01/02/24 (!) 147/77     Preferred Pharmacy:   [id number] - [place], [place] - [address] AT [facility] [address]  [address]  [facility] [address]  Phone: [phone] Fax: [phone]    Requested RX:  Requested Prescriptions     Pending Prescriptions Disp Refills    levetiracetam XR (KEPPRA XR) 750 mg Tb24 tablet 90 tablet 1     Sig: Take 1 tablet (750 mg total) by mouth every evening.      RX Route: Normal

## 2024-12-10 ENCOUNTER — PATIENT MESSAGE (OUTPATIENT)
Dept: INTERNAL MEDICINE | Facility: CLINIC | Age: 64
End: 2024-12-10
Payer: MEDICARE

## 2025-02-21 DIAGNOSIS — Z00.00 ENCOUNTER FOR MEDICARE ANNUAL WELLNESS EXAM: ICD-10-CM

## 2025-02-24 ENCOUNTER — TELEPHONE (OUTPATIENT)
Dept: INTERNAL MEDICINE | Facility: CLINIC | Age: 65
End: 2025-02-24
Payer: MEDICARE

## 2025-02-24 NOTE — TELEPHONE ENCOUNTER
----- Message from Corinne sent at 2/24/2025  9:20 AM CST -----  Regarding: Return Call  Who Called:  Patient Holger Left Message for Patient:   Mono, Does the patient know what this is regarding?  Returning CallBest Call Back Number:  [phone] Additional Information: Patient is returning a call.  Please assist.

## 2025-02-24 NOTE — TELEPHONE ENCOUNTER
----- Message from Joe sent at 2/21/2025  4:53 PM CST -----  Type: Patient CallWho Called: Patient's brother- Kelly the patient know what this is regarding? Requesting a call back to discuss the L90 form that is needing to be filled out. Please adviseDoes the patient rather a call back or a response via [facility]? Call Best Call Back Number: [phone] ( Astrid's Wife - Chhaya)Additional Information:

## 2025-02-27 ENCOUNTER — OFFICE VISIT (OUTPATIENT)
Dept: INTERNAL MEDICINE | Facility: CLINIC | Age: 65
End: 2025-02-27
Payer: MEDICARE

## 2025-02-27 VITALS
WEIGHT: 184.31 LBS | SYSTOLIC BLOOD PRESSURE: 158 MMHG | HEIGHT: 61 IN | HEART RATE: 82 BPM | BODY MASS INDEX: 34.8 KG/M2 | OXYGEN SATURATION: 95 % | DIASTOLIC BLOOD PRESSURE: 79 MMHG

## 2025-02-27 DIAGNOSIS — R53.81 PHYSICAL DEBILITY: ICD-10-CM

## 2025-02-27 DIAGNOSIS — F79 INTELLECTUAL DISABILITY: ICD-10-CM

## 2025-02-27 DIAGNOSIS — F33.1 MODERATE EPISODE OF RECURRENT MAJOR DEPRESSIVE DISORDER: ICD-10-CM

## 2025-02-27 DIAGNOSIS — R41.89 IMPAIRMENT OF COGNITIVE FUNCTION: ICD-10-CM

## 2025-02-27 DIAGNOSIS — E11.65 UNCONTROLLED TYPE 2 DIABETES MELLITUS WITH HYPERGLYCEMIA: ICD-10-CM

## 2025-02-27 DIAGNOSIS — E78.2 MIXED HYPERLIPIDEMIA: ICD-10-CM

## 2025-02-27 DIAGNOSIS — I10 SEVERE UNCONTROLLED HYPERTENSION: Primary | ICD-10-CM

## 2025-02-27 DIAGNOSIS — I10 ESSENTIAL HYPERTENSION: ICD-10-CM

## 2025-02-27 DIAGNOSIS — E66.811 OBESITY (BMI 30.0-34.9): ICD-10-CM

## 2025-02-27 DIAGNOSIS — E88.810 METABOLIC SYNDROME: ICD-10-CM

## 2025-02-27 PROCEDURE — 99214 OFFICE O/P EST MOD 30 MIN: CPT | Mod: PBBFAC | Performed by: INTERNAL MEDICINE

## 2025-02-27 PROCEDURE — 99999 PR PBB SHADOW E&M-EST. PATIENT-LVL IV: CPT | Mod: PBBFAC,,, | Performed by: INTERNAL MEDICINE

## 2025-02-27 RX ORDER — CLONIDINE HYDROCHLORIDE 0.1 MG/1
0.2 TABLET ORAL
Status: COMPLETED | OUTPATIENT
Start: 2025-02-27 | End: 2025-02-27

## 2025-02-27 RX ORDER — AMLODIPINE BESYLATE 10 MG/1
10 TABLET ORAL DAILY
Qty: 90 TABLET | Refills: 1 | Status: SHIPPED | OUTPATIENT
Start: 2025-02-27

## 2025-02-27 RX ORDER — CLONIDINE HYDROCHLORIDE 0.1 MG/1
0.1 TABLET ORAL
Status: COMPLETED | OUTPATIENT
Start: 2025-02-27 | End: 2025-02-27

## 2025-02-27 RX ADMIN — CLONIDINE HYDROCHLORIDE 0.2 MG: 0.1 TABLET ORAL at 11:02

## 2025-02-27 RX ADMIN — CLONIDINE HYDROCHLORIDE 0.1 MG: 0.1 TABLET ORAL at 11:02

## 2025-02-27 NOTE — PROGRESS NOTES
Subjective:      Patient ID: Binta Yang is a 64 y.o. female.    Chief Complaint: Follow-up and Hypertension (Need forms filled out for assistance)      History of Present Illness    CHIEF COMPLAINT:  Patient presents today for follow up with elevated blood pressure.  The patient is a 64-year-old Black woman, accompanied by her sister-in-law, presenting for completion of the 90L form for disability. Her primary care provider is Dr. Li. The patient missed an appointment for her annual wellness checkup at the PCP clinic, where her multiple complex medical conditions are managed, including uncontrolled diabetes, hypertension, hyperlipidemia, depression, allergies, glaucoma, intellectual disability, cognitive impairment, and debility.  At this visit, her blood pressure was significantly elevated at 193/90 mmHg with a pulse rate of 82 beats per minute. According to her sister-in-law, she took 10 mg of amlodipine last night, which was administered by her brother. Despite the notably high systolic blood pressure (193 mmHg), the patient remains asymptomatic, denying any headache, visual blurring, shortness of breath, chest pain, ankle swelling, dizziness, or focal weakness or sensory loss.  This is my first encounter with the patient. I reviewed her medical records, laboratory results, and medications, which took approximately 15 minutes. After taking a detailed history from her sister-in-law, it was noted that the patient requires assistance with tasks such as driving, balance, financial management, and daily medication management, as well as cooking. However, she is independent in all basic activities of daily living (ADLs) such as ambulation, toileting, dressing, bathing, and eating.  The patient has a genetic intellectual disability, along with multiple comorbidities contributing to her current disability status. We will complete the 90L form today for her disability application.  To address her severely  elevated blood pressure, I administered one dose of clonidine to help reduce her systolic pressure. I strongly advised her to follow up with her primary care provider on Monday for further evaluation and treatment.    The patient's initial clinic blood pressure was 193/90 mmHg. After administering the first dose of 0.1 mg oral clonidine, the blood pressure was remeasured 30 minutes later, resulting in a reading of 200/90 mmHg. A second dose of 0.2 mg oral clonidine was then given. Forty-five minutes later, the blood pressure decreased to 158/79 mmHg. The patient was educated and counseled regarding her condition and was safely discharged home.    HYPERTENSION:  She reports blood pressure of 190 today, increased from previous readings in the 140s. She takes amlodipine 10 mg at bedtime but missed today's dose due to earlier than usual wake up time.    MEDICAL HISTORY:  She has a history of mental disability since birth and reports occasional difficulty breathing.    SOCIAL HISTORY:  She lives at home with her brother and sister-in-law who are her primary caregivers. She is independent with showering but requires prompting for personal hygiene. She does not cook due to safety concerns. She is able to walk in the neighborhood.    PREVENTIVE CARE:  She completed her yearly physical exam but missed her mammogram appointment.         Active Problem List with Overview Notes    Diagnosis Date Noted    Major depressive disorder, recurrent, moderate 10/26/2023    Grief 10/26/2023    Overweight (BMI 25.0-29.9) 08/26/2022    Type 2 diabetes mellitus with cataract 05/19/2022    Mild episode of recurrent major depressive disorder 02/22/2022    Seizure 02/22/2022    Intellectual disability 06/24/2021    Aortic atherosclerosis 02/13/2020    Fatigue 02/13/2020    Loose stools 02/13/2020    Colon polyp 08/29/2019    Cholelithiasis 08/29/2019    Internal hemorrhoids 08/29/2019    Seasonal allergic rhinitis 05/29/2019    Intertrigo  05/29/2019    Type 2 diabetes mellitus without complication, without long-term current use of insulin     Cyanocobalamin deficiency     Functional incontinence 12/07/2018    Essential hypertension 12/07/2018    Depression     Normocytic anemia         MEDICATIONS:  Current Medications[1]     Current Outpatient Medications:     amLODIPine (NORVASC) 10 MG tablet, Take 1 tablet (10 mg total) by mouth once daily., Disp: 90 tablet, Rfl: 1    ARIPiprazole (ABILIFY) 2 MG Tab, Take 1 tablet (2 mg total) by mouth once daily., Disp: 30 tablet, Rfl: 11    atorvastatin (LIPITOR) 40 MG tablet, Take 1 tablet (40 mg total) by mouth once daily., Disp: 90 tablet, Rfl: 3    empagliflozin (JARDIANCE) 25 mg tablet, Take 1 tablet (25 mg total) by mouth once daily., Disp: 90 tablet, Rfl: 3    FLUoxetine 10 MG capsule, Take 1 capsule (10 mg total) by mouth once daily. Take with Prozac 20 mg to equal 30 mg daily, Disp: 90 capsule, Rfl: 3    FLUoxetine 20 MG capsule, Take 1 capsule (20 mg total) by mouth once daily., Disp: 90 capsule, Rfl: 1    levetiracetam XR (KEPPRA XR) 750 mg Tb24 tablet, Take 1 tablet (750 mg total) by mouth every evening., Disp: 90 tablet, Rfl: 1    zinc oxide 10 % Oint, Apply 1 Application topically 2 (two) times a day., Disp: 85 g, Rfl: 5    fexofenadine (ALLEGRA) 180 MG tablet, Take 1 tablet (180 mg total) by mouth once daily. (Patient not taking: Reported on 2/27/2025), Disp: 30 tablet, Rfl: 11    flash glucose scanning reader (FREESTYLE ALEJANDRO 14 DAY READER) Misc, Check blood glucose before meals and nightly. (Patient not taking: Reported on 2/27/2025), Disp: 1 each, Rfl: 0    flash glucose sensor (FREESTYLE ALEJANDRO 14 DAY SENSOR) Kit, Apply to skin for 14 days.  Check blood glucose before meals and nightly. (Patient not taking: Reported on 2/27/2025), Disp: 1 kit, Rfl: 11    latanoprost 0.005 % ophthalmic solution, Place 1 drop into both eyes every evening. (Patient not taking: Reported on 2/27/2025), Disp: 2.5 mL,  "Rfl: 6     Current Facility-Administered Medications:     cloNIDine tablet 0.1 mg, 0.1 mg, Oral, 1 time in Clinic/HOD,   ALLERGIES:  Review of patient's allergies indicates:  No Known Allergies     Past Medical History:   Diagnosis Date    Cyanocobalamin deficiency     Depression     Diabetes mellitus     Essential hypertension     Heart disease     childhood surgery    Hyperlipidemia     Mental retardation     Normocytic anemia     Seizures     3-4 per year    Syncope and collapse      Past Surgical History:   Procedure Laterality Date    CARDIAC SURGERY      "hole in the heart", during childhood     SECTION      x2    HYSTERECTOMY      difficulties with personal hygiene     Social History     Social History Narrative    Not on file     Family History   Problem Relation Name Age of Onset    Diabetes Mother      Asthma Mother      Hypertension Mother      Diabetes Father      Hypertension Father      No Known Problems Sister Yu     Prostate cancer Brother Zac     Diverticulitis Brother Zac     Cancer Brother Henry     Prostate cancer Brother Henry     Heart disease Brother Fouzia     Asthma Brother Astrid     Glaucoma Brother Astrid     Heart disease Daughter          ast birth    Intellectual disability Daughter      Congenital heart disease Daughter      Down syndrome Daughter      No Known Problems Son Kwadwo     Breast cancer Neg Hx      Colon cancer Neg Hx      Ovarian cancer Neg Hx      Osteoporosis Neg Hx         Vitals:    25 1013 25 1102 25 1121 25 1205   BP: (!) 193/90 (!) 190/96 (!) 200/90 (!) 158/79   Pulse: 82      SpO2: 95%      Weight: 83.6 kg (184 lb 4.9 oz)      Height: 5' 1" (1.549 m)      PainSc: 0-No pain          Review of Systems   Constitutional:  Negative for chills and fever.   HENT:  Negative for nasal congestion and sore throat.    Eyes:  Negative for visual disturbance.   Respiratory:  Negative for cough and shortness of breath.  "   Cardiovascular:  Negative for chest pain.   Gastrointestinal:  Negative for abdominal pain, constipation and diarrhea.   Endocrine: Negative for polydipsia and polyuria.   Genitourinary:  Negative for difficulty urinating and menstrual problem.   Integumentary:  Negative for rash.   Neurological:  Negative for dizziness.   Hematological:  Bruises/bleeds easily.        Lab Results   Component Value Date    HGBA1C 7.5 (H) 11/15/2024    HGBA1C 6.8 (H) 05/06/2024    HGBA1C 7.0 (H) 11/06/2023     Lab Results   Component Value Date    MICALBCREAT 7.5 08/26/2022     Lab Results   Component Value Date    LDLCALC 68.4 11/15/2024    LDLCALC 76.8 11/06/2023    CHOL 157 11/15/2024    HDL 74 11/15/2024    TRIG 73 11/15/2024       Lab Results   Component Value Date     11/15/2024    K 4.0 11/15/2024     11/15/2024    CO2 22 (L) 11/15/2024     (H) 11/15/2024    BUN 14 11/15/2024    CREATININE 0.9 11/15/2024    CALCIUM 9.0 11/15/2024    PROT 7.4 11/15/2024    ALBUMIN 3.3 (L) 11/15/2024    BILITOT 1.0 11/15/2024    ALKPHOS 110 11/15/2024    AST 16 11/15/2024    ALT 15 11/15/2024    ANIONGAP 10 11/15/2024    ESTGFRAFRICA >60 05/19/2022    EGFRNONAA 54 (A) 05/19/2022    WBC 4.28 11/15/2024    HGB 11.2 (L) 11/15/2024    HGB 11.6 (L) 05/06/2024    HCT 35.5 (L) 11/15/2024    MCV 83 11/15/2024     11/15/2024    TSH 1.055 11/15/2024    HEPCAB Negative 12/21/2018       Lab Results   Component Value Date    VGPPOSFX53QY 7 (L) 11/15/2024    HJSITMLE48RB 11 (L) 11/06/2023    BGVXNAXK29 312 11/15/2024    FERRITIN 71 11/15/2024    IRON 73 11/15/2024    TRANSFERRIN 203 11/15/2024    TIBC 300 11/15/2024    FESATURATED 24 11/15/2024       Objective:   Physical Exam   Physical Exam    Vitals: BP: ___. Hypertensive.  General: No acute distress. Well-developed. Well-nourished.  Eyes: EOMI. Sclerae anicteric.  HENT: Normocephalic. Atraumatic. Nares patent. Moist oral mucosa.  Ears: Bilateral TMs clear. Bilateral EACs  clear.  Cardiovascular: Regular rate. Regular rhythm. No murmurs. No rubs. No gallops. Normal S1, S2.  Respiratory: Normal respiratory effort. Clear to auscultation bilaterally. No rales. No rhonchi. No wheezing.  Abdomen: Soft. Non-tender. Non-distended. Normoactive bowel sounds.  Musculoskeletal: No  obvious deformity.  Extremities: No lower extremity edema.  Neurological: Alert & oriented x3. No slurred speech. Normal gait.  Psychiatric: Normal mood. Normal affect. Good insight. Good judgment.  Skin: Warm. Dry. No rash.         Assessment:     1. Severe uncontrolled hypertension    2. Uncontrolled type 2 diabetes mellitus with hyperglycemia    3. Impairment of cognitive function    4. Intellectual disability    5. Physical debility    6. Obesity (BMI 30.0-34.9)    7. Metabolic syndrome    8. Moderate episode of recurrent major depressive disorder    9. Mixed hyperlipidemia    10. Essential hypertension      Plan:   Assessment & Plan   Severe uncontrolled hypertension  -     cloNIDine tablet 0.1 mg  -     cloNIDine tablet 0.2 mg  -     amLODIPine (NORVASC) 10 MG tablet; Take 1 tablet (10 mg total) by mouth once daily.  Dispense: 90 tablet; Refill: 1    Uncontrolled type 2 diabetes mellitus with hyperglycemia    Impairment of cognitive function    Intellectual disability    Physical debility    Obesity (BMI 30.0-34.9)    Metabolic syndrome    Moderate episode of recurrent major depressive disorder    Mixed hyperlipidemia    Essential hypertension  Comments:  At goal, but elevated at home. Restart Norvasc 10. Reschedule CMP. Discussed well balanced diet, taking walks daily.  Orders:  -     amLODIPine (NORVASC) 10 MG tablet; Take 1 tablet (10 mg total) by mouth once daily.  Dispense: 90 tablet; Refill: 1       Assessment & Plan    IMPRESSION:  - Evaluated patient with significantly elevated blood pressure (190, compared to previous 140s)  - Considered patient's cognitive disability and reliance on caregivers for  medication administration, dependent upon for driving, financial management and cooking and shopping  - Will administer 1 dose of clonidine 0.1 mg to lower blood pressure and ensure safe discharge  - Reviewed patient's usual medication regimen (amlodipine 10 mg at bedtime)  - Noted missed appointments for mammogram and 90l form completion  - fill the  90l form completion for disability application information today  TYPE 2 DIABETES MELLITUS WITH CATARACT:  - Evaluated the patient's vision and inquired about any instances of blurred vision.  - need adjustment of antidiabetic medication for better control her uncontrolled type 2 diabetes with hyperglycemia  MAJOR DEPRESSIVE DISORDER, RECURRENT, MODERATE:  - Patient has had a mental disability since birth, requiring prompting for personal hygiene but capable of basic self-care tasks.  - Lives at home with family caregivers.  - For safety reasons, patient is restricted from cooking, shopping, taking medication, driving transmitted.    SEVERE UNCONTROLLED HYPERTENSION:  - Measured the patient's blood pressure, which was 190, significantly higher than her usual reading of 140.  - Noted that the elevated blood pressure may be due to waking up earlier than usual and not taking medication properly this morning.  - Reviewed the patient's blood pressure history and medication regimen.  - Prescribed clonidine to lower blood pressure.  - Continued amlodipine 10 mg at bedtime.  - Planned to check if blood pressure calms down after administering clonidine.  - Refilled the patient's usual medication, amlodipine 10 mg, to be taken at bedtime.  - The patient's initial clinic blood pressure was 193/90 mmHg. After administering the first dose of 0.1 mg oral clonidine, the blood pressure was remeasured 30 minutes later, resulting in a reading of 200/90 mmHg. A second dose of 0.2 mg oral clonidine was then given. Forty-five minutes later, the blood pressure decreased to 158/79 mmHg. The  patient was educated and counseled regarding her condition and was safely discharged home.  MENTAL DISABILITY/INTELLECTUAL DISABILITY SINCE SHE WAS BORN:  - Observed that the patient has difficulty understanding some questions and may not comprehend symptoms clearly.  - Adjusted communication to accommodate the patient's mental disability.  - Verified that brother and sister-in-law provide care and supervision at home.  - Verified that brother usually administers and assistance her medications taking daily.  - Assessed that the patient can walk in the neighborhood and has no apparent walking problems.  - currently use impaired in instrumental ADL function, admits ADL function but independent in basic ADLs function  FOLLOW UP:  - Instructed to contact PCP Dr. Li's office to reschedule missed appointment for annual wellness checkup and management uncontrolled severe hypertension and type 2 diabetes.  - Schedule missed mammogram appointment for breast cancer screening by PCP provide.         Orders Placed This Encounter    cloNIDine tablet 0.1 mg    cloNIDine tablet 0.2 mg    amLODIPine (NORVASC) 10 MG tablet       Follow up in about 4 days (around 3/3/2025).     Patient Instructions   Follow-up in her PCP Dr. Li clinic in next Monday March 3, 2025 for severe uncontrolled hypertension and uncontrolled diabetes evaluation and treatment  [unfilled]   Tests to Keep You Healthy    Mammogram: ORDERED BUT NOT SCHEDULED  Eye Exam: ORDERED BUT NOT SCHEDULED  Colon Cancer Screening: DUE  Last Blood Pressure <= 139/89 (2/27/2025): NO  Last HbA1c < 8 (11/15/2024): Yes      Visit Checklist (as applicable):  1. Status of new and prior symptoms discussed? yes  2. Imaging reviewed/ ordered as appropriate? yes  3. Lab study reviewed/ ordered as appropriate? yes  4. Plan for work-up and treatment discussed with patient? yes  5. Potential medication side-effects and monitoring plan discussed? yes  6. Review of outside  medical records was performed and pertinent details are summarized in the HPI above? yes     Time spent on this encounter:120 minutes. This includes face to face time and non-face to face time preparing to see the patient (eg, review of tests), obtaining and/or reviewing separately obtained history, documenting clinical information in the electronic or other health record, independently interpreting results (not separately reported) and communicating results to the patient/family/caregiver, or care coordination (not separately reported). Also patient education regarding chronic and acute medical conditions reviewed. Patient handouts given if appropriate.     Each patient to whom he or she provides medical services by telemedicine is:  (1) informed of the relationship between the physician and patient and the respective role of any other health care provider with respect to management of the patient; and (2) notified that he or she may decline to receive medical services by telemedicine and may withdraw from such care at any time.     This note was generated with the assistance of ambient listening technology. Verbal consent was obtained by the patient and accompanying visitor(s) for the recording of patient appointment to facilitate this note. I attest to having reviewed and edited the generated note for accuracy, though some syntax or spelling errors may persist. Please contact the author of this note for any clarification.       Luisito Howell MD  [facility]                             [1]   Current Outpatient Medications:     ARIPiprazole (ABILIFY) 2 MG Tab, Take 1 tablet (2 mg total) by mouth once daily., Disp: 30 tablet, Rfl: 11    atorvastatin (LIPITOR) 40 MG tablet, Take 1 tablet (40 mg total) by mouth once daily., Disp: 90 tablet, Rfl: 3    empagliflozin (JARDIANCE) 25 mg tablet, Take 1 tablet (25 mg total) by mouth once daily., Disp: 90 tablet, Rfl: 3    FLUoxetine 10 MG capsule, Take 1  capsule (10 mg total) by mouth once daily. Take with Prozac 20 mg to equal 30 mg daily, Disp: 90 capsule, Rfl: 3    FLUoxetine 20 MG capsule, Take 1 capsule (20 mg total) by mouth once daily., Disp: 90 capsule, Rfl: 1    levetiracetam XR (KEPPRA XR) 750 mg Tb24 tablet, Take 1 tablet (750 mg total) by mouth every evening., Disp: 90 tablet, Rfl: 1    zinc oxide 10 % Oint, Apply 1 Application topically 2 (two) times a day., Disp: 85 g, Rfl: 5    amLODIPine (NORVASC) 10 MG tablet, Take 1 tablet (10 mg total) by mouth once daily., Disp: 90 tablet, Rfl: 1    fexofenadine (ALLEGRA) 180 MG tablet, Take 1 tablet (180 mg total) by mouth once daily. (Patient not taking: Reported on 2/27/2025), Disp: 30 tablet, Rfl: 11    flash glucose scanning reader (FREESTYLE ALEJANDRO 14 DAY READER) Misc, Check blood glucose before meals and nightly. (Patient not taking: Reported on 2/27/2025), Disp: 1 each, Rfl: 0    flash glucose sensor (FREESTYLE ALEJANDRO 14 DAY SENSOR) Kit, Apply to skin for 14 days.  Check blood glucose before meals and nightly. (Patient not taking: Reported on 2/27/2025), Disp: 1 kit, Rfl: 11    latanoprost 0.005 % ophthalmic solution, Place 1 drop into both eyes every evening. (Patient not taking: Reported on 2/27/2025), Disp: 2.5 mL, Rfl: 6  No current facility-administered medications for this visit.

## 2025-02-27 NOTE — PATIENT INSTRUCTIONS
Follow-up in her PCP Dr. Li clinic in next Monday March 3, 2025 for severe uncontrolled hypertension and uncontrolled diabetes evaluation and treatment

## 2025-03-06 ENCOUNTER — HOSPITAL ENCOUNTER (OUTPATIENT)
Dept: CARDIOLOGY | Facility: OTHER | Age: 65
Discharge: HOME | End: 2025-03-06
Attending: STUDENT IN AN ORGANIZED HEALTH CARE EDUCATION/TRAINING PROGRAM
Payer: MEDICARE

## 2025-03-06 ENCOUNTER — OFFICE VISIT (OUTPATIENT)
Dept: INTERNAL MEDICINE | Facility: CLINIC | Age: 65
End: 2025-03-06
Payer: MEDICARE

## 2025-03-06 VITALS
BODY MASS INDEX: 34.99 KG/M2 | DIASTOLIC BLOOD PRESSURE: 80 MMHG | OXYGEN SATURATION: 100 % | WEIGHT: 185.19 LBS | HEART RATE: 88 BPM | SYSTOLIC BLOOD PRESSURE: 162 MMHG

## 2025-03-06 DIAGNOSIS — E55.9 VITAMIN D DEFICIENCY: ICD-10-CM

## 2025-03-06 DIAGNOSIS — D64.9 NORMOCYTIC ANEMIA: ICD-10-CM

## 2025-03-06 DIAGNOSIS — Z12.12 SCREENING FOR COLORECTAL CANCER: ICD-10-CM

## 2025-03-06 DIAGNOSIS — I70.0 AORTIC ATHEROSCLEROSIS: ICD-10-CM

## 2025-03-06 DIAGNOSIS — I10 ESSENTIAL HYPERTENSION: ICD-10-CM

## 2025-03-06 DIAGNOSIS — R07.9 CHEST PAIN, UNSPECIFIED TYPE: ICD-10-CM

## 2025-03-06 DIAGNOSIS — Z00.00 HEALTH MAINTENANCE EXAMINATION: ICD-10-CM

## 2025-03-06 DIAGNOSIS — I10 ESSENTIAL HYPERTENSION: Primary | ICD-10-CM

## 2025-03-06 DIAGNOSIS — D64.9 ANEMIA, UNSPECIFIED TYPE: ICD-10-CM

## 2025-03-06 DIAGNOSIS — Z12.11 SCREENING FOR COLORECTAL CANCER: ICD-10-CM

## 2025-03-06 DIAGNOSIS — F43.21 GRIEF: ICD-10-CM

## 2025-03-06 DIAGNOSIS — E53.8 VITAMIN B12 DEFICIENCY: ICD-10-CM

## 2025-03-06 DIAGNOSIS — I10 SEVERE UNCONTROLLED HYPERTENSION: ICD-10-CM

## 2025-03-06 DIAGNOSIS — F33.1 MAJOR DEPRESSIVE DISORDER, RECURRENT, MODERATE: ICD-10-CM

## 2025-03-06 DIAGNOSIS — Z12.31 SCREENING MAMMOGRAM FOR BREAST CANCER: ICD-10-CM

## 2025-03-06 DIAGNOSIS — E11.9 TYPE 2 DIABETES MELLITUS WITHOUT COMPLICATION, WITHOUT LONG-TERM CURRENT USE OF INSULIN: ICD-10-CM

## 2025-03-06 PROCEDURE — 99999 PR PBB SHADOW E&M-EST. PATIENT-LVL IV: CPT | Mod: PBBFAC,,, | Performed by: STUDENT IN AN ORGANIZED HEALTH CARE EDUCATION/TRAINING PROGRAM

## 2025-03-06 PROCEDURE — G2211 COMPLEX E/M VISIT ADD ON: HCPCS | Mod: S$PBB,,, | Performed by: STUDENT IN AN ORGANIZED HEALTH CARE EDUCATION/TRAINING PROGRAM

## 2025-03-06 PROCEDURE — 99214 OFFICE O/P EST MOD 30 MIN: CPT | Mod: S$PBB,,, | Performed by: STUDENT IN AN ORGANIZED HEALTH CARE EDUCATION/TRAINING PROGRAM

## 2025-03-06 PROCEDURE — 99214 OFFICE O/P EST MOD 30 MIN: CPT | Mod: PBBFAC | Performed by: STUDENT IN AN ORGANIZED HEALTH CARE EDUCATION/TRAINING PROGRAM

## 2025-03-06 RX ORDER — LANOLIN ALCOHOL/MO/W.PET/CERES
1000 CREAM (GRAM) TOPICAL DAILY
Qty: 90 TABLET | Refills: 3 | Status: SHIPPED | OUTPATIENT
Start: 2025-03-06

## 2025-03-06 RX ORDER — ARIPIPRAZOLE 2 MG/1
2 TABLET ORAL DAILY
Qty: 90 TABLET | Refills: 3 | Status: SHIPPED | OUTPATIENT
Start: 2025-03-06 | End: 2026-03-06

## 2025-03-06 RX ORDER — ATORVASTATIN CALCIUM 40 MG/1
40 TABLET, FILM COATED ORAL DAILY
Qty: 90 TABLET | Refills: 3 | Status: SHIPPED | OUTPATIENT
Start: 2025-03-06

## 2025-03-06 RX ORDER — FLUOXETINE 10 MG/1
10 CAPSULE ORAL DAILY
Qty: 90 CAPSULE | Refills: 3 | Status: SHIPPED | OUTPATIENT
Start: 2025-03-06

## 2025-03-06 RX ORDER — AMLODIPINE BESYLATE 10 MG/1
10 TABLET ORAL DAILY
Qty: 90 TABLET | Refills: 3 | Status: SHIPPED | OUTPATIENT
Start: 2025-03-06

## 2025-03-06 RX ORDER — FLUOXETINE HYDROCHLORIDE 20 MG/1
20 CAPSULE ORAL DAILY
Qty: 90 CAPSULE | Refills: 1 | Status: SHIPPED | OUTPATIENT
Start: 2025-03-06

## 2025-03-06 RX ORDER — ERGOCALCIFEROL 1.25 MG/1
50000 CAPSULE ORAL
Qty: 24 CAPSULE | Refills: 3 | Status: SHIPPED | OUTPATIENT
Start: 2025-03-07

## 2025-03-06 RX ORDER — VALSARTAN 80 MG/1
80 TABLET ORAL DAILY
Qty: 90 TABLET | Refills: 3 | Status: SHIPPED | OUTPATIENT
Start: 2025-03-06 | End: 2026-03-06

## 2025-03-06 NOTE — PROGRESS NOTES
Subjective:       Patient ID: Binta Yang is a 64 y.o. female.    Chief Complaint: Essential hypertension [I10]    Patient is established with me, here today for the following:    T2DM, HTN, HLD, intellectual disability, seizures, depression, vitamin D deficiency, vitamin B12 deficiency    She is accompanied by family who provides supplemental history       History of Present Illness      HYPERTENSION:  She reports continued elevated blood pressure despite monitoring medication intake over the past week. Her son recently discovered unused Amlodipine pills while cleaning, indicating medication non-adherence. She reports increased frequency of dining out over the past two months with minimal home cooking.    CURRENT SYMPTOMS:  She reports experiencing chest pain but denies leg swelling and difficulty breathing. She reports feeling sluggish at times.    CURRENT MEDICATIONS:  She is currently taking Jardiance, Abilify, Atorvastatin, and Fluoxetine.    FAMILY HISTORY:  She has a family history of heart conditions and hypertension. Her brother has a tracheostomy due to severe reaction to antihypertensive medication that resulted in a 2-week coma. Additional family history includes prostate cancer, diabetes, and kidney disease.    LABS:  Blood sugar was elevated. She has mild anemia with a low B12 level of 300. Vitamin D levels were low. She denies any problems with blood in the stool or urine, or bleeding from other places.      ROS:  General: +fatigue  Eyes: -vision changes  Cardiovascular: +chest pain  Respiratory: -shortness of breath  Gastrointestinal: -bright red blood per rectum        Health maintenance -   Colonoscopy performed JULY2019. Told to repeat in 5 years.   Denies family history of colorectal cancer.  Mammogram BI-RADS 1 in DEC2023. Due for repeat.  Denies history of abnormal mammogram.  Denies family history of breast cancer.  Denies family history of ovarian cancer.  Hysterectomy performed after  "childbirth.  Family history of cardiac disease.  UTD on Tdap, COVID, shingles, PCV20, influenza vaccinations.  Due for RSV vaccinations.  Never smoker.  Denies current alcohol use.   Denies drug use.  Completed HIV and hepatitis C screening.          T2DM -   Currently taking Jardiance   Due for foot exam.  Due for eye exam.   Lab Results   Component Value Date    HGBA1C 7.5 (H) 11/15/2024    HGBA1C 6.8 (H) 05/06/2024    HGBA1C 7.0 (H) 11/06/2023     Lab Results   Component Value Date    MICALBCREAT 7.5 08/26/2022              HLD -   Endorses taking atorvastatin as directed  Lab Results   Component Value Date    CHOL 157 11/15/2024     Lab Results   Component Value Date    TRIG 73 11/15/2024     Lab Results   Component Value Date    LDLCALC 68.4 11/15/2024     Lab Results   Component Value Date    HDL 74 11/15/2024     HTN -   Currently prescribed amlodipine.  Due for micro albumin creatinine ratio.   Lab Results   Component Value Date    MICALBCREAT 7.5 08/26/2022     BP Readings from Last 5 Encounters:   02/27/25 (!) 158/79   11/14/24 (!) 142/78   05/06/24 138/70   01/02/24 (!) 147/77   12/13/23 126/80      Following with Dr. Pearce routinely for psychiatry.  Taking fluoxetine and Abilify       Anemia -  Vitamin B12 deficiency -   Lab Results   Component Value Date    HGB 11.2 (L) 11/15/2024    HCT 35.5 (L) 11/15/2024    MCV 83 11/15/2024    RDW 14.6 (H) 11/15/2024     Lab Results   Component Value Date    IRON 73 11/15/2024    TRANSFERRIN 203 11/15/2024    TIBC 300 11/15/2024    FESATURATED 24 11/15/2024      Lab Results   Component Value Date    FERRITIN 71 11/15/2024     Lab Results   Component Value Date    ZNOCMCRM30 312 11/15/2024     Lab Results   Component Value Date    FOLATE 11.6 11/15/2024      Osteopenia -   Vitamin D deficiency -   Completed DEXA in MAY2024.  Showed osteopenia of right and left hips.  "Ten year fracture probability:   Major osteoporotic 4.4%.   Hip 0.6%. "  Denies family history of " osteoporosis.  Lab Results   Component Value Date    [id number] 7 (L) 11/15/2024    [id number] 11 (L) 11/06/2023          Current Outpatient Medications   Medication Instructions    amLODIPine (NORVASC) 10 mg, Oral, Daily    ARIPiprazole (ABILIFY) 2 mg, Oral, Daily    atorvastatin (LIPITOR) 40 mg, Oral, Daily    empagliflozin (JARDIANCE) 25 mg, Oral, Daily    fexofenadine (ALLEGRA) 180 mg, Oral, Daily    flash glucose scanning reader (FREESTYLE ALEJANDRO 14 DAY READER) Misc Check blood glucose before meals and nightly.    flash glucose sensor (FREESTYLE ALEJANDRO 14 DAY SENSOR) Kit Apply to skin for 14 days.  Check blood glucose before meals and nightly.    FLUoxetine 20 mg, Oral, Daily    FLUoxetine 10 mg, Oral, Daily, Take with Prozac 20 mg to equal 30 mg daily    latanoprost 0.005 % ophthalmic solution 1 drop, Both Eyes, Nightly    levetiracetam XR (KEPPRA XR) 750 mg, Oral, Nightly    zinc oxide 10 % Oint 1 Application, Topical (Top), 2 times daily     Objective:      Vitals:    03/06/25 1321   BP: (!) 162/80   Pulse: 88   SpO2: 100%   Weight: 84 kg (185 lb 3 oz)   PainSc: 0-No pain     Body mass index is 34.99 kg/m².    Physical Exam  Vitals reviewed.   Constitutional:       General: She is not in acute distress.     Appearance: Normal appearance. She is not ill-appearing or diaphoretic.   HENT:      Head: Normocephalic and atraumatic.      Right Ear: Tympanic membrane, ear canal and external ear normal. There is no impacted cerumen.      Left Ear: Tympanic membrane, ear canal and external ear normal. There is no impacted cerumen.      Nose: Nose normal. No rhinorrhea.      Mouth/Throat:      Mouth: Mucous membranes are moist.      Pharynx: Oropharynx is clear. No oropharyngeal exudate or posterior oropharyngeal erythema.   Eyes:      General: No scleral icterus.        Right eye: No discharge.         Left eye: No discharge.      Conjunctiva/sclera: Conjunctivae normal.   Neck:      Thyroid: No thyromegaly or  thyroid tenderness.      Trachea: Trachea normal.   Cardiovascular:      Rate and Rhythm: Normal rate and regular rhythm.      Heart sounds: Normal heart sounds. No murmur heard.     No friction rub. No gallop.   Pulmonary:      Effort: Pulmonary effort is normal. No respiratory distress.      Breath sounds: Normal breath sounds. No stridor. No wheezing, rhonchi or rales.   Abdominal:      General: There is no distension.      Palpations: Abdomen is soft.      Tenderness: There is no abdominal tenderness. There is no guarding or rebound.   Musculoskeletal:         General: No swelling or deformity.      Cervical back: Neck supple.   Lymphadenopathy:      Head:      Right side of head: No submandibular or posterior auricular adenopathy.      Left side of head: No submandibular or posterior auricular adenopathy.      Cervical: No cervical adenopathy.      Right cervical: No superficial, deep or posterior cervical adenopathy.     Left cervical: No superficial, deep or posterior cervical adenopathy.      Upper Body:      Right upper body: No supraclavicular adenopathy.      Left upper body: No supraclavicular adenopathy.   Skin:     General: Skin is warm and dry.   Neurological:      General: No focal deficit present.      Mental Status: She is alert. Mental status is at baseline.      Gait: Gait normal.   Psychiatric:         Mood and Affect: Mood normal.         Speech: Speech is delayed.         Behavior: Behavior is cooperative.         Assessment:       1. Essential hypertension    2. Type 2 diabetes mellitus without complication, without long-term current use of insulin    3. Major depressive disorder, recurrent, moderate    4. Aortic atherosclerosis    5. Grief    6. Severe uncontrolled hypertension    7. Essential hypertension    8. Vitamin B12 deficiency    9. Vitamin D deficiency    10. Screening for colorectal cancer    11. Screening mammogram for breast cancer    12. Chest pain, unspecified type    13.  Health maintenance examination    14. Normocytic anemia    15. Type 2 diabetes mellitus without complication, without long-term current use of insulin    16. Anemia, unspecified type        Plan:   Essential hypertension  -     Hypertension Digital Medicine (HDMP) Enrollment Order  -     valsartan (DIOVAN) 80 MG tablet; Take 1 tablet (80 mg total) by mouth once daily.  Dispense: 90 tablet; Refill: 3  -     amLODIPine (NORVASC) 10 MG tablet; Take 1 tablet (10 mg total) by mouth once daily.  Dispense: 90 tablet; Refill: 3  -     Comprehensive Metabolic Panel; Future; Expected date: 05/06/2025  -     Microalbumin/Creatinine Ratio, Urine; Future; Expected date: 05/06/2025    Type 2 diabetes mellitus without complication, without long-term current use of insulin  Comments:  A1C elevated d/t dietary indescretions. Hard to regulate diet; family would benefit from DM edu. A1C today. Jardiance d/t borderline low GFR. Addtl note below.  Orders:  -     empagliflozin (JARDIANCE) 25 mg tablet; Take 1 tablet (25 mg total) by mouth once daily.  Dispense: 90 tablet; Refill: 3  -     Ambulatory referral/consult to Podiatry; Future; Expected date: 03/13/2025  -     Ambulatory referral/consult to Optometry; Future; Expected date: 03/13/2025  -     Stress Echo Which stress agent will be used? Pharmacological; Color Flow Doppler? No; Future  -     Hemoglobin A1C; Future; Expected date: 05/06/2025    Major depressive disorder, recurrent, moderate  -     ARIPiprazole (ABILIFY) 2 MG Tab; Take 1 tablet (2 mg total) by mouth once daily.  Dispense: 90 tablet; Refill: 3  -     FLUoxetine 10 MG capsule; Take 1 capsule (10 mg total) by mouth once daily. Take with Prozac 20 mg to equal 30 mg daily  Dispense: 90 capsule; Refill: 3  -     FLUoxetine 20 MG capsule; Take 1 capsule (20 mg total) by mouth once daily.  Dispense: 90 capsule; Refill: 1    Aortic atherosclerosis  Comments:  FLP, ASCVD intermediate. +DM. 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 Restart Lipitor at 40.  monitor FLP annually.  Orders:  -     atorvastatin (LIPITOR) 40 MG tablet; Take 1 tablet (40 mg total) by mouth once daily.  Dispense: 90 tablet; Refill: 3  -     Stress Echo Which stress agent will be used? Pharmacological; Color Flow Doppler? No; Future    Grief  -     FLUoxetine 20 MG capsule; Take 1 capsule (20 mg total) by mouth once daily.  Dispense: 90 capsule; Refill: 1    Severe uncontrolled hypertension  -     amLODIPine (NORVASC) 10 MG tablet; Take 1 tablet (10 mg total) by mouth once daily.  Dispense: 90 tablet; Refill: 3    Essential hypertension  Comments:  At goal, but elevated at home. Restart Norvasc 10. Reschedule CMP. Discussed well balanced diet, taking walks daily.  Orders:  -     Hypertension Digital Medicine (HDMP) Enrollment Order  -     valsartan (DIOVAN) 80 MG tablet; Take 1 tablet (80 mg total) by mouth once daily.  Dispense: 90 tablet; Refill: 3  -     amLODIPine (NORVASC) 10 MG tablet; Take 1 tablet (10 mg total) by mouth once daily.  Dispense: 90 tablet; Refill: 3  -     Comprehensive Metabolic Panel; Future; Expected date: 05/06/2025  -     Microalbumin/Creatinine Ratio, Urine; Future; Expected date: 05/06/2025    Vitamin B12 deficiency  -     cyanocobalamin (VITAMIN B-12) 1000 MCG tablet; Take 1 tablet (1,000 mcg total) by mouth once daily.  Dispense: 90 tablet; Refill: 3  -     Vitamin B12; Future; Expected date: 05/06/2025    Vitamin D deficiency  -     ergocalciferol (ERGOCALCIFEROL) 50,000 unit Cap; Take 1 capsule (50,000 Units total) by mouth every Monday and Friday.  Dispense: 24 capsule; Refill: 3    Screening for colorectal cancer  -     Ambulatory referral/consult to Endo Procedure ; Future; Expected date: 03/07/2025    Screening mammogram for breast cancer  -     Mammo Digital Screening Bilat w/ Arnold (XPD); Future; Expected date: 03/06/2025    Chest pain, unspecified type  -     SCHEDULED EKG 12-LEAD (to Bastian); Future    Health  maintenance examination  -     CBC Auto Differential; Future; Expected date: 05/06/2025  -     Hemoglobin A1C; Future; Expected date: 05/06/2025  -     Comprehensive Metabolic Panel; Future; Expected date: 05/06/2025  -     Microalbumin/Creatinine Ratio, Urine; Future; Expected date: 05/06/2025    Normocytic anemia  -     CBC Auto Differential; Future; Expected date: 05/06/2025    Type 2 diabetes mellitus without complication, without long-term current use of insulin  -     empagliflozin (JARDIANCE) 25 mg tablet; Take 1 tablet (25 mg total) by mouth once daily.  Dispense: 90 tablet; Refill: 3  -     Ambulatory referral/consult to Podiatry; Future; Expected date: 03/13/2025  -     Ambulatory referral/consult to Optometry; Future; Expected date: 03/13/2025  -     Stress Echo Which stress agent will be used? Pharmacological; Color Flow Doppler? No; Future  -     Hemoglobin A1C; Future; Expected date: 05/06/2025    Anemia, unspecified type  -     CBC Auto Differential; Future; Expected date: 05/06/2025  -     Haptoglobin; Future; Expected date: 05/06/2025  -     Lactate Dehydrogenase; Future; Expected date: 05/06/2025  -     Protein Electrophoresis, Serum; Future; Expected date: 05/06/2025  -     Immunoglobulin Free LT Chains Blood; Future; Expected date: 05/06/2025  -     Immunoglobulins (IgG, IgA, IgM) Quantitative; Future; Expected date: 05/06/2025      Assessment & Plan    IMPRESSION:  - Elevated blood pressure likely due to recent dietary changes and increased sodium intake  - Added Valsartan to current Amlodipine regimen to address persistently elevated blood pressure  - Noted mild anemia; B12 levels low   - Considered B12 supplementation, though may not significantly impact anemia  - Ordered stress test to evaluate potential cardiac issues and rule out heart-related causes of hypertension  - Ordered EKG to assess current cardiac rhythm  - Recommend vitamin D supplementation due to low levels    TYPE 2 DIABETES  MELLITUS WITH CATARACT:  - Referred the patient to an ophthalmologist for eye exam.  - Noted recent increase in blood sugar, possibly due to dietary changes and increased frequency of eating out.  - Advised the patient to consume fewer high-sodium foods and eat at home more frequently to help manage blood sugar.  - Prescribed refills for Jardiance for diabetes management.  - Ordered updated labs in 1-2 months to check A1c levels once the patient resumes regular Jardiance use.  - This will allow assessment of the medication's effectiveness and overall diabetes management.    HYPERTENSION:  - Initiated Valsartan and continued Amlodipine for blood pressure management due to consistently high readings, especially in the clinic.  - Explained that Valsartan has few side effects and provides kidney protection.  - Cautioned about potential severe allergic reactions to Valsartan, including lip swelling, dysphagia, and dyspnea, though rare.  - Advised seeking emergency medical care if such reactions occur.  - Noted patient's son discovered unused Amlodipine pills, indicating non-adherence.  - Advised family to monitor medication compliance.    CARDIAC EVALUATION:  - Ordered an electrocardiogram (EKG) and a stress test.  - Planned additional cardiac testing to evaluate heart function and check for potential blockages.    ANEMIA:  - Initiated vitamin B12 1000 mcg supplement daily due to mildly low B12 level (300) on the low end of normal.  - Noted history of mild anemia with low blood counts in November labs, but normal iron levels and saturation.  - Ordered additional labs to further investigate anemia.    VITAMIN D DEFICIENCY:  - Discussed the importance of vitamin D for bone health and immunity.  - Educated the patient on risks of excessive sun exposure for vitamin D production.  - Prescribed vitamin D2 supplement to be taken twice weekly.    DIABETES:  - Continued Jardiance for glucose management.  - Ensured sufficient  Jardiance supply.  - Scheduled follow up in 2 months (early May) to reassess blood sugar control.    FAMILY HISTORY:  - Noted patient's reported family history of heart conditions, hypertension, diabetes, and kidney issues.           Rose Li MD  3/6/2025

## 2025-03-13 ENCOUNTER — PATIENT MESSAGE (OUTPATIENT)
Dept: UROGYNECOLOGY | Facility: CLINIC | Age: 65
End: 2025-03-13

## 2025-03-13 ENCOUNTER — OFFICE VISIT (OUTPATIENT)
Dept: UROGYNECOLOGY | Facility: CLINIC | Age: 65
End: 2025-03-13
Payer: MEDICARE

## 2025-03-13 VITALS
BODY MASS INDEX: 33.79 KG/M2 | SYSTOLIC BLOOD PRESSURE: 143 MMHG | DIASTOLIC BLOOD PRESSURE: 70 MMHG | WEIGHT: 179 LBS | HEIGHT: 61 IN

## 2025-03-13 DIAGNOSIS — R15.0 INCOMPLETE DEFECATION: ICD-10-CM

## 2025-03-13 DIAGNOSIS — N30.00 ACUTE CYSTITIS WITHOUT HEMATURIA: ICD-10-CM

## 2025-03-13 DIAGNOSIS — R35.1 NOCTURIA: ICD-10-CM

## 2025-03-13 DIAGNOSIS — F79 INTELLECTUAL DISABILITY: ICD-10-CM

## 2025-03-13 DIAGNOSIS — N39.46 MIXED INCONTINENCE URGE AND STRESS: Primary | ICD-10-CM

## 2025-03-13 PROCEDURE — 87186 SC STD MICRODIL/AGAR DIL: CPT | Performed by: OBSTETRICS & GYNECOLOGY

## 2025-03-13 PROCEDURE — 87086 URINE CULTURE/COLONY COUNT: CPT | Performed by: OBSTETRICS & GYNECOLOGY

## 2025-03-13 PROCEDURE — 99999 PR PBB SHADOW E&M-EST. PATIENT-LVL IV: CPT | Mod: PBBFAC,,, | Performed by: OBSTETRICS & GYNECOLOGY

## 2025-03-13 PROCEDURE — 99214 OFFICE O/P EST MOD 30 MIN: CPT | Mod: S$PBB,,, | Performed by: OBSTETRICS & GYNECOLOGY

## 2025-03-13 PROCEDURE — 87088 URINE BACTERIA CULTURE: CPT | Performed by: OBSTETRICS & GYNECOLOGY

## 2025-03-13 PROCEDURE — 99214 OFFICE O/P EST MOD 30 MIN: CPT | Mod: PBBFAC | Performed by: OBSTETRICS & GYNECOLOGY

## 2025-03-13 RX ORDER — CEPHALEXIN 500 MG/1
500 CAPSULE ORAL EVERY 12 HOURS
Qty: 10 CAPSULE | Refills: 0 | Status: SHIPPED | OUTPATIENT
Start: 2025-03-13 | End: 2025-03-18

## 2025-03-13 NOTE — PATIENT INSTRUCTIONS
1.  Mixed urinary incontinence, urge > stress:   --Bladder diary for 3-7 days, write the number of times you go to the rest room and associated symptoms of urgency or leakage.   --Empty bladder every 3 hours.  Empty well: wait a minute, lean forward on toilet.    --Avoid dietary irritants (see sheet).  Keep diary x 3-5 days to determine your irritants.  --KEGELS: do 10 in AM and 10 in PM, holding each x 10 seconds.  When you feel urge to go, STOP, KEGEL, and when urge has passed, then go to bathroom.  Start pelvic floor PT recommend [facility] home health discussed   --URGE: History of seizure on medication, [id number] mg daily.  SE profile reviewed.    Takes 2-4 weeks to see if will have effect.  For dry mouth: get sour, sugar free lozenge or gum.    --STRESS:  Non surgical options: Pessary vs. Impressa vs Rivive - which represent urethral and bladder support devices; Surgical options including 1.  mid urethral sling; retropubic, transobturator vs single incision 2. Fascial slings 3. Lee procedure 4. Periurethral bulking     2.   Fecal incontinence-   --Fiber may better control cholesterol and blood glucose.  Start daily benefiber.  Take 1 tsp of fiber powder (psyllium or other sugar-free powder).  Mix in 8 oz of water.  Take x 3-5 days.  Then, increase fiber by 1 tsp every 3-5 days until stool is easy to pass.  Squatty Potty.   --Keep a bowel diary   --Consider Lomotil in the future  --Pelvic floor PT  --We discussed treatment option including Pelvic floor PT, SNS, and Sphincteroplasty. Risks, benefits reviewed in detail. For now we will start with pelvic floor PT.   --Consider Colorectal evaluation in the future  --Home health Care - [facility] ?  --likely more a functional issue - reviewed ways for help regulate times void ( urinary and BM)     3. Vaginal atrophy (dryness): MR unable to use vaginal estrogen recommend Estrign Rx given \    4. Keflex for UTI

## 2025-03-13 NOTE — PROGRESS NOTES
"  Subjective:       Patient ID: Binta Yang is a 64 y.o. female.    Chief Complaint:  No chief complaint on file.      History of Present Illness  HPI 64 y.o.  female    has a past medical history of Cyanocobalamin deficiency, Depression, Diabetes mellitus, Essential hypertension, Heart disease, Hyperlipidemia, Mental retardation, Normocytic anemia, Seizures, and Syncope and collapse.    Referred by NP for evaluation of Urinary incontinence and fecal incontinence Symptoms have been bothersome for the past several years. She has MR and lives with her brother and sister in law   Most of the history was obtained from her sister in law due to her limited communication.       GYN & OB History  No LMP recorded. Patient is postmenopausal.   Date of Last Pap: No result found    OB History    Para Term  AB Living   2 2 2      SAB IAB Ectopic Multiple Live Births       2      # Outcome Date GA Lbr Jose/2nd Weight Sex Type Anes PTL Lv   2 Term            1 Term              OB     x 0.  C/s x 2.    Largest: 7 # 12 oz   Forceps: unknown  Episiotomy:  unknown  Degree: 3rd or 4th  unknown    GYN  Menarche:  unknown   Menstrual cycle:  Menopause:  Unknown   Hysterectomy:  Open:  indication:  AUB  Ovaries:  removed   Tubal ligation: NO   Other abdominal surgeries:       Past Medical History:   Diagnosis Date    Cyanocobalamin deficiency     Depression     Diabetes mellitus     Essential hypertension     Heart disease     childhood surgery    Hyperlipidemia     Mental retardation     Normocytic anemia     Seizures     3-4 per year    Syncope and collapse      Past Surgical History:   Procedure Laterality Date    CARDIAC SURGERY      "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  Negative for activity change, appetite change, chills, diaphoresis, fatigue, " fever and unexpected weight change.   HENT: Negative.     Eyes: Negative.    Respiratory: Negative.  Negative for apnea, cough and wheezing.    Cardiovascular: Negative.  Negative for chest pain and palpitations.   Gastrointestinal:  Positive for constipation. Negative for abdominal distention, abdominal pain, anal bleeding, blood in stool, diarrhea, nausea, rectal pain and vomiting.        Fecal incontinence    Endocrine: Negative.    Genitourinary:  Positive for frequency and urgency. Negative for decreased urine volume, difficulty urinating, dyspareunia, dysuria, enuresis, flank pain, genital sores, hematuria, menstrual problem, pelvic pain, vaginal bleeding, vaginal discharge and vaginal pain.   Musculoskeletal:  Negative for back pain and gait problem.   Skin:  Negative for color change, pallor, rash and wound.   Allergic/Immunologic: Negative for immunocompromised state.   Neurological: Negative.  Negative for dizziness and speech difficulty.   Hematological:  Negative for adenopathy.   Psychiatric/Behavioral:  Negative for agitation, behavioral problems, confusion and sleep disturbance.            Objective:     Physical Exam   Constitutional: She is oriented to person, place, and time. She appears well-developed.   HENT:   Head: Normocephalic and atraumatic.   Eyes: Conjunctivae and EOM are normal.   Cardiovascular: Normal rate, regular rhythm, S1 normal, S2 normal, normal heart sounds and intact distal pulses.   Pulmonary/Chest: Effort normal and breath sounds normal. She exhibits no tenderness.   Abdominal: Soft. Bowel sounds are normal. She exhibits no distension and no mass. There is no splenomegaly or hepatomegaly. There is no abdominal tenderness. There is no rigidity, no rebound and no guarding. Hernia confirmed negative in the right inguinal area and confirmed negative in the left inguinal area.   Genitourinary: Pelvic exam was performed with patient supine. Rectum normal, vagina normal, skenes  normal and bartholins normal. Right labia normal and left labia normal. Urethra exhibits hypermobility. Urethra exhibits no urethral caruncle, no urethral diverticulum and no urethral mass. Right bartholin is not enlarged and not tender. Left bartholin is not enlarged and not tender. Rectal exam shows resting tone normal and active tone normal. Rectal exam shows no external hemorrhoid, no fissure, no tenderness, anal tone normal and no dovetailing. Guaiac negative stool. There is atrophy in the vagina. No foreign body, tenderness, bleeding, unspecified prolapse of vaginal walls, fistula, mesh exposure or lavator tenderness in the vagina. Right adnexum displays no tenderness. Left adnexum displays no tenderness. Uterus is absent.   PVR: 40 ML  Empty cough stress test: Negative.  Kegel: 2/5    POP-Q  Aa: -2 Ba: -2 C: -5   GH: 3 PB: 2 TVL: 8   Ap: -2 Bp: -2 D:                      Musculoskeletal:         General: Normal range of motion.      Cervical back: Normal range of motion and neck supple.   Neurological: She is alert and oriented to person, place, and time. She has normal strength, normal reflexes and intact cranial nerves (2-12). Cranial nerves II through XII intact. No cranial nerve deficit.   Skin: Skin is warm and dry.   Psychiatric: She has a normal mood and affect. Her speech is normal and behavior is normal. Judgment normal.          Assessment:        1. Mixed incontinence urge and stress    2. Nocturia    3. Acute cystitis without hematuria    4. Incomplete defecation    5. Intellectual disability               Plan:   1.  Mixed urinary incontinence, urge > >stress:   --Empty bladder every 3 hours.  Empty well: wait a minute, lean forward on toilet.    --Avoid dietary irritants (see sheet).  Keep diary x 3-5 days to determine your irritants.  --KEGELS: do 10 in AM and 10 in PM, holding each x 10 seconds.  When you feel urge to go, STOP, KEGEL, and when urge has passed, then go to bathroom.  Start  pelvic floor PT recommend [facility] home health discussed   --URGE: History of seizure on medication, [id number] mg daily.  SE profile reviewed.    Takes 2-4 weeks to see if will have effect.  For dry mouth: get sour, sugar free lozenge or gum.    --STRESS:  Non surgical options: Pessary vs. Impressa vs Rivive - which represent urethral and bladder support devices; Surgical options including 1.  mid urethral sling; retropubic, transobturator vs single incision 2. Fascial slings 3. Lee procedure 4. Periurethral bulking I am less likely to recommend surgery as there is likely functional. Discussed Purewick.     2.   Fecal incontinence-   --Fiber may better control cholesterol and blood glucose.  Start daily benefiber.  Take 1 tsp of fiber powder (psyllium or other sugar-free powder).  Mix in 8 oz of water.  Take x 3-5 days.  Then, increase fiber by 1 tsp every 3-5 days until stool is easy to pass.  Squatty Potty.   --Keep a bowel diary   --Consider Lomotil in the future  --Pelvic floor PT  --We discussed treatment option including Pelvic floor PT, SNS, and Sphincteroplasty. Risks, benefits reviewed in detail. For now we will start with pelvic floor PT.   --Consider Colorectal evaluation in the future  --Home health Care - [facility] ?  --likely more a functional issue - reviewed ways for help regulate times void ( urinary and BM)     3. Vaginal atrophy (dryness): MR unable to use vaginal estrogen recommend Estrign Rx given     4. .UTI  Rx for keflex given     Thank you for requesting consultation of your patient.  I look forward to participating in her care.    Larisa Avilez DO  Female Pelvic Medicine and Reconstructive Surgery  [facility] [place], [place]

## 2025-03-14 ENCOUNTER — OFFICE VISIT (OUTPATIENT)
Dept: OPTOMETRY | Facility: CLINIC | Age: 65
End: 2025-03-14
Payer: MEDICARE

## 2025-03-14 DIAGNOSIS — H25.13 NUCLEAR SCLEROSIS OF BOTH EYES: ICD-10-CM

## 2025-03-14 DIAGNOSIS — E11.9 TYPE 2 DIABETES MELLITUS WITHOUT OPHTHALMIC MANIFESTATIONS: Primary | ICD-10-CM

## 2025-03-14 PROCEDURE — 99213 OFFICE O/P EST LOW 20 MIN: CPT | Mod: PBBFAC | Performed by: OPTOMETRIST

## 2025-03-14 PROCEDURE — 99999 PR PBB SHADOW E&M-EST. PATIENT-LVL III: CPT | Mod: PBBFAC,,, | Performed by: OPTOMETRIST

## 2025-03-14 NOTE — PROGRESS NOTES
HPI    DLE 2024 Dr. Ruelas  Pt. Presents for diabetic eye exam.   Pt. States VA is blurry, straining to see at DV and NV. Hard to see   television or pressing buttons on remote.   Denies F/F/pain.   Was using latanoprost but was not consistent. Refills are .  Hemoglobin A1C       Date                     Value               Ref Range             Status                11/15/2024               7.5 (H)             4.0 - 5.6 %           Final           Last edited by Myranda Muller, OD on 3/14/2025  2:25 PM.            Assessment /Plan     For exam results, see Encounter Report.    Type 2 diabetes mellitus without ophthalmic manifestations  -     Ambulatory referral/consult to Optometry    Nuclear sclerosis of both eyes      1. No retinopathy noted today.  Continued control with primary care physician and annual comprehensive eye exam.     2. Educated pt on findings. Not visually significant. No need for removal at this time. Monitor yearly.      Eyeglass Final Rx       Eyeglass Final Rx         Sphere Cylinder Axis Add    Right -2.25 +1.75 175 +2.50    Left -0.25 Sphere  +2.50      Type: Bifocal    Expiration Date: 3/14/2026                   RTC in 1 year for annual eye exam unless changes noted sooner.

## 2025-03-15 LAB — BACTERIA UR CULT: ABNORMAL

## 2025-03-16 PROBLEM — R35.1 NOCTURIA: Status: ACTIVE | Noted: 2025-03-16

## 2025-03-31 ENCOUNTER — PATIENT MESSAGE (OUTPATIENT)
Dept: ADMINISTRATIVE | Facility: HOSPITAL | Age: 65
End: 2025-03-31
Payer: MEDICARE

## 2025-04-01 ENCOUNTER — OFFICE VISIT (OUTPATIENT)
Dept: PODIATRY | Facility: CLINIC | Age: 65
End: 2025-04-01
Payer: MEDICARE

## 2025-04-01 VITALS
HEART RATE: 85 BPM | WEIGHT: 179 LBS | SYSTOLIC BLOOD PRESSURE: 147 MMHG | DIASTOLIC BLOOD PRESSURE: 75 MMHG | HEIGHT: 61 IN | BODY MASS INDEX: 33.79 KG/M2

## 2025-04-01 DIAGNOSIS — E11.9 ENCOUNTER FOR DIABETIC FOOT EXAM: Primary | ICD-10-CM

## 2025-04-01 DIAGNOSIS — E11.9 TYPE 2 DIABETES MELLITUS WITHOUT COMPLICATION, WITHOUT LONG-TERM CURRENT USE OF INSULIN: ICD-10-CM

## 2025-04-01 PROCEDURE — 99213 OFFICE O/P EST LOW 20 MIN: CPT | Mod: S$PBB,,, | Performed by: PODIATRIST

## 2025-04-01 PROCEDURE — 99999 PR PBB SHADOW E&M-EST. PATIENT-LVL IV: CPT | Mod: PBBFAC,,, | Performed by: PODIATRIST

## 2025-04-01 PROCEDURE — 99214 OFFICE O/P EST MOD 30 MIN: CPT | Mod: PBBFAC,PN | Performed by: PODIATRIST

## 2025-04-01 NOTE — PROGRESS NOTES
Subjective:      Patient ID: Binta Yang is a 64 y.o. female.    Chief Complaint: Diabetic Foot Exam (Foot Exam/PCP Rose Li MD  03/06/25)    Diabetes, increased risk amputation needing evaluation/management/optomization of foot care.  No current concerns.   Athletic lace up shoes.       PCP: Rose Li MD  Last PCP Visit: 3/6/2025         Patient Active Problem List   Diagnosis    Depression    Normocytic anemia    Mixed incontinence urge and stress    Essential hypertension    Cyanocobalamin deficiency    Type 2 diabetes mellitus without complication, without long-term current use of insulin    Seasonal allergic rhinitis    Intertrigo    Colon polyp    Cholelithiasis    Internal hemorrhoids    Aortic atherosclerosis    Fatigue    Loose stools    Intellectual disability    Mild episode of recurrent major depressive disorder    Seizure    Type 2 diabetes mellitus with cataract    Overweight (BMI 25.0-29.9)    Major depressive disorder, recurrent, moderate    Grief    Nocturia         Current Outpatient Medications on File Prior to Visit   Medication Sig Dispense Refill    amLODIPine (NORVASC) 10 MG tablet Take 1 tablet (10 mg total) by mouth once daily. 90 tablet 3    ARIPiprazole (ABILIFY) 2 MG Tab Take 1 tablet (2 mg total) by mouth once daily. 90 tablet 3    atorvastatin (LIPITOR) 40 MG tablet Take 1 tablet (40 mg total) by mouth once daily. 90 tablet 3    cyanocobalamin (VITAMIN B-12) 1000 MCG tablet Take 1 tablet (1,000 mcg total) by mouth once daily. 90 tablet 3    empagliflozin (JARDIANCE) 25 mg tablet Take 1 tablet (25 mg total) by mouth once daily. 90 tablet 3    ergocalciferol (ERGOCALCIFEROL) 50,000 unit Cap Take 1 capsule (50,000 Units total) by mouth every Monday and Friday. 24 capsule 3    estradioL (ESTRING) 2 mg (7.5 mcg /24 hour) vaginal ring Place 2 mg vaginally every 3 (three) months. 1 each 3    fexofenadine (ALLEGRA) 180 MG tablet Take 1 tablet (180 mg total) by mouth  "once daily. 30 tablet 11    flash glucose scanning reader (FREESTYLE ALEJANDRO 14 DAY READER) Misc Check blood glucose before meals and nightly. 1 each 0    flash glucose sensor (FREESTYLE ALEJANDRO 14 DAY SENSOR) Kit Apply to skin for 14 days.  Check blood glucose before meals and nightly. 1 kit 11    FLUoxetine 10 MG capsule Take 1 capsule (10 mg total) by mouth once daily. Take with Prozac 20 mg to equal 30 mg daily 90 capsule 3    FLUoxetine 20 MG capsule Take 1 capsule (20 mg total) by mouth once daily. 90 capsule 1    levetiracetam XR (KEPPRA XR) 750 mg Tb24 tablet Take 1 tablet (750 mg total) by mouth every evening. 90 tablet 1    valsartan (DIOVAN) 80 MG tablet Take 1 tablet (80 mg total) by mouth once daily. 90 tablet 3    vibegron 75 mg Tab Take 1 tablet (75 mg total) by mouth nightly. 30 tablet 11    zinc oxide 10 % Oint Apply 1 Application topically 2 (two) times a day. 85 g 5    latanoprost 0.005 % ophthalmic solution Place 1 drop into both eyes every evening. (Patient not taking: Reported on 3/6/2025) 2.5 mL 6     No current facility-administered medications on file prior to visit.         Review of patient's allergies indicates:  No Known Allergies            Objective:        Vitals:    04/01/25 1156   BP: (!) 147/75   Pulse: 85   Weight: 81.2 kg (179 lb 0.2 oz)   Height: 5' 1" (1.549 m)       Physical Exam  Constitutional:       General: She is not in acute distress.     Appearance: She is well-developed. She is not diaphoretic.     Cardiovascular:      Pulses:           Popliteal pulses are 2+ on the right side and 2+ on the left side.        Dorsalis pedis pulses are 2+ on the right side and 2+ on the left side.        Posterior tibial pulses are 2+ on the right side and 2+ on the left side.      Comments: Capillary refill 3 seconds all toes/distal feet, all toes/both feet warm to touch.      Negative lymphadenopathy bilateral popliteal fossa and tarsal tunnel.     Musculoskeletal:      Right ankle: No " swelling, deformity, ecchymosis or lacerations. Normal range of motion. Normal pulse.      Right Achilles Tendon: Normal. No defects. Ornelas's test negative.      Comments: Patient has hammertoes of digit 2 left                   partially reducible without symptom today.   Otherwise, Normal angle, base, station of gait. All ten toes without clubbing, cyanosis, or signs of ischemia.  No pain to palpation bilateral lower extremities.  Range of motion, stability, muscle strength, and muscle tone normal bilateral feet and legs.        Lymphadenopathy:      Lower Body: No right inguinal adenopathy. No left inguinal adenopathy.      Comments: Negative lymphadenopathy bilateral popliteal fossa and tarsal tunnel.    Negative lymphangitic streaking bilateral feet/ankles/legs.       Skin:     General: Skin is warm and dry.      Capillary Refill: Capillary refill takes 2 to 3 seconds.      Coloration: Skin is not pale.      Findings: No abrasion, bruising, burn, ecchymosis, erythema, laceration, lesion or rash.      Nails: There is no clubbing.      Comments:   Skin is normal age and health appropriate color, turgor, texture, and temperature bilateral lower extremities without ulceration, hyperpigmentation, discoloration, masses nodules or cords palpated.  No ecchymosis, erythema, edema, or cardinal signs of infection bilateral lower extremities.       Neurological:      Mental Status: She is alert and oriented to person, place, and time.      Sensory: No sensory deficit.      Motor: No tremor, atrophy or abnormal muscle tone.      Gait: Gait normal.      Deep Tendon Reflexes:      Reflex Scores:       Patellar reflexes are 2+ on the right side and 2+ on the left side.       Achilles reflexes are 2+ on the right side and 2+ on the left side.     Comments: Negative tinel sign to percussion sural, superficial peroneal, deep peroneal, saphenous, and posterior tibial nerves right and left ankles and feet.      Hemoglobin A1C    Date Value Ref Range Status   11/15/2024 7.5 (H) 4.0 - 5.6 % Final     Comment:     ADA Screening Guidelines:  5.7-6.4%  Consistent with prediabetes  >or=6.5%  Consistent with diabetes    High levels of fetal hemoglobin interfere with the HbA1C  assay. Heterozygous hemoglobin variants (HbS, HgC, etc)do  not significantly interfere with this assay.   However, presence of multiple variants may affect accuracy.     05/06/2024 6.8 (H) 4.0 - 5.6 % Final     Comment:     ADA Screening Guidelines:  5.7-6.4%  Consistent with prediabetes  >or=6.5%  Consistent with diabetes    High levels of fetal hemoglobin interfere with the HbA1C  assay. Heterozygous hemoglobin variants (HbS, HgC, etc)do  not significantly interfere with this assay.   However, presence of multiple variants may affect accuracy.     11/06/2023 7.0 (H) 4.0 - 5.6 % Final     Comment:     ADA Screening Guidelines:  5.7-6.4%  Consistent with prediabetes  >or=6.5%  Consistent with diabetes    High levels of fetal hemoglobin interfere with the HbA1C  assay. Heterozygous hemoglobin variants (HbS, HgC, etc)do  not significantly interfere with this assay.   However, presence of multiple variants may affect accuracy.               Assessment:       Encounter Diagnoses   Name Primary?    Type 2 diabetes mellitus without complication, without long-term current use of insulin     Encounter for diabetic foot exam Yes         Plan:       Binta was seen today for diabetic foot exam.    Diagnoses and all orders for this visit:    Encounter for diabetic foot exam    Type 2 diabetes mellitus without complication, without long-term current use of insulin  Comments:  A1C elevated d/t dietary indescretions. Hard to regulate diet; family would benefit from DM edu. A1C today. Jardiance d/t borderline low GFR. Addtl note below.  Orders:  -     Ambulatory referral/consult to Podiatry      I counseled the patient on her conditions, their implications and medical management.  Shoe  inspection.     Continue good nutrition and blood sugar control to help prevent podiatric complications of diabetes.   Maintain proper foot hygiene.   Continue wearing proper shoe gear, daily foot inspections, never walking without protective shoe gear, never putting sharp instruments to feet.  Annual diabetes foot exam.        I spent a total of 20 minutes on the day of the visit.  This includes face to face time and non-face to face time preparing to see the patient (eg, review of tests), obtaining and/or reviewing separately obtained history, documenting clinical information in the electronic or other health record, independently interpreting results and communicating results to the patient/family/caregiver, or care coordinator.

## 2025-04-21 DIAGNOSIS — N39.46 MIXED INCONTINENCE URGE AND STRESS: ICD-10-CM

## 2025-05-07 ENCOUNTER — OFFICE VISIT (OUTPATIENT)
Dept: INTERNAL MEDICINE | Facility: CLINIC | Age: 65
End: 2025-05-07
Payer: MEDICARE

## 2025-05-07 VITALS
BODY MASS INDEX: 33.82 KG/M2 | DIASTOLIC BLOOD PRESSURE: 75 MMHG | WEIGHT: 179 LBS | SYSTOLIC BLOOD PRESSURE: 147 MMHG | HEART RATE: 85 BPM

## 2025-05-07 DIAGNOSIS — E55.9 VITAMIN D DEFICIENCY: ICD-10-CM

## 2025-05-07 DIAGNOSIS — Z12.11 SCREENING FOR COLORECTAL CANCER: ICD-10-CM

## 2025-05-07 DIAGNOSIS — Z12.31 SCREENING MAMMOGRAM FOR BREAST CANCER: ICD-10-CM

## 2025-05-07 DIAGNOSIS — D64.9 ANEMIA, UNSPECIFIED TYPE: ICD-10-CM

## 2025-05-07 DIAGNOSIS — E53.8 VITAMIN B12 DEFICIENCY: ICD-10-CM

## 2025-05-07 DIAGNOSIS — N39.46 MIXED INCONTINENCE URGE AND STRESS: ICD-10-CM

## 2025-05-07 DIAGNOSIS — D64.9 NORMOCYTIC ANEMIA: ICD-10-CM

## 2025-05-07 DIAGNOSIS — I10 ESSENTIAL HYPERTENSION: ICD-10-CM

## 2025-05-07 DIAGNOSIS — Z12.12 SCREENING FOR COLORECTAL CANCER: ICD-10-CM

## 2025-05-07 DIAGNOSIS — G40.909 SEIZURE DISORDER: ICD-10-CM

## 2025-05-07 DIAGNOSIS — E11.9 TYPE 2 DIABETES MELLITUS WITHOUT COMPLICATION, WITHOUT LONG-TERM CURRENT USE OF INSULIN: Primary | ICD-10-CM

## 2025-05-07 DIAGNOSIS — Z00.00 HEALTH MAINTENANCE EXAMINATION: ICD-10-CM

## 2025-05-07 PROCEDURE — 98006 SYNCH AUDIO-VIDEO EST MOD 30: CPT | Mod: 25,95,, | Performed by: STUDENT IN AN ORGANIZED HEALTH CARE EDUCATION/TRAINING PROGRAM

## 2025-05-07 RX ORDER — ERGOCALCIFEROL 1.25 MG/1
50000 CAPSULE ORAL
Qty: 24 CAPSULE | Refills: 3 | Status: SHIPPED | OUTPATIENT
Start: 2025-05-09

## 2025-05-07 RX ORDER — LEVETIRACETAM 750 MG/1
750 TABLET, EXTENDED RELEASE ORAL NIGHTLY
Qty: 90 TABLET | Refills: 1 | Status: CANCELLED | OUTPATIENT
Start: 2025-05-07 | End: 2025-11-03

## 2025-05-07 RX ORDER — INSULIN PUMP SYRINGE, 3 ML
EACH MISCELLANEOUS
Qty: 1 EACH | Refills: 1 | Status: SHIPPED | OUTPATIENT
Start: 2025-05-07 | End: 2025-05-13 | Stop reason: SDUPTHER

## 2025-05-07 RX ORDER — LANCETS
EACH MISCELLANEOUS
Qty: 200 EACH | Refills: 11 | Status: SHIPPED | OUTPATIENT
Start: 2025-05-07 | End: 2025-05-13 | Stop reason: SDUPTHER

## 2025-05-07 RX ORDER — LEVETIRACETAM 750 MG/1
750 TABLET, EXTENDED RELEASE ORAL NIGHTLY
Qty: 90 TABLET | Refills: 1 | Status: SHIPPED | OUTPATIENT
Start: 2025-05-07 | End: 2025-11-03

## 2025-05-07 NOTE — PROGRESS NOTES
The patient's location is:  [place]  The chief complaint leading to consultation is:  Type 2 diabetes mellitus without complication, without long-term current use of insulin [E11.9]    Visit type: audiovisual    Time spent in discussion with the patient: 11 minutes  21 minutes of total time spent on the encounter. This includes time spent in discussion with the patient and time preparing for the patient appointment: review of tests, obtaining and/or reviewing separately obtained history, documenting clinical information in the electronic or other health record, independently interpreting results (not separately reported), and communicating results to the patient/family/caregiver or care coordination (not separately reported).     Each patient to whom he or she provides medical services by telemedicine is: (1) informed of the relationship between the physician and patient and the respective role of any other health care provider with respect to management of the patient; and (2) notified that he or she may decline to receive medical services by telemedicine and may withdraw from such care at any time.      Subjective:   Binta Yang is a 64 y.o. female.    Patient is established with me, here today for the following:    T2DM, HTN, HLD, intellectual disability, seizures, depression, vitamin D deficiency, vitamin B12 deficiency    She is with family who provides supplemental history    History of Present Illness      MEDICATIONS:  She is currently taking Jardiance for diabetes, amlodipine with recently added Valsartan for blood pressure management, Gemtesa for urinary incontinence prescribed by urogynecologist, Levetiracetam (Keppra), and Vitamin D2 50 mg (5,000 IU).    DIABETES:  She denies performing glucose monitoring at home as she does not have a glucometer.    GENITOURINARY:  She reports recent urinary tract infection that resolved with completion of antibiotic course.      ROS:  Female Genitourinary:  +urinary incontinence       Health maintenance -   Colonoscopy performed JULY2019. Told to repeat in 5 years.   Denies family history of colorectal cancer.  Mammogram BI-RADS 1 in DEC2023.  Denies history of abnormal mammogram.  Denies family history of breast cancer.  Denies family history of ovarian cancer.  Hysterectomy performed after childbirth.  Family history of cardiac disease.  UTD on Tdap, COVID, shingles, PCV20, influenza vaccinations.  Due for RSV vaccinations.  Never smoker.  Denies current alcohol use.   Denies drug use.  Completed HIV and hepatitis C screening.          T2DM -   Currently taking Jardiance   Lab Results   Component Value Date    HGBA1C 7.5 (H) 11/15/2024    HGBA1C 6.8 (H) 05/06/2024    HGBA1C 7.0 (H) 11/06/2023     Lab Results   Component Value Date    MICALBCREAT 7.5 08/26/2022          HLD -   Endorses taking atorvastatin as directed  Lab Results   Component Value Date    CHOL 157 11/15/2024     Lab Results   Component Value Date    TRIG 73 11/15/2024     Lab Results   Component Value Date    LDLCALC 68.4 11/15/2024     Lab Results   Component Value Date    HDL 74 11/15/2024     HTN -   Currently prescribed amlodipine, valsartan.  Lab Results   Component Value Date    MICALBCREAT 7.5 08/26/2022     BP Readings from Last 5 Encounters:   04/01/25 (!) 147/75   03/13/25 (!) 143/70   03/06/25 (!) 162/80   02/27/25 (!) 158/79   11/14/24 (!) 142/78     Following with Dr. Pearce routinely for psychiatry.  Taking fluoxetine and Abilify       Anemia -  Vitamin B12 deficiency -   Lab Results   Component Value Date    HGB 11.2 (L) 11/15/2024    HCT 35.5 (L) 11/15/2024    MCV 83 11/15/2024    RDW 14.6 (H) 11/15/2024     Lab Results   Component Value Date    IRON 73 11/15/2024    TRANSFERRIN 203 11/15/2024    TIBC 300 11/15/2024    FESATURATED 24 11/15/2024      Lab Results   Component Value Date    FERRITIN 71 11/15/2024     Lab Results   Component Value Date    TVJBRDIQ39 312 11/15/2024     Lab  "Results   Component Value Date    FOLATE 11.6 11/15/2024     Osteopenia -   Vitamin D deficiency -   Completed DEXA in MAY2024.  Showed osteopenia of right and left hips.  "Ten year fracture probability:   Major osteoporotic 4.4%.   Hip 0.6%. "  Denies family history of osteoporosis.  Lab Results   Component Value Date    [id number] 7 (L) 11/15/2024    [id number] 11 (L) 11/06/2023       Current Outpatient Medications   Medication Instructions    amLODIPine (NORVASC) 10 mg, Oral, Daily    ARIPiprazole (ABILIFY) 2 mg, Oral, Daily    atorvastatin (LIPITOR) 40 mg, Oral, Daily    cyanocobalamin (VITAMIN B-12) 1,000 mcg, Oral, Daily    empagliflozin (JARDIANCE) 25 mg, Oral, Daily    ergocalciferol (ERGOCALCIFEROL) 50,000 Units, Oral, Every Mon, Fri    estradioL (ESTRING) 2 mg, Vaginal, Every 3 months    fexofenadine (ALLEGRA) 180 mg, Oral, Daily    flash glucose scanning reader ([employer]STYLE ALEJANDRO 14 DAY READER) Misc Check blood glucose before meals and nightly.    flash glucose sensor (FREESTYLE ALEJANDRO 14 DAY SENSOR) Kit Apply to skin for 14 days.  Check blood glucose before meals and nightly.    FLUoxetine 10 mg, Oral, Daily, Take with Prozac 20 mg to equal 30 mg daily    FLUoxetine 20 mg, Oral, Daily    latanoprost 0.005 % ophthalmic solution 1 drop, Both Eyes, Nightly    levetiracetam XR (KEPPRA XR) 750 mg, Oral, Nightly    valsartan (DIOVAN) 80 mg, Oral, Daily    vibegron 75 mg, Oral, Nightly    zinc oxide 10 % Oint 1 Application, Topical (Top), 2 times daily       Objective:     Vitals:    05/07/25 1434   BP: (!) 147/75   Pulse: 85        Physical Exam      Assessment/Plan:   Type 2 diabetes mellitus without complication, without long-term current use of insulin  -     Discontinue: blood-glucose meter kit; To check BG up to 6 times daily, to use with insurance preferred meter  Dispense: 1 each; Refill: 1  -     Discontinue: lancets Misc; To check BG up to 6 times daily, to use with insurance preferred meter  " Dispense: 200 each; Refill: 11  -     Discontinue: blood sugar diagnostic Strp; To check BG up to 6 times daily, to use with insurance preferred meter  Dispense: 200 each; Refill: 11  -     Hemoglobin A1C; Future; Expected date: 07/07/2025    Mixed incontinence urge and stress  -     vibegron 75 mg Tab; Take 1 tablet (75 mg total) by mouth nightly.  Dispense: 90 tablet; Refill: 1  -     Ambulatory referral/consult to Pharmacy Assistance; Future; Expected date: 05/14/2025    Vitamin D deficiency  -     ergocalciferol (ERGOCALCIFEROL) 50,000 unit Cap; Take 1 capsule (50,000 Units total) by mouth every Monday and Friday.  Dispense: 24 capsule; Refill: 3    Anemia, unspecified type  -     Immunoglobulins (IgG, IgA, IgM) Quantitative; Future; Expected date: 07/07/2025  -     Immunoglobulin Free LT Chains Blood; Future; Expected date: 07/07/2025  -     Protein Electrophoresis, Serum; Future; Expected date: 07/07/2025  -     Lactate Dehydrogenase; Future; Expected date: 07/07/2025  -     Haptoglobin; Future; Expected date: 07/07/2025  -     CBC Auto Differential; Future; Expected date: 07/07/2025    Vitamin B12 deficiency  -     Vitamin B12; Future; Expected date: 07/07/2025    Essential hypertension  -     Microalbumin/Creatinine Ratio, Urine; Future; Expected date: 07/07/2025  -     Comprehensive Metabolic Panel; Future; Expected date: 07/07/2025    Health maintenance examination  -     Microalbumin/Creatinine Ratio, Urine; Future; Expected date: 07/07/2025  -     Comprehensive Metabolic Panel; Future; Expected date: 07/07/2025  -     Hemoglobin A1C; Future; Expected date: 07/07/2025  -     CBC Auto Differential; Future; Expected date: 07/07/2025    Normocytic anemia  -     CBC Auto Differential; Future; Expected date: 07/07/2025    Seizure disorder  -     levetiracetam XR (KEPPRA XR) 750 mg Tb24 tablet; Take 1 tablet (750 mg total) by mouth every evening.  Dispense: 90 tablet; Refill: 1    Screening for colorectal  cancer  -     Ambulatory referral/consult to Endo Procedure ; Future; Expected date: 05/08/2025    Screening mammogram for breast cancer  -     Mammo Digital Screening Bilat w/ Arnold (XPD); Future; Expected date: 05/07/2025         Assessment & Plan      IMPRESSION:  - Assessed glucose control with Jardiance and continued for diabetes management.  - Monitoring BP control with combination of amlodipine and recently added Valsartan, both continued.  - Evaluated effectiveness of Gemtesa for urinary incontinence prescribed by urogynecologist and refilled pending insurance coverage review.  - Considered pharmacy assistance options for Gemtesa due to insurance coverage issues.    PLAN SUMMARY:  - Order CBC, CMP, and A1C labs  - Schedule mammogram and colonoscopy  - Continue Jardiance for blood sugar management  - Continue Levetiracetam (Keppra) for convulsions  - Continue Gemtesa for urinary incontinence  - Prescribe vitamin D supplement  - Continue amlodipine and Valsartan for hypertension  - Prescribe glucometer for home blood sugar testing  - Follow-up in 2-3 weeks for blood pressure check    SEIZURE:  - Continue Levetiracetam (Keppra) for convulsion management.    TYPE 2 DIABETES MELLITUS:  - Noted patient does not currently test blood sugar at home due to lack of a glucometer.  - Prescribed a glucometer for periodic monitoring to be sent to the pharmacy.  - Instructed patient to check fasting glucose 2 times per week in the morning using finger prick method.  - Ordered CBC, CMP, and A1C to evaluate glucose control, as the last labs were from November last year.  - Continued Jardiance for blood sugar management.    HYPERTENSION:  - Blood pressure has been well-controlled, running around 118 when checked, with improved control noted after the addition of Valsartan to amlodipine.  - Continued both medications for hypertension management.  - Instructed patient to continue regular blood pressure monitoring and to  check again in 2-3 weeks.    URINARY INCONTINENCE:  - Continued Gemtesa as it is working well for urinary incontinence management.    VITAMIN D DEFICIENCY:  - Prescribed vitamin D supplement, advising patient it can be obtained either by prescription or over the counter.    HISTORY OF URINARY TRACT INFECTION:  - Noted the patient had an infection that resolved with antibiotic therapy.    FOLLOW-UP AND PREVENTIVE CARE:  - Ordered mammogram and colonoscopy.           Rose Li MD  05/07/2025

## 2025-05-07 NOTE — PROGRESS NOTES
Khai     Ms. Yang's case has been assigned to Clara Edu @[phone].       What happens next? Assigned advocate will review your patients chart and research available options.  Patient will be contacted in approximately 2 to 3 business days and may be asked to provide specific documentation to help determine eligibility. Failure to provide requested documentation will delay assistance.    Please note all requests are subject to program availability and patient eligibility verification.   Please note all PAP application submissions are reviewed on a case-by-case basis in accordance with program criteria.   Please note epic chart must reflect a current order for the requested medication written by an [facility] provider to begin PAP process.   Provider may review progress notes by typing pharmacy patient assistance in Epic search box.       Thank you,   [facility]  [address] [facility] [id number]  [place] [address]  Fax: [phone]  Email: [email or web address]

## 2025-05-08 ENCOUNTER — TELEPHONE (OUTPATIENT)
Dept: PHARMACY | Facility: CLINIC | Age: 65
End: 2025-05-08
Payer: MEDICARE

## 2025-05-08 ENCOUNTER — RESULTS FOLLOW-UP (OUTPATIENT)
Dept: PHARMACY | Facility: CLINIC | Age: 65
End: 2025-05-08

## 2025-05-08 NOTE — LETTER
May 8, 2025    Binta Yang  [facility] [facility] [address]       Dear Ms. Yang,      My name is Clara Sorensen.  I am reaching out on behalf of [facility]s Pharmacy Patient Assistance Team after receiving a referral from your Provider inquiring about assistance with your medication.  We have reviewed your current medication list and/or insurance status. Unfortunately, The Pharmacy Patient Assistance Team is unable to assist you with PAP enrollment at this time for the following reason(s):      There are currently no manufacture or co-pay savings programs available for requested medication based on the prescreened information listed in the chart       You may follow-up with your provider at your convenience regarding next steps.             Sincerely,   Clara Edu @[phone]  Pharmacy Patient Assistance  [address] [place], [place] [address] [place] [id number]  Fax: [phone]  Email: [email or web address]

## 2025-05-09 NOTE — TELEPHONE ENCOUNTER
----- Message from Az Elizabeth sent at 5/7/2025  5:23 PM CDT -----  Regarding: Order for NOVA MARCUS,     Ms. Trey's case has been assigned to Clara Sorensen @[phone].       What happens next? Assigned advocate will review your patients chart and research available options.  Patient will be contacted in approximately 2 to 3 business days and may be asked to provide specific documentation to help determine eligibility. Failure to provide requested documentation will delay   assistance.    Please note all requests are subject to program availability and patient eligibility verification.   Please note all PAP application submissions are reviewed on a case-by-case basis in accordance with program criteria.   Please note epic chart must reflect a current order for the requested medication written by an [facility] provider to begin PAP process.   Provider may review progress notes by typing pharmacy patient assistance in Epic search box.       Thank you,   [facility]  [address] [facility] [id number]  [place] [address]  Fax: [phone]  Email: [email or web address]      ----- Message -----  From: Rose Li MD  Sent: 5/7/2025   2:51 PM CDT  To: Pharmacy Patient Assistance Team  Subject: Order for NOVA MARCUS

## 2025-05-09 NOTE — TELEPHONE ENCOUNTER
Currently we are unable to assist with PAP enrollment for the following reason(s):      There are currently no manufacture or co-pay savings programs available for requested medication based on the prescreened information listed in the chart         Clara Sorensen @[phone]  Pharmacy Patient Assistance Team

## 2025-05-12 ENCOUNTER — CLINICAL SUPPORT (OUTPATIENT)
Dept: ENDOSCOPY | Facility: HOSPITAL | Age: 65
End: 2025-05-12
Attending: STUDENT IN AN ORGANIZED HEALTH CARE EDUCATION/TRAINING PROGRAM
Payer: MEDICARE

## 2025-05-12 ENCOUNTER — TELEPHONE (OUTPATIENT)
Dept: ENDOSCOPY | Facility: HOSPITAL | Age: 65
End: 2025-05-12

## 2025-05-12 VITALS — WEIGHT: 179 LBS | BODY MASS INDEX: 33.79 KG/M2 | HEIGHT: 61 IN

## 2025-05-12 DIAGNOSIS — Z12.11 SCREENING FOR COLORECTAL CANCER: ICD-10-CM

## 2025-05-12 DIAGNOSIS — Z12.12 SCREENING FOR COLORECTAL CANCER: ICD-10-CM

## 2025-05-12 DIAGNOSIS — Z12.11 SCREEN FOR COLON CANCER: Primary | ICD-10-CM

## 2025-05-12 RX ORDER — SODIUM, POTASSIUM,MAG SULFATES 17.5-3.13G
1 SOLUTION, RECONSTITUTED, ORAL ORAL DAILY
Qty: 1 KIT | Refills: 0 | Status: SHIPPED | OUTPATIENT
Start: 2025-05-12 | End: 2025-05-14

## 2025-05-12 NOTE — PATIENT INSTRUCTIONS
Colonoscopy Procedure Prep Instructions      Date of procedure: 05/19/2025 Arrive at: 09:00AM    Location of Department:   [facility] [address] [place] [address]  Take the Elevators to 2nd Floor Endoscopy Procedural Area    As soon as possible:   your prep from pharmacy and over the counter DULCOLAX LAXATIVE TABLETS     On the day before your procedure   What You CAN do:   You may have clear liquids ONLY -see below for list.     Liquids That Are OK to Drink:   Water  Sports drinks (Gatorade, Power-Aid)  Coffee or tea (no cream or nondairy creamer)  Clear juices without pulp (apple, white grape)  Gelatin desserts (no fruit or toppings)  Clear soda (sprite, coke, ginger ale)  Chicken broth (until 12 midnight the night before procedure)      What You CANNOT do:   Do not EAT solid food, drink milk or anything   colored red.  Do not drink alcohol.  Do not take oral medications within 1 hour of starting   each dose of SUPREP.  No gum chewing or candy morning of procedure.      Note:   (Please disregard the insert instructions from pharmacy).  SUPREP Bowel Prep Kit is indicated for cleansing of the colon as a preparation for colonoscopy in adults.   Be sure to tell your doctor about all the medicines you take, including prescription and non-prescription medicines, vitamins, and herbal supplements. SUPREP Bowel Prep Kit may affect how other medicines work.  Medication taken by mouth may not be absorbed properly when taken within 1 hour before the start of each dose of SUPREP Bowel Prep Kit.    It is not uncommon to experience some abdominal cramping, nausea and/or vomiting when taking the prep. If you have nausea and/or vomiting while taking the prep, stop drinking for 20 to 30 minutes then continue.    How to take prep:    SUPREP Bowel Prep Kit is a (2-day) prep.   Both 6-ounce bottles are required for a complete preparation for colonoscopy. Dilute the solution concentrate as  directed prior to use. You must drink water with each dose of SUPREP, and additional water after each dose.    DOSE 1--Day Before Colonoscopy 05/18/2025    Drink at least 6 to 8 glasses of clear liquids from time you wake up until you begin your prep and then continue until bedtime to avoid dehydration.     12:00 pm (NOON) Take four (4) Dulcolax (Bisacodyl) tablets with at least 8 ounces or more of clear liquids.      6:00 pm:    You must complete Steps 1 through 4 using one (1) 6-ounce bottle before going to bed as shown below:    Step 1-Pour ONE (1) 6-ounce bottle of SUPREP liquid into the mixing container.  Step 2-Add cool drinking water to the 16-ounce line on the container and mix.  Step 3-Drink ALL the liquid in the container.  Step 4-You must drink two (2) more 16-ounce containers of water over the next 1 hour.  IMPORTANT: If you experience preparation-related symptoms (for example, nausea, bloating, or cramping), stop, or slow the rate of drinking the additional water until your symptoms decrease.    DOSE 2--Day of the Colonoscopy 05/19/2025 at 2-3 AM.    For this dose, repeat Steps 1 through 4 shown above using the other 6-ounce bottle.   You may continue drinking water/clear liquids until   4 hours before your colonoscopy or as directed by the scheduling nurse  05:00AM.    For information about your procedure, two (2) things to view prior to colonoscopy:  Please watch this informational video. It is important to watch this animated consent video prior to your arrival. If you haven't watched the video prior to arriving, you are required to watch it during admission which can causes delays.    Options for viewing:   Using a keyboard:  press and hold the control tab (Ctrl) and left mouse click to follow links.           Colonoscopy Instructional Video                                                                                   OR    Type link address into your web browser's address  bar:  [email or web address]      Educational Booklet with pictures:      Colonoscopy Prep - Liquid      Comments:          IMPORTANT INFORMATION TO KNOW BEFORE YOUR PROCEDURE    [facility] 2nd Floor         If your procedure requires the administration of anesthesia, it is necessary for a responsible adult to drive you home. (Medical Transportation, Uber, Lyft, Taxi, etc. may ONLY be used if a responsible adult is present to accompany you home.  The responsible adult CAN'T be the  of the service).      person must be available to return to pick you up within 15 minutes of being notified of discharge.       Please bring a picture ID, insurance card, & copayment      Take Medications as directed below:          If you are taking the oral medication(s):   Invokana (Canagliflozin), Farxiga (Dapagliflozin), Jardiance (Empagliflozin), Invokamet/Invokamet XR (invokana +metformin), Xigduo (farxiga + metformin), Synjardy XR (jardiance + metformin), hold 3 days prior to your procedure, please stop taking on 05/16/2025.       If you begin taking any blood thinning medications, injectable weight loss/diabetes medications (other than insulin) , or Adipex (Phentermine) please contact the endoscopy scheduling department listed below as soon as possible.    If you are diabetic see the attached instruction sheet regarding your medication.     If you take HEART, BLOOD PRESSURE, SEIZURE, PAIN, LUNG (including inhalers/nebulizers), ANTI-REJECTION (transplant patients), or PSYCHIATRIC medications, please take at your regular times with a sip of water or as directed by the scheduling nurse.     Important contact information:    Endoscopy [phone] Hours of operation Monday-Friday 8:00-4:30pm.    Questions about insurance or financial obligations call [phone] or [phone].    If you have questions regarding the prep or need to reschedule, please call  [phone]. After hours questions requiring immediate assistance, contact [facility] On-Call nurse line at [phone] or [phone].   NOTE:     On occasion, unforeseen circumstances may cause a delay in your procedure start time. We respect your time and appreciate your patience during these circumstances.      Comments:

## 2025-05-13 DIAGNOSIS — E11.9 TYPE 2 DIABETES MELLITUS WITHOUT COMPLICATION, WITHOUT LONG-TERM CURRENT USE OF INSULIN: ICD-10-CM

## 2025-05-13 RX ORDER — INSULIN PUMP SYRINGE, 3 ML
EACH MISCELLANEOUS
Qty: 1 EACH | Refills: 1 | Status: SHIPPED | OUTPATIENT
Start: 2025-05-13

## 2025-05-13 RX ORDER — LANCETS
EACH MISCELLANEOUS
Qty: 200 EACH | Refills: 11 | Status: SHIPPED | OUTPATIENT
Start: 2025-05-13

## 2025-05-13 NOTE — TELEPHONE ENCOUNTER
Care Due:                  Date            Visit Type   Department     Provider  --------------------------------------------------------------------------------                                ESTABLISHED                              PATIENT -    [facility] INTERNAL  Last Visit: 05-      [facility]       Rose Li                              EP -                              PRIMARY      [facility] INTERNAL  Next Visit: 08-      CARE ([facility])   MEDICINE       [facility]                                                            Last  Test          Frequency    Reason                     Performed    Due Date  --------------------------------------------------------------------------------    HBA1C.......  6 months...  empagliflozin............  11-   05-    [facility] Embedded Care Due Messages. Reference number: [id number].   5/13/2025 11:38:12 AM CDT

## 2025-05-15 NOTE — H&P
Short Stay Endoscopy History and Physical    PCP - Rose Li MD  Referring Physician - Rose Li MD  [address] [facility] [facility]  [address]  [place] [address]    Procedure - Colonoscopy  ASA - per anesthesia  Mallampati - per anesthesia  History of Anesthesia problems - no  Family history Anesthesia problems -  no   Plan of anesthesia - General    HPI  64 y.o. female  Reason for procedure:   Screening for colorectal cancer [Z12.11, Z12.12]    2019 cscope with sigmoid polyp and hemorrhoids   Two brothers with CRC     ROS:  Constitutional: No fevers, chills, No weight loss  CV: No chest pain  Pulm: No cough, No shortness of breath  GI: see HPI    Medical History:  has a past medical history of Cyanocobalamin deficiency, Depression, Diabetes mellitus, Essential hypertension, Heart disease, Hyperlipidemia, Mental retardation, Normocytic anemia, Seizures, and Syncope and collapse.    Surgical History:  has a past surgical history that includes Cardiac surgery; Hysterectomy; and  section.    Family History: family history includes Asthma in her brother and mother; Cancer in her brother; Congenital heart disease in her daughter; Diabetes in her father and mother; Diverticulitis in her brother; Down syndrome in her daughter; Glaucoma in her brother; Heart disease in her brother and daughter; Hypertension in her father and mother; Intellectual disability in her daughter; No Known Problems in her sister and son; Prostate cancer in her brother and brother..    Social History:  reports that she has never smoked. She has never used smokeless tobacco. She reports that she does not drink alcohol and does not use drugs.    Review of patient's allergies indicates:  No Known Allergies    Medications:   Prescriptions Prior to Admission[1]    Physical Exam:    Vital Signs: There were no vitals filed for this visit.    General Appearance: Well appearing in no acute distress  Abdomen: Soft, non tender, non  distended with normal bowel sounds, no masses    Labs:  Lab Results   Component Value Date    WBC 4.28 11/15/2024    HGB 11.2 (L) 11/15/2024    HCT 35.5 (L) 11/15/2024     11/15/2024    CHOL 157 11/15/2024    TRIG 73 11/15/2024    HDL 74 11/15/2024    ALT 15 11/15/2024    AST 16 11/15/2024     11/15/2024    K 4.0 11/15/2024     11/15/2024    CREATININE 0.9 11/15/2024    BUN 14 11/15/2024    CO2 22 (L) 11/15/2024    TSH 1.055 11/15/2024    HGBA1C 7.5 (H) 11/15/2024       I have explained the risks and benefits of this endoscopic procedure to the patient including but not limited to bleeding, inflammation, infection, perforation, and death.    Assessment/Plan:     CRC Surveillance, FH of CRC in 1st degree relative     - Proceed with colonoscopy     Keira Garcia MD  Gastroenterology   [facility]          [1]   Medications Prior to Admission   Medication Sig Dispense Refill Last Dose/Taking    amLODIPine (NORVASC) 10 MG tablet Take 1 tablet (10 mg total) by mouth once daily. 90 tablet 3     ARIPiprazole (ABILIFY) 2 MG Tab Take 1 tablet (2 mg total) by mouth once daily. 90 tablet 3     atorvastatin (LIPITOR) 40 MG tablet Take 1 tablet (40 mg total) by mouth once daily. 90 tablet 3     blood sugar diagnostic Strp To check BG up to 6 times daily, to use with insurance preferred meter 200 each 11     blood-glucose meter kit To check BG up to 6 times daily, to use with insurance preferred meter 1 each 1     cyanocobalamin (VITAMIN B-12) 1000 MCG tablet Take 1 tablet (1,000 mcg total) by mouth once daily. 90 tablet 3     empagliflozin (JARDIANCE) 25 mg tablet Take 1 tablet (25 mg total) by mouth once daily. 90 tablet 3     ergocalciferol (ERGOCALCIFEROL) 50,000 unit Cap Take 1 capsule (50,000 Units total) by mouth every Monday and Friday. 24 capsule 3     estradioL (ESTRING) 2 mg (7.5 mcg /24 hour) vaginal ring Place 2 mg vaginally every 3 (three) months. 1 each 3     fexofenadine (ALLEGRA) 180  MG tablet Take 1 tablet (180 mg total) by mouth once daily. 30 tablet 11     flash glucose scanning reader (FREESTYLE ALEJANDRO 14 DAY READER) Misc Check blood glucose before meals and nightly. 1 each 0     flash glucose sensor (FREESTYLE ALEJANDRO 14 DAY SENSOR) Kit Apply to skin for 14 days.  Check blood glucose before meals and nightly. 1 kit 11     FLUoxetine 10 MG capsule Take 1 capsule (10 mg total) by mouth once daily. Take with Prozac 20 mg to equal 30 mg daily 90 capsule 3     FLUoxetine 20 MG capsule Take 1 capsule (20 mg total) by mouth once daily. 90 capsule 1     lancets [facility] To check BG up to 6 times daily, to use with insurance preferred meter 200 each 11     latanoprost 0.005 % ophthalmic solution Place 1 drop into both eyes every evening. (Patient not taking: Reported on 3/6/2025) 2.5 mL 6     levetiracetam XR (KEPPRA XR) 750 mg Tb24 tablet Take 1 tablet (750 mg total) by mouth every evening. 90 tablet 1     valsartan (DIOVAN) 80 MG tablet Take 1 tablet (80 mg total) by mouth once daily. 90 tablet 3     vibegron 75 mg Tab Take 1 tablet (75 mg total) by mouth nightly. 90 tablet 1     zinc oxide 10 % Oint Apply 1 Application topically 2 (two) times a day. 85 g 5

## 2025-05-16 ENCOUNTER — ANESTHESIA EVENT (OUTPATIENT)
Dept: ENDOSCOPY | Facility: HOSPITAL | Age: 65
End: 2025-05-16
Payer: MEDICARE

## 2025-05-16 NOTE — ANESTHESIA PREPROCEDURE EVALUATION
"                                                                                                             2025  Binta Yang is a 64 y.o., female.    Past Medical History:   Diagnosis Date    Cyanocobalamin deficiency     Depression     Diabetes mellitus     Essential hypertension     Heart disease     childhood surgery    Hyperlipidemia     Mental retardation     Normocytic anemia     Seizures     3-4 per year    Syncope and collapse      Patient Active Problem List   Diagnosis    Depression    Normocytic anemia    Mixed incontinence urge and stress    Essential hypertension    Cyanocobalamin deficiency    Type 2 diabetes mellitus without complication, without long-term current use of insulin    Seasonal allergic rhinitis    Intertrigo    Colon polyp    Cholelithiasis    Internal hemorrhoids    Aortic atherosclerosis    Fatigue    Loose stools    Intellectual disability    Mild episode of recurrent major depressive disorder    Seizure    Type 2 diabetes mellitus with cataract    Overweight (BMI 25.0-29.9)    Major depressive disorder, recurrent, moderate    Grief    Nocturia       Past Surgical History:   Procedure Laterality Date    CARDIAC SURGERY      "hole in the heart", during childhood     SECTION      x2    HYSTERECTOMY      difficulties with personal hygiene       Pre-op Assessment    I have reviewed the Patient Summary Reports.     I have reviewed the Nursing Notes. I have reviewed the NPO Status.   I have reviewed the Medications.     Review of Systems  Anesthesia Hx:  No problems with previous Anesthesia             Denies Family Hx of Anesthesia complications.    Denies Personal Hx of Anesthesia complications.                    Hematology/Oncology:  Hematology Normal   Oncology Normal                                   EENT/Dental:  EENT/Dental Normal           Cardiovascular:  Exercise tolerance: good   Hypertension              ECG has been reviewed.    Functional Capacity low " / < 4 METS                         Pulmonary:  Pulmonary Normal                       Renal/:  Renal/ Normal                 Hepatic/GI:  Hepatic/GI Normal                    Musculoskeletal:  Musculoskeletal Normal                Neurological:       Seizures                                Endocrine:  Diabetes, type 2           Dermatological:  Skin Normal    Psych:    depression                Physical Exam  General: Well nourished, Cooperative, Alert and Oriented    Airway:  Mallampati: II   Mouth Opening: Normal  TM Distance: Normal  Tongue: Normal  Neck ROM: Normal ROM    Dental:  Intact        Anesthesia Plan  Type of Anesthesia, risks & benefits discussed:    Anesthesia Type: Gen Natural Airway  Intra-op Monitoring Plan: Standard ASA Monitors  Post Op Pain Control Plan: multimodal analgesia  Induction:  IV  Informed Consent: Informed consent signed with the Patient and all parties understand the risks and agree with anesthesia plan.  All questions answered.   ASA Score: 2  Day of Surgery Review of History & Physical: H&P Update referred to the surgeon/provider.I have interviewed and examined the patient. I have reviewed the patient's H&P dated: There are no significant changes.     Ready For Surgery From Anesthesia Perspective.     .

## 2025-05-19 ENCOUNTER — ANESTHESIA (OUTPATIENT)
Dept: ENDOSCOPY | Facility: HOSPITAL | Age: 65
End: 2025-05-19
Payer: MEDICARE

## 2025-05-19 ENCOUNTER — HOSPITAL ENCOUNTER (OUTPATIENT)
Facility: HOSPITAL | Age: 65
Discharge: HOME | End: 2025-05-19
Attending: STUDENT IN AN ORGANIZED HEALTH CARE EDUCATION/TRAINING PROGRAM | Admitting: STUDENT IN AN ORGANIZED HEALTH CARE EDUCATION/TRAINING PROGRAM
Payer: MEDICARE

## 2025-05-19 VITALS
HEART RATE: 70 BPM | TEMPERATURE: 98 F | HEIGHT: 61 IN | DIASTOLIC BLOOD PRESSURE: 60 MMHG | BODY MASS INDEX: 33.79 KG/M2 | WEIGHT: 179 LBS | RESPIRATION RATE: 20 BRPM | SYSTOLIC BLOOD PRESSURE: 123 MMHG | OXYGEN SATURATION: 100 %

## 2025-05-19 DIAGNOSIS — Z12.12 SCREENING FOR COLORECTAL CANCER: ICD-10-CM

## 2025-05-19 DIAGNOSIS — Z12.11 SCREENING FOR COLORECTAL CANCER: ICD-10-CM

## 2025-05-19 DIAGNOSIS — Z12.11 COLON CANCER SCREENING: Primary | ICD-10-CM

## 2025-05-19 LAB — POCT GLUCOSE: 161 MG/DL (ref 70–110)

## 2025-05-19 PROCEDURE — 45380 COLONOSCOPY AND BIOPSY: CPT | Mod: PT,,, | Performed by: STUDENT IN AN ORGANIZED HEALTH CARE EDUCATION/TRAINING PROGRAM

## 2025-05-19 PROCEDURE — 37000009 HC ANESTHESIA EA ADD 15 MINS: Performed by: STUDENT IN AN ORGANIZED HEALTH CARE EDUCATION/TRAINING PROGRAM

## 2025-05-19 PROCEDURE — 45380 COLONOSCOPY AND BIOPSY: CPT | Mod: PT | Performed by: STUDENT IN AN ORGANIZED HEALTH CARE EDUCATION/TRAINING PROGRAM

## 2025-05-19 PROCEDURE — 63600175 PHARM REV CODE 636 W HCPCS: Performed by: NURSE ANESTHETIST, CERTIFIED REGISTERED

## 2025-05-19 PROCEDURE — 94761 N-INVAS EAR/PLS OXIMETRY MLT: CPT

## 2025-05-19 PROCEDURE — 27201089 HC SNARE, DISP (ANY): Performed by: STUDENT IN AN ORGANIZED HEALTH CARE EDUCATION/TRAINING PROGRAM

## 2025-05-19 PROCEDURE — 27201012 HC FORCEPS, HOT/COLD, DISP: Performed by: STUDENT IN AN ORGANIZED HEALTH CARE EDUCATION/TRAINING PROGRAM

## 2025-05-19 PROCEDURE — 99900035 HC TECH TIME PER 15 MIN (STAT)

## 2025-05-19 PROCEDURE — 27200997: Performed by: STUDENT IN AN ORGANIZED HEALTH CARE EDUCATION/TRAINING PROGRAM

## 2025-05-19 PROCEDURE — 37000008 HC ANESTHESIA 1ST 15 MINUTES: Performed by: STUDENT IN AN ORGANIZED HEALTH CARE EDUCATION/TRAINING PROGRAM

## 2025-05-19 PROCEDURE — 25000003 PHARM REV CODE 250: Performed by: NURSE ANESTHETIST, CERTIFIED REGISTERED

## 2025-05-19 PROCEDURE — 88305 TISSUE EXAM BY PATHOLOGIST: CPT | Mod: TC | Performed by: STUDENT IN AN ORGANIZED HEALTH CARE EDUCATION/TRAINING PROGRAM

## 2025-05-19 RX ORDER — LIDOCAINE HYDROCHLORIDE 20 MG/ML
INJECTION INTRAVENOUS
Status: DISCONTINUED | OUTPATIENT
Start: 2025-05-19 | End: 2025-05-19

## 2025-05-19 RX ORDER — PROPOFOL 10 MG/ML
VIAL (ML) INTRAVENOUS CONTINUOUS PRN
Status: DISCONTINUED | OUTPATIENT
Start: 2025-05-19 | End: 2025-05-19

## 2025-05-19 RX ORDER — PROPOFOL 10 MG/ML
VIAL (ML) INTRAVENOUS
Status: DISCONTINUED | OUTPATIENT
Start: 2025-05-19 | End: 2025-05-19

## 2025-05-19 RX ORDER — SODIUM CHLORIDE 9 MG/ML
INJECTION, SOLUTION INTRAVENOUS CONTINUOUS
Status: DISCONTINUED | OUTPATIENT
Start: 2025-05-19 | End: 2025-05-19 | Stop reason: HOSPADM

## 2025-05-19 RX ADMIN — PROPOFOL 100 MCG/KG/MIN: 10 INJECTION, EMULSION INTRAVENOUS at 09:05

## 2025-05-19 RX ADMIN — LIDOCAINE HYDROCHLORIDE 100 MG: 20 INJECTION INTRAVENOUS at 09:05

## 2025-05-19 RX ADMIN — SODIUM CHLORIDE: 0.9 INJECTION, SOLUTION INTRAVENOUS at 08:05

## 2025-05-19 RX ADMIN — PROPOFOL 60 MG: 10 INJECTION, EMULSION INTRAVENOUS at 09:05

## 2025-05-19 NOTE — TRANSFER OF CARE
"Anesthesia Transfer of Care Note    Patient: Binta Yang    Procedure(s) Performed: Procedure(s) (LRB):  COLONOSCOPY, SCREENING, LOW RISK PATIENT (N/A)    Patient location: PACU    Anesthesia Type: general    Transport from OR: Transported from OR on room air with adequate spontaneous ventilation    Post pain: adequate analgesia    Post assessment: no apparent anesthetic complications and tolerated procedure well    Post vital signs: stable    Level of consciousness: alert and awake    Nausea/Vomiting: no nausea/vomiting    Complications: none    Transfer of care protocol was followed      Last vitals: Visit Vitals  /63   Pulse 79   Temp 37.2 °C (98.9 °F) (Temporal)   Resp 18   Ht 5' 1" (1.549 m)   Wt 81.2 kg (179 lb)   SpO2 100%   Breastfeeding No   BMI 33.82 kg/m²     "

## 2025-05-19 NOTE — PLAN OF CARE
Pt in preop bay 5, VSS and IV inserted. Pt denies any open wounds on body or the use of any weight loss injections. Pt needs an  updated H&P, procedural consents,  and anesthesia consents, otherwise ready to roll.

## 2025-05-19 NOTE — PROVATION PATIENT INSTRUCTIONS
Discharge Summary/Instructions after an Endoscopic Procedure  Patient Name: Binta Yang  Patient MRN: [id number]  Patient YOB: 1960  Monday, May 19, 2025  Keira Garcia MD  Dear patient,  As a result of recent federal legislation (The Federal Cures Act), you may   receive lab or pathology results from your procedure in your [facility]   account before your physician is able to contact you. Your physician or   their representative will relay the results to you with their   recommendations at their soonest availability.  Thank you,  RESTRICTIONS:  During your procedure today, you received medications for sedation.  These   medications may affect your judgment, balance and coordination.  Therefore,   for 24 hours, you have the following restrictions:   - DO NOT drive a car, operate machinery, make legal/financial decisions,   sign important papers or drink alcohol.    ACTIVITY:  Today: no heavy lifting, straining or running due to procedural   sedation/anesthesia.  The following day: return to full activity including work.  DIET:  Eat and drink normally unless instructed otherwise.     TREATMENT FOR COMMON SIDE EFFECTS:  - Mild abdominal pain, nausea, belching, bloating or excessive gas:  rest,   eat lightly and use a heating pad.  - Sore Throat: treat with throat lozenges and/or gargle with warm salt   water.  - Because air was used during the procedure, expelling large amounts of air   from your rectum or belching is normal.  - If a bowel prep was taken, you may not have a bowel movement for 1-3 days.    This is normal.  SYMPTOMS TO WATCH FOR AND REPORT TO YOUR PHYSICIAN:  1. Abdominal pain or bloating, other than gas cramps.  2. Chest pain.  3. Back pain.  4. Signs of infection such as: chills or fever occurring within 24 hours   after the procedure.  5. Rectal bleeding, which would show as bright red, maroon, or black stools.   (A tablespoon of blood from the rectum is not serious, especially if    hemorrhoids are present.)  6. Vomiting.  7. Weakness or dizziness.  GO DIRECTLY TO THE NEAREST EMERGENCY ROOM IF YOU HAVE ANY OF THE FOLLOWING:      Difficulty breathing              Chills and/or fever over 101 F   Persistent vomiting and/or vomiting blood   Severe abdominal pain   Severe chest pain   Black, tarry stools   Bleeding- more than one tablespoon   Any other symptom or condition that you feel may need urgent attention  Your doctor recommends these additional instructions:  If any biopsies were taken, your doctors clinic will contact you in 1 to 2   weeks with any results.  - Discharge patient to home (ambulatory).   - Resume regular diet today.   - Continue present medications.   - Await pathology results.   - Repeat colonoscopy in 5 years for surveillance.  For questions, problems or results please call your physician - Keira Garcia MD at Work:  [phone].  [facility] [place], EMERGENCY ROOM PHONE NUMBER: [phone]  IF A COMPLICATION OR EMERGENCY SITUATION ARISES AND YOU ARE UNABLE TO REACH   YOUR PHYSICIAN - GO DIRECTLY TO THE EMERGENCY ROOM.  Keira Garcia MD  5/19/2025 9:25:32 AM  This report has been verified and signed electronically.  Dear patient,  As a result of recent federal legislation (The Federal Cures Act), you may   receive lab or pathology results from your procedure in your [facility]   account before your physician is able to contact you. Your physician or   their representative will relay the results to you with their   recommendations at their soonest availability.  Thank you,  PROVATION

## 2025-05-19 NOTE — ANESTHESIA POSTPROCEDURE EVALUATION
Anesthesia Post Evaluation    Patient: Binta Yang    Procedure(s) Performed: Procedure(s) (LRB):  COLONOSCOPY, SCREENING, LOW RISK PATIENT (N/A)    Final Anesthesia Type: general      Patient location during evaluation: GI PACU  Patient participation: Yes- Able to Participate  Level of consciousness: awake and alert, oriented and awake  Post-procedure vital signs: reviewed and stable  Pain management: adequate  Airway patency: patent    PONV status at discharge: No PONV  Anesthetic complications: no      Cardiovascular status: stable  Respiratory status: unassisted and room air  Hydration status: euvolemic  Follow-up not needed.              Vitals Value Taken Time   /77 05/19/25 09:42   Temp 36.7 °C (98 °F) 05/19/25 09:22   Pulse 71 05/19/25 09:42   Resp 28 05/19/25 09:41   SpO2 86 % 05/19/25 09:42   Vitals shown include unfiled device data.      Event Time   Out of Recovery 09:38:00         Pain/Helga Score: Helga Score: 9 (5/19/2025  9:23 AM)

## 2025-05-20 ENCOUNTER — RESULTS FOLLOW-UP (OUTPATIENT)
Dept: GASTROENTEROLOGY | Facility: CLINIC | Age: 65
End: 2025-05-20

## 2025-05-20 LAB
ESTROGEN SERPL-MCNC: NORMAL PG/ML
INSULIN SERPL-ACNC: NORMAL U[IU]/ML
LAB AP CLINICAL INFORMATION: NORMAL
LAB AP GROSS DESCRIPTION: NORMAL
LAB AP PERFORMING LOCATION(S): NORMAL
LAB AP REPORT FOOTNOTES: NORMAL

## 2025-06-03 ENCOUNTER — HOSPITAL ENCOUNTER (OUTPATIENT)
Dept: RADIOLOGY | Facility: OTHER | Age: 65
Discharge: HOME | End: 2025-06-03
Attending: STUDENT IN AN ORGANIZED HEALTH CARE EDUCATION/TRAINING PROGRAM
Payer: MEDICARE

## 2025-06-03 DIAGNOSIS — Z12.31 SCREENING MAMMOGRAM FOR BREAST CANCER: ICD-10-CM

## 2025-06-03 PROCEDURE — 77063 BREAST TOMOSYNTHESIS BI: CPT | Mod: TC

## 2025-06-03 PROCEDURE — 77063 BREAST TOMOSYNTHESIS BI: CPT | Mod: 26,,, | Performed by: RADIOLOGY

## 2025-06-03 PROCEDURE — 77067 SCR MAMMO BI INCL CAD: CPT | Mod: 26,,, | Performed by: RADIOLOGY

## 2025-06-09 ENCOUNTER — PATIENT MESSAGE (OUTPATIENT)
Dept: ADMINISTRATIVE | Facility: HOSPITAL | Age: 65
End: 2025-06-09
Payer: MEDICARE

## 2025-08-07 ENCOUNTER — OFFICE VISIT (OUTPATIENT)
Dept: INTERNAL MEDICINE | Facility: CLINIC | Age: 65
End: 2025-08-07
Payer: MEDICARE

## 2025-08-07 VITALS
OXYGEN SATURATION: 98 % | HEART RATE: 85 BPM | WEIGHT: 171.94 LBS | BODY MASS INDEX: 32.46 KG/M2 | SYSTOLIC BLOOD PRESSURE: 134 MMHG | HEIGHT: 61 IN | DIASTOLIC BLOOD PRESSURE: 80 MMHG

## 2025-08-07 DIAGNOSIS — E53.8 VITAMIN B12 DEFICIENCY: ICD-10-CM

## 2025-08-07 DIAGNOSIS — Z00.00 HEALTH MAINTENANCE EXAMINATION: Primary | ICD-10-CM

## 2025-08-07 DIAGNOSIS — E11.9 TYPE 2 DIABETES MELLITUS WITHOUT COMPLICATION, WITHOUT LONG-TERM CURRENT USE OF INSULIN: ICD-10-CM

## 2025-08-07 DIAGNOSIS — D64.9 ANEMIA, UNSPECIFIED TYPE: ICD-10-CM

## 2025-08-07 DIAGNOSIS — I10 ESSENTIAL HYPERTENSION: ICD-10-CM

## 2025-08-07 DIAGNOSIS — N32.81 OAB (OVERACTIVE BLADDER): ICD-10-CM

## 2025-08-07 DIAGNOSIS — E78.2 MIXED HYPERLIPIDEMIA: ICD-10-CM

## 2025-08-07 DIAGNOSIS — R35.1 NOCTURIA: ICD-10-CM

## 2025-08-07 DIAGNOSIS — D64.9 NORMOCYTIC ANEMIA: ICD-10-CM

## 2025-08-07 DIAGNOSIS — E55.9 VITAMIN D DEFICIENCY: ICD-10-CM

## 2025-08-07 PROCEDURE — 99999 PR PBB SHADOW E&M-EST. PATIENT-LVL IV: CPT | Mod: PBBFAC,,, | Performed by: STUDENT IN AN ORGANIZED HEALTH CARE EDUCATION/TRAINING PROGRAM

## 2025-08-07 PROCEDURE — 99214 OFFICE O/P EST MOD 30 MIN: CPT | Mod: PBBFAC | Performed by: STUDENT IN AN ORGANIZED HEALTH CARE EDUCATION/TRAINING PROGRAM

## 2025-08-07 RX ORDER — OXYBUTYNIN CHLORIDE 5 MG/1
5 TABLET, EXTENDED RELEASE ORAL DAILY
Qty: 90 TABLET | Refills: 0 | Status: SHIPPED | OUTPATIENT
Start: 2025-08-07

## 2025-08-07 RX ORDER — LANOLIN ALCOHOL/MO/W.PET/CERES
1000 CREAM (GRAM) TOPICAL DAILY
Qty: 90 TABLET | Refills: 3 | Status: SHIPPED | OUTPATIENT
Start: 2025-08-07

## 2025-08-07 RX ORDER — ERGOCALCIFEROL 1.25 MG/1
50000 CAPSULE ORAL
Qty: 24 CAPSULE | Refills: 3 | Status: SHIPPED | OUTPATIENT
Start: 2025-08-08

## 2025-08-07 NOTE — PROGRESS NOTES
Subjective:       Patient ID: Binta Yang is a 64 y.o. female.    Chief Complaint: Health maintenance examination [Z00.00]    Patient is established with me, here today for the following:    T2DM, HTN, HLD, intellectual disability, seizures, depression, vitamin D deficiency, vitamin B12 deficiency    She is with family who provides supplemental history    History of Present Illness      DIABETES:  Her A1C has increased from 7.5 to 8.0 on most recent labs. She reports increased nighttime food consumption, particularly carbohydrates and sweet foods, which may be contributing to elevated blood sugar. She continues to take Jardiance regularly as prescribed and appears aware of the need to improve dietary habits and blood sugar control.    URINARY FREQUENCY:  She reports ongoing frequent urination. She was previously prescribed Vesicare in March after urogynecology consultation, but the medication has not been refilled since. She missed a scheduled urogynecology appointment in May and has previously tried oxybutynin for overactive bladder symptoms. She denies other significant urinary concerns such as pain or burning with urination. Her caregiver notes the urinary frequency is impacting daily activities, requiring frequent bathroom trips.    ANEMIA:  She reports no symptoms related to anemia. Current lab results indicate mild anemia. She denies fatigue, breathing difficulties, or cardiac symptoms associated with anemia.    MEDICATIONS:  She is currently out of Vitamin D supplements.      ROS:  Genitourinary: +frequency  Female Genitourinary: +urinary incontinence        Health maintenance -   Colonoscopy performed MAY2025. Told to repeat in 5 years.   Denies family history of colorectal cancer.  Mammogram BI-RADS 1 in JUNE2025.  Denies history of abnormal mammogram.  Denies family history of breast cancer.  Denies family history of ovarian cancer.  Hysterectomy performed after childbirth.  Family history of  "cardiac disease.  UTD on Tdap, COVID, shingles, PCV20, influenza vaccinations.  Due for RSV vaccinations.  Never smoker.  Denies current alcohol use.   Denies drug use.  Completed HIV and hepatitis C screening.          T2DM -   Currently taking Jardiance   UTD on foot exam.  UTD on eye exam.  Lab Results   Component Value Date    HGBA1C 8.0 (H) 08/01/2025    HGBA1C 7.5 (H) 11/15/2024    HGBA1C 6.8 (H) 05/06/2024     Lab Results   Component Value Date    MICALBCREAT 140.0 (H) 08/01/2025        HLD -   Endorses taking atorvastatin as directed  Lab Results   Component Value Date    CHOL 157 11/15/2024     Lab Results   Component Value Date    TRIG 73 11/15/2024     Lab Results   Component Value Date    LDLCALC 68.4 11/15/2024     Lab Results   Component Value Date    HDL 74 11/15/2024     HTN -   Currently prescribed amlodipine, valsartan.  Lab Results   Component Value Date    MICALBCREAT 140.0 (H) 08/01/2025     BP Readings from Last 5 Encounters:   08/07/25 134/80   05/19/25 123/60   05/07/25 (!) 147/75   04/01/25 (!) 147/75   03/13/25 (!) 143/70     Following with Dr. Pearce routinely for psychiatry.  Taking fluoxetine and Abilify       Anemia -  Vitamin B12 deficiency -   Lab Results   Component Value Date    HGB 11.7 (L) 08/01/2025    HCT 37.3 08/01/2025    MCV 83 08/01/2025    RDW 13.8 08/01/2025     Lab Results   Component Value Date    IRON 73 11/15/2024    TRANSFERRIN 203 11/15/2024    TIBC 300 11/15/2024    FESATURATED 24 11/15/2024      Lab Results   Component Value Date    FERRITIN 71 11/15/2024     Lab Results   Component Value Date    [id number] 873 08/01/2025     Lab Results   Component Value Date    FOLATE 11.6 11/15/2024     Osteopenia -   Vitamin D deficiency -   Completed DEXA in MAY2024.  Showed osteopenia of right and left hips.  "Ten year fracture probability:   Major osteoporotic 4.4%.   Hip 0.6%. "  Denies family history of osteoporosis.  Lab Results   Component Value Date    [id number] 7 " "(L) 11/15/2024    [id number] 11 (L) 11/06/2023          Current Outpatient Medications   Medication Instructions    amLODIPine (NORVASC) 10 mg, Oral, Daily    ARIPiprazole (ABILIFY) 2 mg, Oral, Daily    atorvastatin (LIPITOR) 40 mg, Oral, Daily    blood sugar diagnostic Strp To check BG up to 6 times daily, to use with insurance preferred meter    blood-glucose meter kit To check BG up to 6 times daily, to use with insurance preferred meter    cyanocobalamin (VITAMIN B-12) 1,000 mcg, Oral, Daily    empagliflozin (JARDIANCE) 25 mg, Oral, Daily    [START ON 8/8/2025] ergocalciferol (ERGOCALCIFEROL) 50,000 Units, Oral, Every Mon, Fri    estradioL (ESTRING) 2 mg, Vaginal, Every 3 months    fexofenadine (ALLEGRA) 180 mg, Oral, Daily    flash glucose scanning reader (FREESTYLE ALEJANDRO 14 DAY READER) Misc Check blood glucose before meals and nightly.    flash glucose sensor (FREESTYLE ALEJANDRO 14 DAY SENSOR) Kit Apply to skin for 14 days.  Check blood glucose before meals and nightly.    FLUoxetine 10 mg, Oral, Daily, Take with Prozac 20 mg to equal 30 mg daily    FLUoxetine 20 mg, Oral, Daily    lancets Misc To check BG up to 6 times daily, to use with insurance preferred meter    latanoprost 0.005 % ophthalmic solution 1 drop, Both Eyes, Nightly    levetiracetam XR (KEPPRA XR) 750 mg, Oral, Nightly    oxybutynin (DITROPAN-XL) 5 mg, Oral, Daily    semaglutide (RYBELSUS) 3 mg, Oral, Daily    valsartan (DIOVAN) 80 mg, Oral, Daily    zinc oxide 10 % Oint 1 Application, Topical (Top), 2 times daily     Objective:      Vitals:    08/07/25 1035   BP: 134/80   Pulse: 85   SpO2: 98%   Weight: 78 kg (171 lb 15.3 oz)   Height: 5' 1" (1.549 m)   PainSc: 0-No pain     Body mass index is 32.49 kg/m².    Physical Exam  Vitals reviewed.   Constitutional:       General: She is not in acute distress.     Appearance: She is not ill-appearing or diaphoretic.   Cardiovascular:      Rate and Rhythm: Normal rate and regular rhythm.      Heart " sounds: Normal heart sounds. No murmur heard.     No friction rub. No gallop.   Pulmonary:      Effort: Pulmonary effort is normal. No respiratory distress.      Breath sounds: Normal breath sounds. No stridor. No wheezing, rhonchi or rales.   Skin:     General: Skin is warm and dry.      Comments: Color WNL   Neurological:      Mental Status: She is alert. Mental status is at baseline.   Psychiatric:         Speech: Speech is delayed.         Assessment:       1. Health maintenance examination    2. Nocturia    3. OAB (overactive bladder)    4. Vitamin D deficiency    5. Vitamin B12 deficiency    6. Type 2 diabetes mellitus without complication, without long-term current use of insulin    7. Anemia, unspecified type    8. Normocytic anemia    9. Essential hypertension    10. Mixed hyperlipidemia        Plan:   Health maintenance examination  -     CBC Auto Differential; Future; Expected date: 11/07/2025  -     Hemoglobin A1C; Future; Expected date: 11/07/2025  -     Comprehensive Metabolic Panel; Future; Expected date: 11/07/2025  -     Ferritin; Future; Expected date: 11/07/2025  -     Iron and TIBC; Future; Expected date: 11/07/2025  -     Vitamin D; Future; Expected date: 11/07/2025  -     Lipid Panel; Future; Expected date: 11/07/2025  -     TSH; Future; Expected date: 11/07/2025    Nocturia  -     zinc oxide 10 % Oint; Apply 1 Application topically 2 (two) times a day.  Dispense: 85 g; Refill: 5    OAB (overactive bladder)  -     Ambulatory referral/consult to Urogynecology; Future; Expected date: 08/14/2025  -     Urinalysis, Reflex to Urine Culture Urine, Clean Catch; Future; Expected date: 08/07/2025  -     oxybutynin (DITROPAN-XL) 5 MG TR24; Take 1 tablet (5 mg total) by mouth once daily.  Dispense: 90 tablet; Refill: 0    Vitamin D deficiency  -     ergocalciferol (ERGOCALCIFEROL) 50,000 unit Cap; Take 1 capsule (50,000 Units total) by mouth every Monday and Friday.  Dispense: 24 capsule; Refill: 3  -      Vitamin D; Future; Expected date: 11/07/2025    Vitamin B12 deficiency  -     cyanocobalamin (VITAMIN B-12) 1000 MCG tablet; Take 1 tablet (1,000 mcg total) by mouth once daily.  Dispense: 90 tablet; Refill: 3  -     Vitamin B12; Future; Expected date: 11/07/2025    Type 2 diabetes mellitus without complication, without long-term current use of insulin  -     semaglutide (RYBELSUS) 3 mg tablet; Take 1 tablet (3 mg total) by mouth once daily.  Dispense: 30 tablet; Refill: 0  -     Hemoglobin A1C; Future; Expected date: 11/07/2025    Anemia, unspecified type  -     CBC Auto Differential; Future; Expected date: 11/07/2025  -     Haptoglobin; Future; Expected date: 11/07/2025  -     Lactate Dehydrogenase; Future; Expected date: 11/07/2025  -     Immunoglobulin Free LT Chains Blood; Future; Expected date: 11/07/2025  -     Immunoglobulins (IgG, IgA, IgM) Quantitative; Future; Expected date: 11/07/2025    Normocytic anemia  -     CBC Auto Differential; Future; Expected date: 11/07/2025  -     Ferritin; Future; Expected date: 11/07/2025  -     Iron and TIBC; Future; Expected date: 11/07/2025    Essential hypertension  -     Comprehensive Metabolic Panel; Future; Expected date: 11/07/2025  -     TSH; Future; Expected date: 11/07/2025    Mixed hyperlipidemia  -     Lipid Panel; Future; Expected date: 11/07/2025        Assessment & Plan      PLAN SUMMARY:  - Refilled zinc oxide cream for skin protection  - Initiated GLP-1 agonist (Rybelsus) for glucose management  - Continued Valsartan for blood pressure control  - Ordered urinalysis to rule out urinary tract infection  - Restarted oxybutynin for overactive bladder symptoms  - Ordered repeat labs including A1C and anemia workup in 3 months  - Continued vitamin D supplementation  - Referred patient back to urogynecology for follow-up  - Follow-up in November to reassess medication efficacy and review lab results  - Herdine to contact office after first month for potential  Rybelsus dose increase    ## TYPE 2 DIABETES MELLITUS:  - A1C has increased from 7.5 to 8, indicating need for improved glycemic control.  - Initiated GLP-1 agonist (Rybelsus) to current regimen of Jardiance for better glucose management and potential appetite suppression, starting with the lowest dose for the first month.  - Explained potential side effects including decreased appetite, nausea if eating too much or too quickly, and possible changes in bowel movements.  - Instructed patient to contact the office after finishing the first month for potential dose increase and to take a stool softener if constipation occurs.  - Discussed the relationship between uncontrolled diabetes and anemia, and explained the protective effects of glycemic control on renal function and cardiovascular health.  - Ordered repeat labs including A1C in 3 months.    ## OVERACTIVE BLADDER:  - Binta experiences frequent urination and wetness, possibly due to overactive bladder.  - Ordered urinalysis to rule out urinary tract infection as a cause.  - Restarted oxybutynin to help decrease urine frequency and overactive bladder symptoms, as VESIcare was previously prescribed but was expensive.  - Referred patient back to urogynecology for follow-up on symptoms.    ## ANEMIA:  - Current anemia not causing issues with fatigue, dyspnea, or cardiac problems.  - Discussed relationship between uncontrolled diabetes and anemia, anticipating improvement as glycemic control improves.  - Ordered repeat labs including anemia workup in 3 months when rechecking A1C.  - Will consider consultation with a hematologist if anemia does not improve after blood sugar control.    ## HYPERTENSION:  - Blood pressure is currently well-controlled with Valsartan and amlodipine therapy.  - Explained the protective effects of blood pressure control on renal function and cardiovascular health.  - Continued Valsartan and amlodipine for blood pressure management.    ##  DIAPER DERMATITIS:  - Adilsonine experiences rash due to frequent urination.  - Zinc oxide cream is effective in treating affected areas.  - Refilled zinc oxide cream for continued skin protection against urine-related dermatitis.    ## FOLLOW-UP:  - Scheduled follow up in November to reassess medication efficacy and review lab results.  - Continued vitamin D supplementation.         Rose Li MD      8/7/2025

## 2025-08-08 ENCOUNTER — LAB VISIT (OUTPATIENT)
Dept: LAB | Facility: OTHER | Age: 65
End: 2025-08-08
Attending: STUDENT IN AN ORGANIZED HEALTH CARE EDUCATION/TRAINING PROGRAM
Payer: MEDICARE

## 2025-08-08 DIAGNOSIS — I10 ESSENTIAL HYPERTENSION: ICD-10-CM

## 2025-08-08 DIAGNOSIS — Z00.00 HEALTH MAINTENANCE EXAMINATION: ICD-10-CM

## 2025-08-08 LAB
ALBUMIN/CREAT UR: 134 UG/MG
CREAT UR-MCNC: 53 MG/DL (ref 15–325)
MICROALBUMIN UR-MCNC: 71 UG/ML (ref ?–5000)

## 2025-08-08 PROCEDURE — 82570 ASSAY OF URINE CREATININE: CPT

## 2025-08-11 ENCOUNTER — OFFICE VISIT (OUTPATIENT)
Dept: UROGYNECOLOGY | Facility: CLINIC | Age: 65
End: 2025-08-11
Payer: MEDICARE

## 2025-08-11 VITALS
WEIGHT: 173.75 LBS | SYSTOLIC BLOOD PRESSURE: 146 MMHG | DIASTOLIC BLOOD PRESSURE: 65 MMHG | BODY MASS INDEX: 32.8 KG/M2 | HEART RATE: 93 BPM | HEIGHT: 61 IN

## 2025-08-11 DIAGNOSIS — N39.8 VOIDING DYSFUNCTION: ICD-10-CM

## 2025-08-11 DIAGNOSIS — R35.1 NOCTURIA: Primary | ICD-10-CM

## 2025-08-11 DIAGNOSIS — N32.81 OAB (OVERACTIVE BLADDER): ICD-10-CM

## 2025-08-11 PROCEDURE — 99215 OFFICE O/P EST HI 40 MIN: CPT | Mod: S$PBB,,,

## 2025-08-11 PROCEDURE — 99999 PR PBB SHADOW E&M-EST. PATIENT-LVL IV: CPT | Mod: PBBFAC,,,

## 2025-08-11 PROCEDURE — 99214 OFFICE O/P EST MOD 30 MIN: CPT | Mod: PBBFAC

## 2025-08-11 RX ORDER — VIBEGRON 75 MG/1
75 TABLET, FILM COATED ORAL DAILY
Qty: 30 TABLET | Refills: 2 | Status: SHIPPED | OUTPATIENT
Start: 2025-08-11 | End: 2025-11-09